# Patient Record
Sex: FEMALE | Race: WHITE | NOT HISPANIC OR LATINO | Employment: OTHER | URBAN - NONMETROPOLITAN AREA
[De-identification: names, ages, dates, MRNs, and addresses within clinical notes are randomized per-mention and may not be internally consistent; named-entity substitution may affect disease eponyms.]

---

## 2018-10-16 ENCOUNTER — APPOINTMENT (OUTPATIENT)
Dept: CT IMAGING | Facility: HOSPITAL | Age: 38
End: 2018-10-16

## 2018-10-16 ENCOUNTER — HOSPITAL ENCOUNTER (EMERGENCY)
Facility: HOSPITAL | Age: 38
Discharge: HOME | End: 2018-10-17
Attending: EMERGENCY MEDICINE | Admitting: NURSE PRACTITIONER

## 2018-10-16 DIAGNOSIS — L98.9 SKIN LESIONS: Primary | ICD-10-CM

## 2018-10-16 LAB
6-ACETYL MORPHINE: NEGATIVE
ALBUMIN SERPL-MCNC: 3.7 G/DL (ref 3.5–5)
ALBUMIN/GLOB SERPL: 0.7 G/DL (ref 1.5–2.5)
ALP SERPL-CCNC: 134 U/L (ref 35–104)
ALT SERPL W P-5'-P-CCNC: 21 U/L (ref 10–36)
AMPHET+METHAMPHET UR QL: NEGATIVE
AMYLASE SERPL-CCNC: 31 U/L (ref 28–100)
ANION GAP SERPL CALCULATED.3IONS-SCNC: 10.1 MMOL/L (ref 3.6–11.2)
AST SERPL-CCNC: 18 U/L (ref 10–30)
BACTERIA UR QL AUTO: ABNORMAL /HPF
BARBITURATES UR QL SCN: NEGATIVE
BASOPHILS # BLD AUTO: 0.06 10*3/MM3 (ref 0–0.3)
BASOPHILS NFR BLD AUTO: 0.6 % (ref 0–2)
BENZODIAZ UR QL SCN: NEGATIVE
BILIRUB SERPL-MCNC: 0.4 MG/DL (ref 0.2–1.8)
BILIRUB UR QL STRIP: NEGATIVE
BUN BLD-MCNC: 13 MG/DL (ref 7–21)
BUN/CREAT SERPL: 13.4 (ref 7–25)
BUPRENORPHINE SERPL-MCNC: NEGATIVE NG/ML
CALCIUM SPEC-SCNC: 9.5 MG/DL (ref 7.7–10)
CANNABINOIDS SERPL QL: NEGATIVE
CHLORIDE SERPL-SCNC: 88 MMOL/L (ref 99–112)
CLARITY UR: CLEAR
CO2 SERPL-SCNC: 26.9 MMOL/L (ref 24.3–31.9)
COCAINE UR QL: NEGATIVE
COLOR UR: YELLOW
CREAT BLD-MCNC: 0.97 MG/DL (ref 0.43–1.29)
CRP SERPL-MCNC: 3.65 MG/DL (ref 0–0.99)
D-LACTATE SERPL-SCNC: 1.8 MMOL/L (ref 0.5–2)
DEPRECATED RDW RBC AUTO: 36 FL (ref 37–54)
EOSINOPHIL # BLD AUTO: 0.23 10*3/MM3 (ref 0–0.7)
EOSINOPHIL NFR BLD AUTO: 2.1 % (ref 0–5)
ERYTHROCYTE [DISTWIDTH] IN BLOOD BY AUTOMATED COUNT: 12.8 % (ref 11.5–14.5)
ERYTHROCYTE [SEDIMENTATION RATE] IN BLOOD: >100 MM/HR (ref 0–20)
ETHANOL BLD-MCNC: <10 MG/DL
ETHANOL UR QL: <0.01 %
GFR SERPL CREATININE-BSD FRML MDRD: 64 ML/MIN/1.73
GLOBULIN UR ELPH-MCNC: 5 GM/DL
GLUCOSE BLD-MCNC: 540 MG/DL (ref 70–110)
GLUCOSE BLDC GLUCOMTR-MCNC: 375 MG/DL (ref 70–130)
GLUCOSE UR STRIP-MCNC: ABNORMAL MG/DL
HCG SERPL QL: NEGATIVE
HCT VFR BLD AUTO: 33.8 % (ref 37–47)
HGB BLD-MCNC: 11.8 G/DL (ref 12–16)
HGB UR QL STRIP.AUTO: ABNORMAL
HYALINE CASTS UR QL AUTO: ABNORMAL /LPF
IMM GRANULOCYTES # BLD: 0.04 10*3/MM3 (ref 0–0.03)
IMM GRANULOCYTES NFR BLD: 0.4 % (ref 0–0.5)
KETONES UR QL STRIP: NEGATIVE
LEUKOCYTE ESTERASE UR QL STRIP.AUTO: ABNORMAL
LIPASE SERPL-CCNC: 80 U/L (ref 13–60)
LYMPHOCYTES # BLD AUTO: 3.05 10*3/MM3 (ref 1–3)
LYMPHOCYTES NFR BLD AUTO: 28.1 % (ref 21–51)
MCH RBC QN AUTO: 27.6 PG (ref 27–33)
MCHC RBC AUTO-ENTMCNC: 34.9 G/DL (ref 33–37)
MCV RBC AUTO: 79 FL (ref 80–94)
METHADONE UR QL SCN: NEGATIVE
MONOCYTES # BLD AUTO: 0.78 10*3/MM3 (ref 0.1–0.9)
MONOCYTES NFR BLD AUTO: 7.2 % (ref 0–10)
NEUTROPHILS # BLD AUTO: 6.71 10*3/MM3 (ref 1.4–6.5)
NEUTROPHILS NFR BLD AUTO: 61.6 % (ref 30–70)
NITRITE UR QL STRIP: NEGATIVE
OPIATES UR QL: NEGATIVE
OSMOLALITY SERPL CALC.SUM OF ELEC: 276.1 MOSM/KG (ref 273–305)
OXYCODONE UR QL SCN: NEGATIVE
PCP UR QL SCN: NEGATIVE
PH UR STRIP.AUTO: 7 [PH] (ref 5–8)
PLATELET # BLD AUTO: 335 10*3/MM3 (ref 130–400)
PMV BLD AUTO: 9.8 FL (ref 6–10)
POTASSIUM BLD-SCNC: 3.6 MMOL/L (ref 3.5–5.3)
PROT SERPL-MCNC: 8.7 G/DL (ref 6–8)
PROT UR QL STRIP: ABNORMAL
RBC # BLD AUTO: 4.28 10*6/MM3 (ref 4.2–5.4)
RBC # UR: ABNORMAL /HPF
REF LAB TEST METHOD: ABNORMAL
SODIUM BLD-SCNC: 125 MMOL/L (ref 135–153)
SP GR UR STRIP: >=1.03 (ref 1–1.03)
SQUAMOUS #/AREA URNS HPF: ABNORMAL /HPF
UROBILINOGEN UR QL STRIP: ABNORMAL
WBC NRBC COR # BLD: 10.87 10*3/MM3 (ref 4.5–12.5)
WBC UR QL AUTO: ABNORMAL /HPF

## 2018-10-16 PROCEDURE — 63710000001 INSULIN REGULAR HUMAN PER 5 UNITS: Performed by: NURSE PRACTITIONER

## 2018-10-16 PROCEDURE — 80307 DRUG TEST PRSMV CHEM ANLYZR: CPT | Performed by: NURSE PRACTITIONER

## 2018-10-16 PROCEDURE — 85025 COMPLETE CBC W/AUTO DIFF WBC: CPT | Performed by: NURSE PRACTITIONER

## 2018-10-16 PROCEDURE — 74177 CT ABD & PELVIS W/CONTRAST: CPT

## 2018-10-16 PROCEDURE — 74177 CT ABD & PELVIS W/CONTRAST: CPT | Performed by: RADIOLOGY

## 2018-10-16 PROCEDURE — 81001 URINALYSIS AUTO W/SCOPE: CPT | Performed by: NURSE PRACTITIONER

## 2018-10-16 PROCEDURE — 86140 C-REACTIVE PROTEIN: CPT | Performed by: NURSE PRACTITIONER

## 2018-10-16 PROCEDURE — 25010000002 IOPAMIDOL 61 % SOLUTION: Performed by: EMERGENCY MEDICINE

## 2018-10-16 PROCEDURE — 85652 RBC SED RATE AUTOMATED: CPT | Performed by: NURSE PRACTITIONER

## 2018-10-16 PROCEDURE — 84703 CHORIONIC GONADOTROPIN ASSAY: CPT | Performed by: NURSE PRACTITIONER

## 2018-10-16 PROCEDURE — 83690 ASSAY OF LIPASE: CPT | Performed by: NURSE PRACTITIONER

## 2018-10-16 PROCEDURE — 96361 HYDRATE IV INFUSION ADD-ON: CPT

## 2018-10-16 PROCEDURE — 80053 COMPREHEN METABOLIC PANEL: CPT | Performed by: NURSE PRACTITIONER

## 2018-10-16 PROCEDURE — 82962 GLUCOSE BLOOD TEST: CPT

## 2018-10-16 PROCEDURE — 87040 BLOOD CULTURE FOR BACTERIA: CPT | Performed by: NURSE PRACTITIONER

## 2018-10-16 PROCEDURE — 82150 ASSAY OF AMYLASE: CPT | Performed by: NURSE PRACTITIONER

## 2018-10-16 PROCEDURE — 99284 EMERGENCY DEPT VISIT MOD MDM: CPT

## 2018-10-16 PROCEDURE — 83605 ASSAY OF LACTIC ACID: CPT | Performed by: NURSE PRACTITIONER

## 2018-10-16 PROCEDURE — 36415 COLL VENOUS BLD VENIPUNCTURE: CPT

## 2018-10-16 RX ORDER — TRAMADOL HYDROCHLORIDE 50 MG/1
50 TABLET ORAL ONCE
Status: COMPLETED | OUTPATIENT
Start: 2018-10-16 | End: 2018-10-16

## 2018-10-16 RX ORDER — SODIUM CHLORIDE 0.9 % (FLUSH) 0.9 %
10 SYRINGE (ML) INJECTION AS NEEDED
Status: DISCONTINUED | OUTPATIENT
Start: 2018-10-16 | End: 2018-10-17 | Stop reason: HOSPADM

## 2018-10-16 RX ADMIN — HUMAN INSULIN 10 UNITS: 100 INJECTION, SOLUTION SUBCUTANEOUS at 21:59

## 2018-10-16 RX ADMIN — IOPAMIDOL 100 ML: 612 INJECTION, SOLUTION INTRAVENOUS at 21:48

## 2018-10-16 RX ADMIN — SODIUM CHLORIDE 1000 ML: 9 INJECTION, SOLUTION INTRAVENOUS at 21:59

## 2018-10-16 RX ADMIN — TRAMADOL HYDROCHLORIDE 50 MG: 50 TABLET, COATED ORAL at 22:51

## 2018-10-17 VITALS
RESPIRATION RATE: 20 BRPM | DIASTOLIC BLOOD PRESSURE: 103 MMHG | SYSTOLIC BLOOD PRESSURE: 143 MMHG | HEIGHT: 67 IN | TEMPERATURE: 98.9 F | BODY MASS INDEX: 20.4 KG/M2 | HEART RATE: 103 BPM | WEIGHT: 130 LBS | OXYGEN SATURATION: 99 %

## 2018-10-17 PROCEDURE — 25010000002 ONDANSETRON PER 1 MG: Performed by: NURSE PRACTITIONER

## 2018-10-17 PROCEDURE — 96365 THER/PROPH/DIAG IV INF INIT: CPT

## 2018-10-17 PROCEDURE — 25010000002 MORPHINE PER 10 MG: Performed by: NURSE PRACTITIONER

## 2018-10-17 PROCEDURE — 96375 TX/PRO/DX INJ NEW DRUG ADDON: CPT

## 2018-10-17 PROCEDURE — 25010000002 CEFTRIAXONE: Performed by: NURSE PRACTITIONER

## 2018-10-17 RX ORDER — TRAMADOL HYDROCHLORIDE 50 MG/1
50 TABLET ORAL EVERY 4 HOURS PRN
Qty: 12 TABLET | Refills: 0 | Status: ON HOLD | OUTPATIENT
Start: 2018-10-17 | End: 2020-05-25

## 2018-10-17 RX ORDER — SULFAMETHOXAZOLE AND TRIMETHOPRIM 800; 160 MG/1; MG/1
2 TABLET ORAL 2 TIMES DAILY
Qty: 40 TABLET | Refills: 0 | Status: SHIPPED | OUTPATIENT
Start: 2018-10-17 | End: 2018-10-27

## 2018-10-17 RX ORDER — CEPHALEXIN 500 MG/1
500 CAPSULE ORAL 4 TIMES DAILY
Qty: 40 CAPSULE | Refills: 0 | Status: SHIPPED | OUTPATIENT
Start: 2018-10-17 | End: 2018-10-27

## 2018-10-17 RX ORDER — ONDANSETRON 2 MG/ML
4 INJECTION INTRAMUSCULAR; INTRAVENOUS ONCE
Status: COMPLETED | OUTPATIENT
Start: 2018-10-17 | End: 2018-10-17

## 2018-10-17 RX ORDER — MORPHINE SULFATE 2 MG/ML
2 INJECTION, SOLUTION INTRAMUSCULAR; INTRAVENOUS ONCE
Status: COMPLETED | OUTPATIENT
Start: 2018-10-17 | End: 2018-10-17

## 2018-10-17 RX ADMIN — CEFTRIAXONE 1 G: 1 INJECTION, POWDER, FOR SOLUTION INTRAMUSCULAR; INTRAVENOUS at 00:35

## 2018-10-17 RX ADMIN — MORPHINE SULFATE 2 MG: 2 INJECTION, SOLUTION INTRAMUSCULAR; INTRAVENOUS at 00:34

## 2018-10-17 RX ADMIN — ONDANSETRON 4 MG: 2 INJECTION, SOLUTION INTRAMUSCULAR; INTRAVENOUS at 00:32

## 2018-10-17 NOTE — ED NOTES
Consent for medical records sent to [facility]       Jossy Agosto, RN  10/16/18 4025

## 2018-10-17 NOTE — ED NOTES
Pt requesting food and pain medicine.  Provider aware.  Advised nothing to eat at this time r/t elevated glucose       Jossy Agosto, RN  10/16/18 9561

## 2018-10-18 NOTE — ED PROVIDER NOTES
Subjective     History provided by:  Patient  Abdominal Pain   Pain location:  Generalized  Pain quality: sharp and shooting    Pain radiates to:  Does not radiate  Pain severity:  Moderate  Onset quality:  Gradual  Timing:  Constant  Progression:  Worsening  Chronicity:  New  Relieved by:  None tried  Worsened by:  Nothing  Ineffective treatments:  None tried  Associated symptoms: no chest pain, no dysuria and no fever    Rash   Location:  Full body  Quality comment:  Multiple areas of open wounds.   Severity:  Moderate  Onset quality:  Gradual  Progression:  Worsening  Chronicity:  Recurrent  Relieved by:  None tried  Worsened by:  Nothing  Ineffective treatments:  None tried  Associated symptoms: abdominal pain    Associated symptoms: no fever        Review of Systems   Constitutional: Negative.  Negative for fever.   HENT: Negative.    Respiratory: Negative.    Cardiovascular: Negative.  Negative for chest pain.   Gastrointestinal: Positive for abdominal pain.   Endocrine: Negative.    Genitourinary: Negative.  Negative for dysuria.   Skin: Positive for rash.   Neurological: Negative.    Psychiatric/Behavioral: Negative.    All other systems reviewed and are negative.      Past Medical History:   Diagnosis Date   • Anxiety    • CAD (coronary artery disease)    • CHF (congestive heart failure) (CMS/HCC)    • Chronic pain    • Cirrhosis (CMS/HCC)    • Depression    • Diabetes mellitus (CMS/HCC)    • Hepatitis B    • Hepatitis C    • Hypertension        Allergies   Allergen Reactions   • Aspirin Hives   • Ibuprofen Hives       Past Surgical History:   Procedure Laterality Date   • ABOVE KNEE AMPUTATION     • CHOLECYSTECTOMY     • HERNIA REPAIR     • HYSTERECTOMY         History reviewed. No pertinent family history.    Social History     Social History   • Marital status:      Social History Main Topics   • Smoking status: Never Smoker   • Smokeless tobacco: Never Used   • Alcohol use No   • Drug use: Yes       Comment: former user < 1 yr   • Sexual activity: Defer     Other Topics Concern   • Not on file           Objective   Physical Exam   Constitutional: She is oriented to person, place, and time. She appears well-developed and well-nourished. No distress.   HENT:   Head: Normocephalic and atraumatic.   Right Ear: External ear normal.   Left Ear: External ear normal.   Nose: Nose normal.   Eyes: Pupils are equal, round, and reactive to light. Conjunctivae and EOM are normal.   Neck: Normal range of motion. Neck supple. No JVD present. No tracheal deviation present.   Cardiovascular: Normal rate, regular rhythm and normal heart sounds.    No murmur heard.  Pulmonary/Chest: Effort normal and breath sounds normal. No respiratory distress. She has no wheezes.   Abdominal: Soft. Bowel sounds are normal. There is tenderness.   Musculoskeletal: Normal range of motion. She exhibits no edema or deformity.   Neurological: She is alert and oriented to person, place, and time. No cranial nerve deficit.   Skin: Skin is warm and dry. No rash noted. She is not diaphoretic. No erythema. No pallor.   Multiple areas of open wounds scattered throughout her body.    Psychiatric: She has a normal mood and affect. Her behavior is normal. Thought content normal.   Nursing note and vitals reviewed.      Procedures           ED Course                  MDM  Number of Diagnoses or Management Options  Skin lesions: established and worsening     Amount and/or Complexity of Data Reviewed  Clinical lab tests: reviewed  Tests in the radiology section of CPT®: reviewed    Risk of Complications, Morbidity, and/or Mortality  Presenting problems: low  Diagnostic procedures: low  Management options: low    Patient Progress  Patient progress: stable        Final diagnoses:   Skin lesions            Gail Boone, APRN  10/18/18 0046

## 2018-10-21 LAB
BACTERIA SPEC AEROBE CULT: NORMAL
BACTERIA SPEC AEROBE CULT: NORMAL

## 2020-05-25 ENCOUNTER — HOSPITAL ENCOUNTER (INPATIENT)
Facility: HOSPITAL | Age: 40
LOS: 21 days | Discharge: HOME | End: 2020-06-15
Attending: FAMILY MEDICINE | Admitting: INTERNAL MEDICINE

## 2020-05-25 ENCOUNTER — APPOINTMENT (OUTPATIENT)
Dept: NUCLEAR MEDICINE | Facility: HOSPITAL | Age: 40
End: 2020-05-25

## 2020-05-25 ENCOUNTER — APPOINTMENT (OUTPATIENT)
Dept: CT IMAGING | Facility: HOSPITAL | Age: 40
End: 2020-05-25

## 2020-05-25 ENCOUNTER — APPOINTMENT (OUTPATIENT)
Dept: GENERAL RADIOLOGY | Facility: HOSPITAL | Age: 40
End: 2020-05-25

## 2020-05-25 DIAGNOSIS — Z79.4 TYPE 2 DIABETES MELLITUS WITH HYPERGLYCEMIA, WITH LONG-TERM CURRENT USE OF INSULIN (HCC): Primary | ICD-10-CM

## 2020-05-25 DIAGNOSIS — E11.65 TYPE 2 DIABETES MELLITUS WITH HYPERGLYCEMIA, WITH LONG-TERM CURRENT USE OF INSULIN (HCC): Primary | ICD-10-CM

## 2020-05-25 DIAGNOSIS — E87.5 HYPERKALEMIA: ICD-10-CM

## 2020-05-25 DIAGNOSIS — I10 HYPERTENSION, UNSPECIFIED TYPE: ICD-10-CM

## 2020-05-25 DIAGNOSIS — B18.2 CHRONIC HEPATITIS C WITHOUT HEPATIC COMA (HCC): ICD-10-CM

## 2020-05-25 DIAGNOSIS — E87.1 HYPONATREMIA: ICD-10-CM

## 2020-05-25 DIAGNOSIS — F15.10 METHAMPHETAMINE ABUSE (HCC): ICD-10-CM

## 2020-05-25 DIAGNOSIS — N17.9 ACUTE RENAL FAILURE, UNSPECIFIED ACUTE RENAL FAILURE TYPE (HCC): ICD-10-CM

## 2020-05-25 DIAGNOSIS — M71.022 OLECRANON BURSA ABSCESS, LEFT: ICD-10-CM

## 2020-05-25 DIAGNOSIS — R31.21 ASYMPTOMATIC MICROSCOPIC HEMATURIA: ICD-10-CM

## 2020-05-25 LAB
6-ACETYL MORPHINE: NEGATIVE
6-ACETYL MORPHINE: NEGATIVE
A-A DO2: 30.7 MMHG (ref 0–300)
ACETONE BLD QL: NEGATIVE
ALBUMIN SERPL-MCNC: 2.94 G/DL (ref 3.5–5.2)
ALBUMIN/GLOB SERPL: 0.5 G/DL
ALP SERPL-CCNC: 190 U/L (ref 39–117)
ALT SERPL W P-5'-P-CCNC: 23 U/L (ref 1–33)
AMPHET+METHAMPHET UR QL: POSITIVE
AMPHET+METHAMPHET UR QL: POSITIVE
AMYLASE SERPL-CCNC: 46 U/L (ref 28–100)
ANION GAP SERPL CALCULATED.3IONS-SCNC: 13.1 MMOL/L (ref 5–15)
ARTERIAL PATENCY WRIST A: ABNORMAL
AST SERPL-CCNC: 26 U/L (ref 1–32)
ATMOSPHERIC PRESS: 727 MMHG
BACTERIA UR QL AUTO: ABNORMAL /HPF
BARBITURATES UR QL SCN: NEGATIVE
BARBITURATES UR QL SCN: NEGATIVE
BASE EXCESS BLDA CALC-SCNC: -2.9 MMOL/L (ref 0–2)
BASOPHILS # BLD AUTO: 0.08 10*3/MM3 (ref 0–0.2)
BASOPHILS NFR BLD AUTO: 1.3 % (ref 0–1.5)
BDY SITE: ABNORMAL
BENZODIAZ UR QL SCN: NEGATIVE
BENZODIAZ UR QL SCN: NEGATIVE
BILIRUB SERPL-MCNC: 0.3 MG/DL (ref 0.2–1.2)
BILIRUB UR QL STRIP: NEGATIVE
BODY TEMPERATURE: 0 C
BUN BLD-MCNC: 25 MG/DL (ref 6–20)
BUN/CREAT SERPL: 18.1 (ref 7–25)
BUPRENORPHINE SERPL-MCNC: POSITIVE NG/ML
BUPRENORPHINE SERPL-MCNC: POSITIVE NG/ML
CALCIUM SPEC-SCNC: 8.7 MG/DL (ref 8.6–10.5)
CANNABINOIDS SERPL QL: NEGATIVE
CANNABINOIDS SERPL QL: NEGATIVE
CHLORIDE SERPL-SCNC: 91 MMOL/L (ref 98–107)
CLARITY UR: ABNORMAL
CO2 BLDA-SCNC: 24.4 MMOL/L (ref 22–33)
CO2 SERPL-SCNC: 22.9 MMOL/L (ref 22–29)
COCAINE UR QL: NEGATIVE
COCAINE UR QL: NEGATIVE
COHGB MFR BLD: 1.3 % (ref 0–5)
COLOR UR: YELLOW
CREAT BLD-MCNC: 1.38 MG/DL (ref 0.57–1)
CRP SERPL-MCNC: 0.28 MG/DL (ref 0–0.5)
D-LACTATE SERPL-SCNC: 1.8 MMOL/L (ref 0.5–2)
DEPRECATED RDW RBC AUTO: 52.2 FL (ref 37–54)
EOSINOPHIL # BLD AUTO: 0.26 10*3/MM3 (ref 0–0.4)
EOSINOPHIL NFR BLD AUTO: 4.1 % (ref 0.3–6.2)
ERYTHROCYTE [DISTWIDTH] IN BLOOD BY AUTOMATED COUNT: 18.2 % (ref 12.3–15.4)
GFR SERPL CREATININE-BSD FRML MDRD: 42 ML/MIN/1.73
GLOBULIN UR ELPH-MCNC: 6.4 GM/DL
GLUCOSE BLD-MCNC: 477 MG/DL (ref 65–99)
GLUCOSE BLDC GLUCOMTR-MCNC: 115 MG/DL (ref 70–130)
GLUCOSE BLDC GLUCOMTR-MCNC: 169 MG/DL (ref 70–130)
GLUCOSE BLDC GLUCOMTR-MCNC: 226 MG/DL (ref 70–130)
GLUCOSE BLDC GLUCOMTR-MCNC: 320 MG/DL (ref 70–130)
GLUCOSE BLDC GLUCOMTR-MCNC: 77 MG/DL (ref 70–130)
GLUCOSE UR STRIP-MCNC: ABNORMAL MG/DL
HAV IGM SERPL QL IA: ABNORMAL
HBV CORE IGM SERPL QL IA: ABNORMAL
HBV SURFACE AG SERPL QL IA: ABNORMAL
HCO3 BLDA-SCNC: 23.1 MMOL/L (ref 20–26)
HCT VFR BLD AUTO: 28.4 % (ref 34–46.6)
HCT VFR BLD CALC: 26 % (ref 38–51)
HCV AB SER DONR QL: REACTIVE
HGB BLD-MCNC: 8.5 G/DL (ref 12–15.9)
HGB BLDA-MCNC: 8.5 G/DL (ref 13.5–17.5)
HGB UR QL STRIP.AUTO: ABNORMAL
HOLD SPECIMEN: NORMAL
HOROWITZ INDEX BLD+IHG-RTO: 21 %
HYALINE CASTS UR QL AUTO: ABNORMAL /LPF
IMM GRANULOCYTES # BLD AUTO: 0.02 10*3/MM3 (ref 0–0.05)
IMM GRANULOCYTES NFR BLD AUTO: 0.3 % (ref 0–0.5)
KETONES UR QL STRIP: NEGATIVE
LEUKOCYTE ESTERASE UR QL STRIP.AUTO: ABNORMAL
LIPASE SERPL-CCNC: 65 U/L (ref 13–60)
LYMPHOCYTES # BLD AUTO: 2.28 10*3/MM3 (ref 0.7–3.1)
LYMPHOCYTES NFR BLD AUTO: 36 % (ref 19.6–45.3)
Lab: ABNORMAL
MCH RBC QN AUTO: 23.8 PG (ref 26.6–33)
MCHC RBC AUTO-ENTMCNC: 29.9 G/DL (ref 31.5–35.7)
MCV RBC AUTO: 79.6 FL (ref 79–97)
METHADONE UR QL SCN: NEGATIVE
METHADONE UR QL SCN: NEGATIVE
METHGB BLD QL: 0.6 % (ref 0–3)
MODALITY: ABNORMAL
MONOCYTES # BLD AUTO: 0.34 10*3/MM3 (ref 0.1–0.9)
MONOCYTES NFR BLD AUTO: 5.4 % (ref 5–12)
NEUTROPHILS # BLD AUTO: 3.35 10*3/MM3 (ref 1.7–7)
NEUTROPHILS NFR BLD AUTO: 52.9 % (ref 42.7–76)
NITRITE UR QL STRIP: NEGATIVE
NOTE: ABNORMAL
NOTIFIED BY: ABNORMAL
NRBC BLD AUTO-RTO: 0 /100 WBC (ref 0–0.2)
OPIATES UR QL: NEGATIVE
OPIATES UR QL: POSITIVE
OXYCODONE UR QL SCN: NEGATIVE
OXYCODONE UR QL SCN: NEGATIVE
OXYHGB MFR BLDV: 89.9 % (ref 94–99)
PCO2 BLDA: 44.8 MM HG (ref 35–45)
PCO2 TEMP ADJ BLD: ABNORMAL MM[HG]
PCP UR QL SCN: NEGATIVE
PCP UR QL SCN: NEGATIVE
PH BLDA: 7.32 PH UNITS (ref 7.35–7.45)
PH UR STRIP.AUTO: 7 [PH] (ref 5–8)
PH, TEMP CORRECTED: ABNORMAL
PLATELET # BLD AUTO: 338 10*3/MM3 (ref 140–450)
PMV BLD AUTO: 9.8 FL (ref 6–12)
PO2 BLDA: 61.5 MM HG (ref 83–108)
PO2 TEMP ADJ BLD: ABNORMAL MM[HG]
POTASSIUM BLD-SCNC: 4.3 MMOL/L (ref 3.5–5.2)
PROT SERPL-MCNC: 9.3 G/DL (ref 6–8.5)
PROT UR QL STRIP: ABNORMAL
RBC # BLD AUTO: 3.57 10*6/MM3 (ref 3.77–5.28)
RBC # UR: ABNORMAL /HPF
REF LAB TEST METHOD: ABNORMAL
SAO2 % BLDCOA: 91.6 % (ref 94–99)
SODIUM BLD-SCNC: 127 MMOL/L (ref 136–145)
SP GR UR STRIP: 1.02 (ref 1–1.03)
SQUAMOUS #/AREA URNS HPF: ABNORMAL /HPF
TROPONIN T SERPL-MCNC: 0.02 NG/ML (ref 0–0.03)
TROPONIN T SERPL-MCNC: 0.03 NG/ML (ref 0–0.03)
TROPONIN T SERPL-MCNC: 0.03 NG/ML (ref 0–0.03)
TROPONIN T SERPL-MCNC: 0.05 NG/ML (ref 0–0.03)
UROBILINOGEN UR QL STRIP: ABNORMAL
VENTILATOR MODE: ABNORMAL
WBC NRBC COR # BLD: 6.33 10*3/MM3 (ref 3.4–10.8)
WBC UR QL AUTO: ABNORMAL /HPF

## 2020-05-25 PROCEDURE — 84484 ASSAY OF TROPONIN QUANT: CPT | Performed by: INTERNAL MEDICINE

## 2020-05-25 PROCEDURE — 78264 GASTRIC EMPTYING IMG STUDY: CPT

## 2020-05-25 PROCEDURE — 83050 HGB METHEMOGLOBIN QUAN: CPT

## 2020-05-25 PROCEDURE — 25010000002 CEFTRIAXONE PER 250 MG: Performed by: PHYSICIAN ASSISTANT

## 2020-05-25 PROCEDURE — 93010 ELECTROCARDIOGRAM REPORT: CPT | Performed by: INTERNAL MEDICINE

## 2020-05-25 PROCEDURE — 82805 BLOOD GASES W/O2 SATURATION: CPT

## 2020-05-25 PROCEDURE — 80307 DRUG TEST PRSMV CHEM ANLYZR: CPT | Performed by: PHYSICIAN ASSISTANT

## 2020-05-25 PROCEDURE — 80074 ACUTE HEPATITIS PANEL: CPT | Performed by: PHYSICIAN ASSISTANT

## 2020-05-25 PROCEDURE — 83690 ASSAY OF LIPASE: CPT | Performed by: PHYSICIAN ASSISTANT

## 2020-05-25 PROCEDURE — 80307 DRUG TEST PRSMV CHEM ANLYZR: CPT | Performed by: INTERNAL MEDICINE

## 2020-05-25 PROCEDURE — 82150 ASSAY OF AMYLASE: CPT | Performed by: PHYSICIAN ASSISTANT

## 2020-05-25 PROCEDURE — 83605 ASSAY OF LACTIC ACID: CPT | Performed by: INTERNAL MEDICINE

## 2020-05-25 PROCEDURE — 63710000001 INSULIN ASPART PER 5 UNITS: Performed by: INTERNAL MEDICINE

## 2020-05-25 PROCEDURE — 93005 ELECTROCARDIOGRAM TRACING: CPT | Performed by: PHYSICIAN ASSISTANT

## 2020-05-25 PROCEDURE — 87040 BLOOD CULTURE FOR BACTERIA: CPT | Performed by: PHYSICIAN ASSISTANT

## 2020-05-25 PROCEDURE — 74176 CT ABD & PELVIS W/O CONTRAST: CPT

## 2020-05-25 PROCEDURE — 84484 ASSAY OF TROPONIN QUANT: CPT | Performed by: PHYSICIAN ASSISTANT

## 2020-05-25 PROCEDURE — 71045 X-RAY EXAM CHEST 1 VIEW: CPT

## 2020-05-25 PROCEDURE — 99223 1ST HOSP IP/OBS HIGH 75: CPT | Performed by: INTERNAL MEDICINE

## 2020-05-25 PROCEDURE — 99284 EMERGENCY DEPT VISIT MOD MDM: CPT

## 2020-05-25 PROCEDURE — 82375 ASSAY CARBOXYHB QUANT: CPT

## 2020-05-25 PROCEDURE — 0 TECHNETIUM SULFUR COLLOID: Performed by: INTERNAL MEDICINE

## 2020-05-25 PROCEDURE — 63710000001 INSULIN DETEMIR PER 5 UNITS: Performed by: INTERNAL MEDICINE

## 2020-05-25 PROCEDURE — 80053 COMPREHEN METABOLIC PANEL: CPT | Performed by: PHYSICIAN ASSISTANT

## 2020-05-25 PROCEDURE — A9541 TC99M SULFUR COLLOID: HCPCS | Performed by: INTERNAL MEDICINE

## 2020-05-25 PROCEDURE — 94770: CPT

## 2020-05-25 PROCEDURE — 81001 URINALYSIS AUTO W/SCOPE: CPT | Performed by: PHYSICIAN ASSISTANT

## 2020-05-25 PROCEDURE — 82962 GLUCOSE BLOOD TEST: CPT

## 2020-05-25 PROCEDURE — 25010000002 MORPHINE PER 10 MG: Performed by: FAMILY MEDICINE

## 2020-05-25 PROCEDURE — 36600 WITHDRAWAL OF ARTERIAL BLOOD: CPT

## 2020-05-25 PROCEDURE — 63710000001 INSULIN REGULAR HUMAN PER 5 UNITS: Performed by: PHYSICIAN ASSISTANT

## 2020-05-25 PROCEDURE — 86140 C-REACTIVE PROTEIN: CPT | Performed by: INTERNAL MEDICINE

## 2020-05-25 PROCEDURE — 82009 KETONE BODYS QUAL: CPT | Performed by: INTERNAL MEDICINE

## 2020-05-25 PROCEDURE — 85025 COMPLETE CBC W/AUTO DIFF WBC: CPT | Performed by: PHYSICIAN ASSISTANT

## 2020-05-25 PROCEDURE — 87086 URINE CULTURE/COLONY COUNT: CPT | Performed by: INTERNAL MEDICINE

## 2020-05-25 PROCEDURE — 25010000002 ENOXAPARIN PER 10 MG: Performed by: INTERNAL MEDICINE

## 2020-05-25 RX ORDER — SODIUM CHLORIDE 0.9 % (FLUSH) 0.9 %
10 SYRINGE (ML) INJECTION AS NEEDED
Status: DISCONTINUED | OUTPATIENT
Start: 2020-05-25 | End: 2020-06-15 | Stop reason: HOSPADM

## 2020-05-25 RX ORDER — BUPRENORPHINE HYDROCHLORIDE AND NALOXONE HYDROCHLORIDE DIHYDRATE 8; 2 MG/1; MG/1
1.25 TABLET SUBLINGUAL DAILY
COMMUNITY

## 2020-05-25 RX ORDER — DEXTROSE MONOHYDRATE 25 G/50ML
25 INJECTION, SOLUTION INTRAVENOUS
Status: DISCONTINUED | OUTPATIENT
Start: 2020-05-25 | End: 2020-06-05 | Stop reason: SDUPTHER

## 2020-05-25 RX ORDER — NICOTINE POLACRILEX 4 MG
15 LOZENGE BUCCAL
Status: DISCONTINUED | OUTPATIENT
Start: 2020-05-25 | End: 2020-06-05 | Stop reason: SDUPTHER

## 2020-05-25 RX ORDER — LISINOPRIL 20 MG/1
20 TABLET ORAL 2 TIMES DAILY
COMMUNITY
End: 2020-06-15 | Stop reason: HOSPADM

## 2020-05-25 RX ORDER — CARVEDILOL 6.25 MG/1
6.25 TABLET ORAL 2 TIMES DAILY WITH MEALS
Status: DISCONTINUED | OUTPATIENT
Start: 2020-05-25 | End: 2020-06-15 | Stop reason: HOSPADM

## 2020-05-25 RX ORDER — CLONIDINE HYDROCHLORIDE 0.2 MG/1
0.2 TABLET ORAL EVERY 12 HOURS SCHEDULED
Status: DISCONTINUED | OUTPATIENT
Start: 2020-05-25 | End: 2020-06-15 | Stop reason: HOSPADM

## 2020-05-25 RX ORDER — NITROGLYCERIN 0.4 MG/1
0.4 TABLET SUBLINGUAL
Status: DISCONTINUED | OUTPATIENT
Start: 2020-05-25 | End: 2020-06-15 | Stop reason: HOSPADM

## 2020-05-25 RX ORDER — CARVEDILOL 6.25 MG/1
6.25 TABLET ORAL 2 TIMES DAILY WITH MEALS
COMMUNITY

## 2020-05-25 RX ORDER — METOPROLOL TARTRATE 5 MG/5ML
2.5 INJECTION INTRAVENOUS ONCE
Status: COMPLETED | OUTPATIENT
Start: 2020-05-25 | End: 2020-05-25

## 2020-05-25 RX ORDER — LABETALOL HYDROCHLORIDE 5 MG/ML
10 INJECTION, SOLUTION INTRAVENOUS ONCE
Status: COMPLETED | OUTPATIENT
Start: 2020-05-25 | End: 2020-05-25

## 2020-05-25 RX ORDER — ROPINIROLE 0.25 MG/1
0.25 TABLET, FILM COATED ORAL NIGHTLY
Status: DISCONTINUED | OUTPATIENT
Start: 2020-05-25 | End: 2020-06-15 | Stop reason: HOSPADM

## 2020-05-25 RX ORDER — TIZANIDINE 4 MG/1
4 TABLET ORAL EVERY 8 HOURS PRN
COMMUNITY

## 2020-05-25 RX ORDER — SODIUM CHLORIDE 9 MG/ML
75 INJECTION, SOLUTION INTRAVENOUS CONTINUOUS
Status: DISCONTINUED | OUTPATIENT
Start: 2020-05-25 | End: 2020-05-29

## 2020-05-25 RX ORDER — HYDRALAZINE HYDROCHLORIDE 20 MG/ML
10 INJECTION INTRAMUSCULAR; INTRAVENOUS EVERY 6 HOURS PRN
Status: DISCONTINUED | OUTPATIENT
Start: 2020-05-25 | End: 2020-06-15 | Stop reason: HOSPADM

## 2020-05-25 RX ORDER — BUPRENORPHINE HYDROCHLORIDE AND NALOXONE HYDROCHLORIDE DIHYDRATE 8; 2 MG/1; MG/1
1.25 TABLET SUBLINGUAL DAILY
Status: DISCONTINUED | OUTPATIENT
Start: 2020-05-25 | End: 2020-06-15 | Stop reason: HOSPADM

## 2020-05-25 RX ORDER — SODIUM CHLORIDE 0.9 % (FLUSH) 0.9 %
10 SYRINGE (ML) INJECTION EVERY 12 HOURS SCHEDULED
Status: DISCONTINUED | OUTPATIENT
Start: 2020-05-25 | End: 2020-06-15 | Stop reason: HOSPADM

## 2020-05-25 RX ORDER — LISINOPRIL 10 MG/1
20 TABLET ORAL 2 TIMES DAILY
Status: CANCELLED | OUTPATIENT
Start: 2020-05-25

## 2020-05-25 RX ORDER — ROPINIROLE 0.25 MG/1
0.25 TABLET, FILM COATED ORAL NIGHTLY
COMMUNITY

## 2020-05-25 RX ORDER — TIZANIDINE 4 MG/1
4 TABLET ORAL EVERY 8 HOURS PRN
Status: DISCONTINUED | OUTPATIENT
Start: 2020-05-25 | End: 2020-06-15 | Stop reason: HOSPADM

## 2020-05-25 RX ORDER — CLONIDINE HYDROCHLORIDE 0.2 MG/1
0.2 TABLET ORAL 2 TIMES DAILY
COMMUNITY

## 2020-05-25 RX ADMIN — SODIUM CHLORIDE, PRESERVATIVE FREE 10 ML: 5 INJECTION INTRAVENOUS at 20:15

## 2020-05-25 RX ADMIN — SODIUM CHLORIDE 100 ML/HR: 9 INJECTION, SOLUTION INTRAVENOUS at 09:40

## 2020-05-25 RX ADMIN — SODIUM CHLORIDE 1000 ML: 9 INJECTION, SOLUTION INTRAVENOUS at 03:22

## 2020-05-25 RX ADMIN — TECHNETIUM TC 99M SULFUR COLLOID 1 DOSE: KIT at 11:47

## 2020-05-25 RX ADMIN — CARVEDILOL 6.25 MG: 6.25 TABLET, FILM COATED ORAL at 17:36

## 2020-05-25 RX ADMIN — INSULIN ASPART 4 UNITS: 100 INJECTION, SOLUTION INTRAVENOUS; SUBCUTANEOUS at 17:36

## 2020-05-25 RX ADMIN — SODIUM CHLORIDE 100 ML/HR: 9 INJECTION, SOLUTION INTRAVENOUS at 20:28

## 2020-05-25 RX ADMIN — TIZANIDINE 4 MG: 4 TABLET ORAL at 17:36

## 2020-05-25 RX ADMIN — ROPINIROLE HYDROCHLORIDE 0.25 MG: 0.25 TABLET, FILM COATED ORAL at 20:15

## 2020-05-25 RX ADMIN — HUMAN INSULIN 8 UNITS: 100 INJECTION, SOLUTION SUBCUTANEOUS at 04:46

## 2020-05-25 RX ADMIN — CARVEDILOL 6.25 MG: 6.25 TABLET, FILM COATED ORAL at 13:35

## 2020-05-25 RX ADMIN — CLONIDINE HYDROCHLORIDE 0.2 MG: 0.2 TABLET ORAL at 20:15

## 2020-05-25 RX ADMIN — ENOXAPARIN SODIUM 40 MG: 40 INJECTION SUBCUTANEOUS at 08:22

## 2020-05-25 RX ADMIN — INSULIN ASPART 2 UNITS: 100 INJECTION, SOLUTION INTRAVENOUS; SUBCUTANEOUS at 09:40

## 2020-05-25 RX ADMIN — SODIUM CHLORIDE 1000 ML: 9 INJECTION, SOLUTION INTRAVENOUS at 04:28

## 2020-05-25 RX ADMIN — MORPHINE SULFATE 4 MG: 4 INJECTION, SOLUTION INTRAMUSCULAR; INTRAVENOUS at 04:42

## 2020-05-25 RX ADMIN — BUPRENORPHINE HYDROCHLORIDE AND NALOXONE HYDROCHLORIDE 1.25 TABLET: 8; 2 TABLET SUBLINGUAL at 09:44

## 2020-05-25 RX ADMIN — SODIUM CHLORIDE, PRESERVATIVE FREE 10 ML: 5 INJECTION INTRAVENOUS at 08:22

## 2020-05-25 RX ADMIN — METOPROLOL TARTRATE 2.5 MG: 5 INJECTION, SOLUTION INTRAVENOUS at 05:01

## 2020-05-25 RX ADMIN — CLONIDINE HYDROCHLORIDE 0.2 MG: 0.2 TABLET ORAL at 13:34

## 2020-05-25 RX ADMIN — LABETALOL HYDROCHLORIDE 10 MG: 5 INJECTION INTRAVENOUS at 06:23

## 2020-05-25 RX ADMIN — SODIUM CHLORIDE 2 G: 900 INJECTION INTRAVENOUS at 07:09

## 2020-05-25 RX ADMIN — INSULIN DETEMIR 45 UNITS: 100 INJECTION, SOLUTION SUBCUTANEOUS at 20:15

## 2020-05-26 ENCOUNTER — APPOINTMENT (OUTPATIENT)
Dept: GENERAL RADIOLOGY | Facility: HOSPITAL | Age: 40
End: 2020-05-26

## 2020-05-26 ENCOUNTER — APPOINTMENT (OUTPATIENT)
Dept: CARDIOLOGY | Facility: HOSPITAL | Age: 40
End: 2020-05-26

## 2020-05-26 LAB
25(OH)D3 SERPL-MCNC: 7 NG/ML (ref 30–100)
ALBUMIN SERPL-MCNC: 2.52 G/DL (ref 3.5–5.2)
ALBUMIN/GLOB SERPL: 0.5 G/DL
ALP SERPL-CCNC: 162 U/L (ref 39–117)
ALT SERPL W P-5'-P-CCNC: 20 U/L (ref 1–33)
ANION GAP SERPL CALCULATED.3IONS-SCNC: 10.8 MMOL/L (ref 5–15)
ANISOCYTOSIS BLD QL: NORMAL
AST SERPL-CCNC: 28 U/L (ref 1–32)
BACTERIA SPEC AEROBE CULT: NORMAL
BASOPHILS # BLD AUTO: 0.05 10*3/MM3 (ref 0–0.2)
BASOPHILS NFR BLD AUTO: 0.9 % (ref 0–1.5)
BH CV ECHO MEAS - % IVS THICK: 0.18 %
BH CV ECHO MEAS - % LVPW THICK: 36.7 %
BH CV ECHO MEAS - ACS: 2.3 CM
BH CV ECHO MEAS - AO MAX PG: 11 MMHG
BH CV ECHO MEAS - AO MEAN PG: 5 MMHG
BH CV ECHO MEAS - AO ROOT AREA (BSA CORRECTED): 1.8
BH CV ECHO MEAS - AO ROOT AREA: 7.1 CM^2
BH CV ECHO MEAS - AO ROOT DIAM: 3 CM
BH CV ECHO MEAS - AO V2 MAX: 166 CM/SEC
BH CV ECHO MEAS - AO V2 MEAN: 108 CM/SEC
BH CV ECHO MEAS - AO V2 VTI: 34.5 CM
BH CV ECHO MEAS - BSA(HAYCOCK): 1.6 M^2
BH CV ECHO MEAS - BSA: 1.7 M^2
BH CV ECHO MEAS - BZI_BMI: 19.6 KILOGRAMS/M^2
BH CV ECHO MEAS - BZI_METRIC_HEIGHT: 170.2 CM
BH CV ECHO MEAS - BZI_METRIC_WEIGHT: 56.7 KG
BH CV ECHO MEAS - EDV(CUBED): 126.1 ML
BH CV ECHO MEAS - EDV(MOD-SP4): 63.1 ML
BH CV ECHO MEAS - EDV(TEICH): 119.1 ML
BH CV ECHO MEAS - EF(CUBED): 54.9 %
BH CV ECHO MEAS - EF(MOD-BP): 59 %
BH CV ECHO MEAS - EF(MOD-SP4): 58.5 %
BH CV ECHO MEAS - EF(TEICH): 46.5 %
BH CV ECHO MEAS - ESV(CUBED): 56.8 ML
BH CV ECHO MEAS - ESV(MOD-SP4): 26.2 ML
BH CV ECHO MEAS - ESV(TEICH): 63.7 ML
BH CV ECHO MEAS - FS: 23.3 %
BH CV ECHO MEAS - IVS/LVPW: 1.2
BH CV ECHO MEAS - IVSD: 1.1 CM
BH CV ECHO MEAS - IVSS: 1.1 CM
BH CV ECHO MEAS - LA DIMENSION: 2.9 CM
BH CV ECHO MEAS - LA/AO: 0.97
BH CV ECHO MEAS - LV DIASTOLIC VOL/BSA (35-75): 38.1 ML/M^2
BH CV ECHO MEAS - LV MASS(C)D: 197.6 GRAMS
BH CV ECHO MEAS - LV MASS(C)DI: 119.3 GRAMS/M^2
BH CV ECHO MEAS - LV MASS(C)S: 163.8 GRAMS
BH CV ECHO MEAS - LV MASS(C)SI: 98.9 GRAMS/M^2
BH CV ECHO MEAS - LV SYSTOLIC VOL/BSA (12-30): 15.8 ML/M^2
BH CV ECHO MEAS - LVIDD: 5 CM
BH CV ECHO MEAS - LVIDS: 3.8 CM
BH CV ECHO MEAS - LVLD AP4: 7.5 CM
BH CV ECHO MEAS - LVLS AP4: 6.1 CM
BH CV ECHO MEAS - LVOT AREA (M): 3.8 CM^2
BH CV ECHO MEAS - LVOT AREA: 3.8 CM^2
BH CV ECHO MEAS - LVOT DIAM: 2.2 CM
BH CV ECHO MEAS - LVPWD: 0.98 CM
BH CV ECHO MEAS - LVPWS: 1.3 CM
BH CV ECHO MEAS - MV A MAX VEL: 75.3 CM/SEC
BH CV ECHO MEAS - MV E MAX VEL: 89.4 CM/SEC
BH CV ECHO MEAS - MV E/A: 1.2
BH CV ECHO MEAS - PA ACC TIME: 0.09 SEC
BH CV ECHO MEAS - PA PR(ACCEL): 39.4 MMHG
BH CV ECHO MEAS - RAP SYSTOLE: 10 MMHG
BH CV ECHO MEAS - RVSP: 38 MMHG
BH CV ECHO MEAS - SI(AO): 147.3 ML/M^2
BH CV ECHO MEAS - SI(CUBED): 41.8 ML/M^2
BH CV ECHO MEAS - SI(MOD-SP4): 22.3 ML/M^2
BH CV ECHO MEAS - SI(TEICH): 33.4 ML/M^2
BH CV ECHO MEAS - SV(AO): 243.9 ML
BH CV ECHO MEAS - SV(CUBED): 69.3 ML
BH CV ECHO MEAS - SV(MOD-SP4): 36.9 ML
BH CV ECHO MEAS - SV(TEICH): 55.4 ML
BH CV ECHO MEAS - TR MAX VEL: 245.5 CM/SEC
BILIRUB SERPL-MCNC: 0.2 MG/DL (ref 0.2–1.2)
BUN BLD-MCNC: 23 MG/DL (ref 6–20)
BUN/CREAT SERPL: 20.2 (ref 7–25)
CALCIUM SPEC-SCNC: 8.4 MG/DL (ref 8.6–10.5)
CHLORIDE SERPL-SCNC: 98 MMOL/L (ref 98–107)
CO2 SERPL-SCNC: 22.2 MMOL/L (ref 22–29)
CREAT BLD-MCNC: 1.14 MG/DL (ref 0.57–1)
CRP SERPL-MCNC: 0.35 MG/DL (ref 0–0.5)
DEPRECATED RDW RBC AUTO: 56.8 FL (ref 37–54)
EOSINOPHIL # BLD AUTO: 0.36 10*3/MM3 (ref 0–0.4)
EOSINOPHIL NFR BLD AUTO: 6.3 % (ref 0.3–6.2)
ERYTHROCYTE [DISTWIDTH] IN BLOOD BY AUTOMATED COUNT: 19 % (ref 12.3–15.4)
FERRITIN SERPL-MCNC: 47.64 NG/ML (ref 13–150)
FOLATE SERPL-MCNC: 3.62 NG/ML (ref 4.78–24.2)
GFR SERPL CREATININE-BSD FRML MDRD: 53 ML/MIN/1.73
GLOBULIN UR ELPH-MCNC: 5.4 GM/DL
GLUCOSE BLD-MCNC: 292 MG/DL (ref 65–99)
GLUCOSE BLDC GLUCOMTR-MCNC: 241 MG/DL (ref 70–130)
GLUCOSE BLDC GLUCOMTR-MCNC: 81 MG/DL (ref 70–130)
GLUCOSE BLDC GLUCOMTR-MCNC: 98 MG/DL (ref 70–130)
HBA1C MFR BLD: 11.1 % (ref 4.8–5.6)
HCT VFR BLD AUTO: 26.6 % (ref 34–46.6)
HGB BLD-MCNC: 7.6 G/DL (ref 12–15.9)
HYPOCHROMIA BLD QL: NORMAL
IMM GRANULOCYTES # BLD AUTO: 0.01 10*3/MM3 (ref 0–0.05)
IMM GRANULOCYTES NFR BLD AUTO: 0.2 % (ref 0–0.5)
IRON 24H UR-MRATE: 38 MCG/DL (ref 37–145)
IRON SATN MFR SERPL: 12 % (ref 20–50)
LYMPHOCYTES # BLD AUTO: 2.17 10*3/MM3 (ref 0.7–3.1)
LYMPHOCYTES NFR BLD AUTO: 37.7 % (ref 19.6–45.3)
MAGNESIUM SERPL-MCNC: 1.6 MG/DL (ref 1.6–2.6)
MAXIMAL PREDICTED HEART RATE: 179 BPM
MCH RBC QN AUTO: 23.7 PG (ref 26.6–33)
MCHC RBC AUTO-ENTMCNC: 28.6 G/DL (ref 31.5–35.7)
MCV RBC AUTO: 82.9 FL (ref 79–97)
MONOCYTES # BLD AUTO: 0.39 10*3/MM3 (ref 0.1–0.9)
MONOCYTES NFR BLD AUTO: 6.8 % (ref 5–12)
NEUTROPHILS # BLD AUTO: 2.78 10*3/MM3 (ref 1.7–7)
NEUTROPHILS NFR BLD AUTO: 48.1 % (ref 42.7–76)
NRBC BLD AUTO-RTO: 0 /100 WBC (ref 0–0.2)
PHOSPHATE SERPL-MCNC: 3.9 MG/DL (ref 2.5–4.5)
PLAT MORPH BLD: NORMAL
PLATELET # BLD AUTO: 274 10*3/MM3 (ref 140–450)
PMV BLD AUTO: 9.6 FL (ref 6–12)
POTASSIUM BLD-SCNC: 4.8 MMOL/L (ref 3.5–5.2)
PROT SERPL-MCNC: 7.9 G/DL (ref 6–8.5)
RBC # BLD AUTO: 3.21 10*6/MM3 (ref 3.77–5.28)
SODIUM BLD-SCNC: 131 MMOL/L (ref 136–145)
STRESS TARGET HR: 152 BPM
TIBC SERPL-MCNC: 311 MCG/DL (ref 298–536)
TRANSFERRIN SERPL-MCNC: 209 MG/DL (ref 200–360)
TROPONIN T SERPL-MCNC: 0.03 NG/ML (ref 0–0.03)
VIT B12 BLD-MCNC: 667 PG/ML (ref 211–946)
WBC NRBC COR # BLD: 5.76 10*3/MM3 (ref 3.4–10.8)

## 2020-05-26 PROCEDURE — 86140 C-REACTIVE PROTEIN: CPT | Performed by: INTERNAL MEDICINE

## 2020-05-26 PROCEDURE — 82746 ASSAY OF FOLIC ACID SERUM: CPT | Performed by: INTERNAL MEDICINE

## 2020-05-26 PROCEDURE — 82570 ASSAY OF URINE CREATININE: CPT | Performed by: INTERNAL MEDICINE

## 2020-05-26 PROCEDURE — 25010000003 MAGNESIUM SULFATE 4 GM/100ML SOLUTION: Performed by: INTERNAL MEDICINE

## 2020-05-26 PROCEDURE — 83036 HEMOGLOBIN GLYCOSYLATED A1C: CPT | Performed by: INTERNAL MEDICINE

## 2020-05-26 PROCEDURE — 63710000001 INSULIN ASPART PER 5 UNITS: Performed by: INTERNAL MEDICINE

## 2020-05-26 PROCEDURE — 82728 ASSAY OF FERRITIN: CPT | Performed by: INTERNAL MEDICINE

## 2020-05-26 PROCEDURE — 85007 BL SMEAR W/DIFF WBC COUNT: CPT | Performed by: INTERNAL MEDICINE

## 2020-05-26 PROCEDURE — 99233 SBSQ HOSP IP/OBS HIGH 50: CPT | Performed by: INTERNAL MEDICINE

## 2020-05-26 PROCEDURE — C1751 CATH, INF, PER/CENT/MIDLINE: HCPCS

## 2020-05-26 PROCEDURE — 93306 TTE W/DOPPLER COMPLETE: CPT

## 2020-05-26 PROCEDURE — 25010000002 CEFTRIAXONE PER 250 MG: Performed by: INTERNAL MEDICINE

## 2020-05-26 PROCEDURE — 83735 ASSAY OF MAGNESIUM: CPT | Performed by: INTERNAL MEDICINE

## 2020-05-26 PROCEDURE — 82306 VITAMIN D 25 HYDROXY: CPT | Performed by: INTERNAL MEDICINE

## 2020-05-26 PROCEDURE — 63710000001 INSULIN DETEMIR PER 5 UNITS: Performed by: INTERNAL MEDICINE

## 2020-05-26 PROCEDURE — 84100 ASSAY OF PHOSPHORUS: CPT | Performed by: INTERNAL MEDICINE

## 2020-05-26 PROCEDURE — 82962 GLUCOSE BLOOD TEST: CPT

## 2020-05-26 PROCEDURE — 73080 X-RAY EXAM OF ELBOW: CPT

## 2020-05-26 PROCEDURE — 73080 X-RAY EXAM OF ELBOW: CPT | Performed by: RADIOLOGY

## 2020-05-26 PROCEDURE — 93306 TTE W/DOPPLER COMPLETE: CPT | Performed by: SPECIALIST

## 2020-05-26 PROCEDURE — 84484 ASSAY OF TROPONIN QUANT: CPT | Performed by: INTERNAL MEDICINE

## 2020-05-26 PROCEDURE — 85025 COMPLETE CBC W/AUTO DIFF WBC: CPT | Performed by: INTERNAL MEDICINE

## 2020-05-26 PROCEDURE — 84466 ASSAY OF TRANSFERRIN: CPT | Performed by: INTERNAL MEDICINE

## 2020-05-26 PROCEDURE — 82607 VITAMIN B-12: CPT | Performed by: INTERNAL MEDICINE

## 2020-05-26 PROCEDURE — 83540 ASSAY OF IRON: CPT | Performed by: INTERNAL MEDICINE

## 2020-05-26 PROCEDURE — 36410 VNPNXR 3YR/> PHY/QHP DX/THER: CPT

## 2020-05-26 PROCEDURE — 25010000002 ENOXAPARIN PER 10 MG: Performed by: INTERNAL MEDICINE

## 2020-05-26 PROCEDURE — 84156 ASSAY OF PROTEIN URINE: CPT | Performed by: INTERNAL MEDICINE

## 2020-05-26 PROCEDURE — 80053 COMPREHEN METABOLIC PANEL: CPT | Performed by: INTERNAL MEDICINE

## 2020-05-26 RX ORDER — DIAPER,BRIEF,INFANT-TODD,DISP
EACH MISCELLANEOUS EVERY 12 HOURS SCHEDULED
Status: DISCONTINUED | OUTPATIENT
Start: 2020-05-26 | End: 2020-06-15 | Stop reason: HOSPADM

## 2020-05-26 RX ORDER — FOLIC ACID 1 MG/1
1 TABLET ORAL DAILY
Status: DISCONTINUED | OUTPATIENT
Start: 2020-05-26 | End: 2020-06-15 | Stop reason: HOSPADM

## 2020-05-26 RX ORDER — MAGNESIUM SULFATE HEPTAHYDRATE 40 MG/ML
2 INJECTION, SOLUTION INTRAVENOUS AS NEEDED
Status: DISCONTINUED | OUTPATIENT
Start: 2020-05-26 | End: 2020-06-15 | Stop reason: HOSPADM

## 2020-05-26 RX ORDER — MAGNESIUM SULFATE HEPTAHYDRATE 40 MG/ML
4 INJECTION, SOLUTION INTRAVENOUS ONCE
Status: COMPLETED | OUTPATIENT
Start: 2020-05-26 | End: 2020-05-26

## 2020-05-26 RX ORDER — L.ACID,PARA/B.BIFIDUM/S.THERM 8B CELL
1 CAPSULE ORAL DAILY
Status: DISCONTINUED | OUTPATIENT
Start: 2020-05-26 | End: 2020-06-01

## 2020-05-26 RX ORDER — MAGNESIUM SULFATE HEPTAHYDRATE 40 MG/ML
4 INJECTION, SOLUTION INTRAVENOUS AS NEEDED
Status: DISCONTINUED | OUTPATIENT
Start: 2020-05-26 | End: 2020-06-15 | Stop reason: HOSPADM

## 2020-05-26 RX ADMIN — CEFTRIAXONE 2 G: 2 INJECTION, POWDER, FOR SOLUTION INTRAMUSCULAR; INTRAVENOUS at 06:11

## 2020-05-26 RX ADMIN — CLONIDINE HYDROCHLORIDE 0.2 MG: 0.2 TABLET ORAL at 08:02

## 2020-05-26 RX ADMIN — INSULIN DETEMIR 45 UNITS: 100 INJECTION, SOLUTION SUBCUTANEOUS at 20:22

## 2020-05-26 RX ADMIN — ROPINIROLE HYDROCHLORIDE 0.25 MG: 0.25 TABLET, FILM COATED ORAL at 20:14

## 2020-05-26 RX ADMIN — BACITRACIN: 500 OINTMENT TOPICAL at 21:22

## 2020-05-26 RX ADMIN — Medication 1 CAPSULE: at 12:04

## 2020-05-26 RX ADMIN — CLONIDINE HYDROCHLORIDE 0.2 MG: 0.2 TABLET ORAL at 20:14

## 2020-05-26 RX ADMIN — VANCOMYCIN HYDROCHLORIDE 1000 MG: 1 INJECTION, POWDER, LYOPHILIZED, FOR SOLUTION INTRAVENOUS at 11:46

## 2020-05-26 RX ADMIN — ENOXAPARIN SODIUM 40 MG: 40 INJECTION SUBCUTANEOUS at 08:03

## 2020-05-26 RX ADMIN — SODIUM CHLORIDE, PRESERVATIVE FREE 10 ML: 5 INJECTION INTRAVENOUS at 08:04

## 2020-05-26 RX ADMIN — INSULIN DETEMIR 45 UNITS: 100 INJECTION, SOLUTION SUBCUTANEOUS at 08:05

## 2020-05-26 RX ADMIN — SODIUM CHLORIDE, PRESERVATIVE FREE 10 ML: 5 INJECTION INTRAVENOUS at 20:15

## 2020-05-26 RX ADMIN — CARVEDILOL 6.25 MG: 6.25 TABLET, FILM COATED ORAL at 17:21

## 2020-05-26 RX ADMIN — TIZANIDINE 4 MG: 4 TABLET ORAL at 18:29

## 2020-05-26 RX ADMIN — INSULIN ASPART 4 UNITS: 100 INJECTION, SOLUTION INTRAVENOUS; SUBCUTANEOUS at 08:05

## 2020-05-26 RX ADMIN — MAGNESIUM SULFATE 4 G: 4 INJECTION INTRAVENOUS at 09:38

## 2020-05-26 RX ADMIN — BUPRENORPHINE HYDROCHLORIDE AND NALOXONE HYDROCHLORIDE 1.25 TABLET: 8; 2 TABLET SUBLINGUAL at 08:03

## 2020-05-26 RX ADMIN — SODIUM CHLORIDE 100 ML/HR: 9 INJECTION, SOLUTION INTRAVENOUS at 06:11

## 2020-05-26 RX ADMIN — SODIUM CHLORIDE 75 ML/HR: 9 INJECTION, SOLUTION INTRAVENOUS at 17:21

## 2020-05-26 RX ADMIN — CARVEDILOL 6.25 MG: 6.25 TABLET, FILM COATED ORAL at 08:02

## 2020-05-27 ENCOUNTER — TRANSCRIBE ORDERS (OUTPATIENT)
Dept: INFUSION THERAPY | Facility: HOSPITAL | Age: 40
End: 2020-05-27

## 2020-05-27 DIAGNOSIS — Z22.322 MRSA (METHICILLIN RESISTANT STAPH AUREUS) CULTURE POSITIVE: Primary | ICD-10-CM

## 2020-05-27 PROBLEM — M71.022: Status: ACTIVE | Noted: 2020-05-25

## 2020-05-27 LAB
ALBUMIN SERPL-MCNC: 2.47 G/DL (ref 3.5–5.2)
ALBUMIN/GLOB SERPL: 0.5 G/DL
ALP SERPL-CCNC: 158 U/L (ref 39–117)
ALT SERPL W P-5'-P-CCNC: 17 U/L (ref 1–33)
ANION GAP SERPL CALCULATED.3IONS-SCNC: 9.1 MMOL/L (ref 5–15)
AST SERPL-CCNC: 28 U/L (ref 1–32)
BASOPHILS # BLD AUTO: 0.05 10*3/MM3 (ref 0–0.2)
BASOPHILS NFR BLD AUTO: 1 % (ref 0–1.5)
BILIRUB SERPL-MCNC: <0.2 MG/DL (ref 0.2–1.2)
BUN BLD-MCNC: 23 MG/DL (ref 6–20)
BUN/CREAT SERPL: 17.6 (ref 7–25)
CALCIUM SPEC-SCNC: 8.3 MG/DL (ref 8.6–10.5)
CHLORIDE SERPL-SCNC: 102 MMOL/L (ref 98–107)
CO2 SERPL-SCNC: 20.9 MMOL/L (ref 22–29)
CREAT BLD-MCNC: 1.31 MG/DL (ref 0.57–1)
CREAT UR-MCNC: 39.3 MG/DL
DEPRECATED RDW RBC AUTO: 57.9 FL (ref 37–54)
EOSINOPHIL # BLD AUTO: 0.33 10*3/MM3 (ref 0–0.4)
EOSINOPHIL NFR BLD AUTO: 6.5 % (ref 0.3–6.2)
ERYTHROCYTE [DISTWIDTH] IN BLOOD BY AUTOMATED COUNT: 19.4 % (ref 12.3–15.4)
GFR SERPL CREATININE-BSD FRML MDRD: 45 ML/MIN/1.73
GLOBULIN UR ELPH-MCNC: 5.4 GM/DL
GLUCOSE BLD-MCNC: 141 MG/DL (ref 65–99)
GLUCOSE BLDC GLUCOMTR-MCNC: 132 MG/DL (ref 70–130)
GLUCOSE BLDC GLUCOMTR-MCNC: 178 MG/DL (ref 70–130)
GLUCOSE BLDC GLUCOMTR-MCNC: 78 MG/DL (ref 70–130)
GLUCOSE BLDC GLUCOMTR-MCNC: 98 MG/DL (ref 70–130)
HCT VFR BLD AUTO: 27.5 % (ref 34–46.6)
HGB BLD-MCNC: 8 G/DL (ref 12–15.9)
IMM GRANULOCYTES # BLD AUTO: 0.02 10*3/MM3 (ref 0–0.05)
IMM GRANULOCYTES NFR BLD AUTO: 0.4 % (ref 0–0.5)
LYMPHOCYTES # BLD AUTO: 2.08 10*3/MM3 (ref 0.7–3.1)
LYMPHOCYTES NFR BLD AUTO: 40.8 % (ref 19.6–45.3)
MAGNESIUM SERPL-MCNC: 2.3 MG/DL (ref 1.6–2.6)
MCH RBC QN AUTO: 24.4 PG (ref 26.6–33)
MCHC RBC AUTO-ENTMCNC: 29.1 G/DL (ref 31.5–35.7)
MCV RBC AUTO: 83.8 FL (ref 79–97)
MONOCYTES # BLD AUTO: 0.4 10*3/MM3 (ref 0.1–0.9)
MONOCYTES NFR BLD AUTO: 7.8 % (ref 5–12)
NEUTROPHILS # BLD AUTO: 2.22 10*3/MM3 (ref 1.7–7)
NEUTROPHILS NFR BLD AUTO: 43.5 % (ref 42.7–76)
NRBC BLD AUTO-RTO: 0 /100 WBC (ref 0–0.2)
PLATELET # BLD AUTO: 252 10*3/MM3 (ref 140–450)
PMV BLD AUTO: 9.8 FL (ref 6–12)
POTASSIUM BLD-SCNC: 5.2 MMOL/L (ref 3.5–5.2)
PROT SERPL-MCNC: 7.9 G/DL (ref 6–8.5)
PROT UR-MCNC: 156 MG/DL
PROT/CREAT UR: 3969.5 MG/G CREA (ref 0–200)
RBC # BLD AUTO: 3.28 10*6/MM3 (ref 3.77–5.28)
SARS-COV-2 RNA RESP QL NAA+PROBE: NOT DETECTED
SODIUM BLD-SCNC: 132 MMOL/L (ref 136–145)
WBC NRBC COR # BLD: 5.1 10*3/MM3 (ref 3.4–10.8)

## 2020-05-27 PROCEDURE — 25010000002 CEFTRIAXONE PER 250 MG: Performed by: INTERNAL MEDICINE

## 2020-05-27 PROCEDURE — 80053 COMPREHEN METABOLIC PANEL: CPT | Performed by: INTERNAL MEDICINE

## 2020-05-27 PROCEDURE — 25010000002 ENOXAPARIN PER 10 MG: Performed by: INTERNAL MEDICINE

## 2020-05-27 PROCEDURE — C1751 CATH, INF, PER/CENT/MIDLINE: HCPCS

## 2020-05-27 PROCEDURE — 87635 SARS-COV-2 COVID-19 AMP PRB: CPT | Performed by: ORTHOPAEDIC SURGERY

## 2020-05-27 PROCEDURE — 99232 SBSQ HOSP IP/OBS MODERATE 35: CPT | Performed by: INTERNAL MEDICINE

## 2020-05-27 PROCEDURE — 25010000002 VANCOMYCIN 1 G RECONSTITUTED SOLUTION: Performed by: INTERNAL MEDICINE

## 2020-05-27 PROCEDURE — 83735 ASSAY OF MAGNESIUM: CPT | Performed by: INTERNAL MEDICINE

## 2020-05-27 PROCEDURE — 85025 COMPLETE CBC W/AUTO DIFF WBC: CPT | Performed by: INTERNAL MEDICINE

## 2020-05-27 PROCEDURE — 82962 GLUCOSE BLOOD TEST: CPT

## 2020-05-27 PROCEDURE — 63710000001 INSULIN DETEMIR PER 5 UNITS: Performed by: INTERNAL MEDICINE

## 2020-05-27 PROCEDURE — 36410 VNPNXR 3YR/> PHY/QHP DX/THER: CPT

## 2020-05-27 RX ORDER — SODIUM CHLORIDE 0.9 % (FLUSH) 0.9 %
10 SYRINGE (ML) INJECTION EVERY 12 HOURS SCHEDULED
Status: DISCONTINUED | OUTPATIENT
Start: 2020-05-27 | End: 2020-06-15 | Stop reason: HOSPADM

## 2020-05-27 RX ORDER — SODIUM CHLORIDE 0.9 % (FLUSH) 0.9 %
10 SYRINGE (ML) INJECTION AS NEEDED
Status: DISCONTINUED | OUTPATIENT
Start: 2020-05-27 | End: 2020-06-15 | Stop reason: HOSPADM

## 2020-05-27 RX ADMIN — CARVEDILOL 6.25 MG: 6.25 TABLET, FILM COATED ORAL at 08:30

## 2020-05-27 RX ADMIN — CLONIDINE HYDROCHLORIDE 0.2 MG: 0.2 TABLET ORAL at 20:57

## 2020-05-27 RX ADMIN — TIZANIDINE 4 MG: 4 TABLET ORAL at 02:10

## 2020-05-27 RX ADMIN — SODIUM CHLORIDE, PRESERVATIVE FREE 10 ML: 5 INJECTION INTRAVENOUS at 08:32

## 2020-05-27 RX ADMIN — INSULIN DETEMIR 45 UNITS: 100 INJECTION, SOLUTION SUBCUTANEOUS at 20:57

## 2020-05-27 RX ADMIN — INSULIN DETEMIR 45 UNITS: 100 INJECTION, SOLUTION SUBCUTANEOUS at 08:44

## 2020-05-27 RX ADMIN — CEFTRIAXONE 2 G: 2 INJECTION, POWDER, FOR SOLUTION INTRAMUSCULAR; INTRAVENOUS at 06:07

## 2020-05-27 RX ADMIN — SODIUM CHLORIDE 75 ML/HR: 9 INJECTION, SOLUTION INTRAVENOUS at 09:06

## 2020-05-27 RX ADMIN — VANCOMYCIN HYDROCHLORIDE 1000 MG: 1 INJECTION, POWDER, LYOPHILIZED, FOR SOLUTION INTRAVENOUS at 04:55

## 2020-05-27 RX ADMIN — CLONIDINE HYDROCHLORIDE 0.2 MG: 0.2 TABLET ORAL at 08:29

## 2020-05-27 RX ADMIN — CARVEDILOL 6.25 MG: 6.25 TABLET, FILM COATED ORAL at 17:31

## 2020-05-27 RX ADMIN — ENOXAPARIN SODIUM 40 MG: 40 INJECTION SUBCUTANEOUS at 08:31

## 2020-05-27 RX ADMIN — TIZANIDINE 4 MG: 4 TABLET ORAL at 22:35

## 2020-05-27 RX ADMIN — VANCOMYCIN HYDROCHLORIDE 1000 MG: 1 INJECTION, POWDER, LYOPHILIZED, FOR SOLUTION INTRAVENOUS at 22:36

## 2020-05-27 RX ADMIN — TIZANIDINE 4 MG: 4 TABLET ORAL at 13:28

## 2020-05-27 RX ADMIN — BUPRENORPHINE HYDROCHLORIDE AND NALOXONE HYDROCHLORIDE 1.25 TABLET: 8; 2 TABLET SUBLINGUAL at 08:30

## 2020-05-27 RX ADMIN — BACITRACIN: 500 OINTMENT TOPICAL at 08:37

## 2020-05-27 RX ADMIN — BACITRACIN: 500 OINTMENT TOPICAL at 21:02

## 2020-05-27 RX ADMIN — SODIUM CHLORIDE 75 ML/HR: 9 INJECTION, SOLUTION INTRAVENOUS at 22:36

## 2020-05-27 RX ADMIN — ROPINIROLE HYDROCHLORIDE 0.25 MG: 0.25 TABLET, FILM COATED ORAL at 20:57

## 2020-05-27 RX ADMIN — SODIUM CHLORIDE, PRESERVATIVE FREE 10 ML: 5 INJECTION INTRAVENOUS at 21:03

## 2020-05-28 LAB
ALBUMIN SERPL-MCNC: 2.28 G/DL (ref 3.5–5.2)
ALBUMIN/GLOB SERPL: 0.5 G/DL
ALP SERPL-CCNC: 151 U/L (ref 39–117)
ALT SERPL W P-5'-P-CCNC: 19 U/L (ref 1–33)
ANION GAP SERPL CALCULATED.3IONS-SCNC: 5.8 MMOL/L (ref 5–15)
ANION GAP SERPL CALCULATED.3IONS-SCNC: 6.2 MMOL/L (ref 5–15)
ANION GAP SERPL CALCULATED.3IONS-SCNC: 6.2 MMOL/L (ref 5–15)
ANISOCYTOSIS BLD QL: NORMAL
AST SERPL-CCNC: 35 U/L (ref 1–32)
BASOPHILS # BLD AUTO: 0.06 10*3/MM3 (ref 0–0.2)
BASOPHILS NFR BLD AUTO: 1.1 % (ref 0–1.5)
BILIRUB SERPL-MCNC: <0.2 MG/DL (ref 0.2–1.2)
BUN BLD-MCNC: 25 MG/DL (ref 6–20)
BUN BLD-MCNC: 28 MG/DL (ref 6–20)
BUN BLD-MCNC: 29 MG/DL (ref 6–20)
BUN/CREAT SERPL: 16.9 (ref 7–25)
BUN/CREAT SERPL: 20.6 (ref 7–25)
BUN/CREAT SERPL: 22.8 (ref 7–25)
CALCIUM SPEC-SCNC: 7.7 MG/DL (ref 8.6–10.5)
CALCIUM SPEC-SCNC: 8 MG/DL (ref 8.6–10.5)
CALCIUM SPEC-SCNC: 8.1 MG/DL (ref 8.6–10.5)
CHLORIDE SERPL-SCNC: 105 MMOL/L (ref 98–107)
CHLORIDE SERPL-SCNC: 105 MMOL/L (ref 98–107)
CHLORIDE SERPL-SCNC: 107 MMOL/L (ref 98–107)
CO2 SERPL-SCNC: 19.8 MMOL/L (ref 22–29)
CO2 SERPL-SCNC: 20.8 MMOL/L (ref 22–29)
CO2 SERPL-SCNC: 22.2 MMOL/L (ref 22–29)
CREAT BLD-MCNC: 1.27 MG/DL (ref 0.57–1)
CREAT BLD-MCNC: 1.36 MG/DL (ref 0.57–1)
CREAT BLD-MCNC: 1.48 MG/DL (ref 0.57–1)
CRP SERPL-MCNC: 0.17 MG/DL (ref 0–0.5)
DEPRECATED RDW RBC AUTO: 60.3 FL (ref 37–54)
EOSINOPHIL # BLD AUTO: 0.37 10*3/MM3 (ref 0–0.4)
EOSINOPHIL NFR BLD AUTO: 6.7 % (ref 0.3–6.2)
ERYTHROCYTE [DISTWIDTH] IN BLOOD BY AUTOMATED COUNT: 19.8 % (ref 12.3–15.4)
GFR SERPL CREATININE-BSD FRML MDRD: 39 ML/MIN/1.73
GFR SERPL CREATININE-BSD FRML MDRD: 43 ML/MIN/1.73
GFR SERPL CREATININE-BSD FRML MDRD: 46 ML/MIN/1.73
GLOBULIN UR ELPH-MCNC: 4.7 GM/DL
GLUCOSE BLD-MCNC: 184 MG/DL (ref 65–99)
GLUCOSE BLD-MCNC: 257 MG/DL (ref 65–99)
GLUCOSE BLD-MCNC: 75 MG/DL (ref 65–99)
GLUCOSE BLDC GLUCOMTR-MCNC: 177 MG/DL (ref 70–130)
GLUCOSE BLDC GLUCOMTR-MCNC: 274 MG/DL (ref 70–130)
GLUCOSE BLDC GLUCOMTR-MCNC: 66 MG/DL (ref 70–130)
GLUCOSE BLDC GLUCOMTR-MCNC: 89 MG/DL (ref 70–130)
GLUCOSE BLDC GLUCOMTR-MCNC: 92 MG/DL (ref 70–130)
GLUCOSE BLDC GLUCOMTR-MCNC: 99 MG/DL (ref 70–130)
HCT VFR BLD AUTO: 25.4 % (ref 34–46.6)
HGB BLD-MCNC: 7.3 G/DL (ref 12–15.9)
HYPOCHROMIA BLD QL: NORMAL
IMM GRANULOCYTES # BLD AUTO: 0.02 10*3/MM3 (ref 0–0.05)
IMM GRANULOCYTES NFR BLD AUTO: 0.4 % (ref 0–0.5)
LYMPHOCYTES # BLD AUTO: 2.42 10*3/MM3 (ref 0.7–3.1)
LYMPHOCYTES NFR BLD AUTO: 43.8 % (ref 19.6–45.3)
MCH RBC QN AUTO: 24.6 PG (ref 26.6–33)
MCHC RBC AUTO-ENTMCNC: 28.7 G/DL (ref 31.5–35.7)
MCV RBC AUTO: 85.5 FL (ref 79–97)
MONOCYTES # BLD AUTO: 0.48 10*3/MM3 (ref 0.1–0.9)
MONOCYTES NFR BLD AUTO: 8.7 % (ref 5–12)
NEUTROPHILS # BLD AUTO: 2.18 10*3/MM3 (ref 1.7–7)
NEUTROPHILS NFR BLD AUTO: 39.3 % (ref 42.7–76)
NRBC BLD AUTO-RTO: 0 /100 WBC (ref 0–0.2)
PLATELET # BLD AUTO: 245 10*3/MM3 (ref 140–450)
PMV BLD AUTO: 9.7 FL (ref 6–12)
POTASSIUM BLD-SCNC: 5.3 MMOL/L (ref 3.5–5.2)
POTASSIUM BLD-SCNC: 5.6 MMOL/L (ref 3.5–5.2)
POTASSIUM BLD-SCNC: 5.7 MMOL/L (ref 3.5–5.2)
POTASSIUM BLD-SCNC: 6.2 MMOL/L (ref 3.5–5.2)
PROT SERPL-MCNC: 7 G/DL (ref 6–8.5)
RBC # BLD AUTO: 2.97 10*6/MM3 (ref 3.77–5.28)
SMALL PLATELETS BLD QL SMEAR: ADEQUATE
SODIUM BLD-SCNC: 132 MMOL/L (ref 136–145)
SODIUM BLD-SCNC: 133 MMOL/L (ref 136–145)
SODIUM BLD-SCNC: 133 MMOL/L (ref 136–145)
VANCOMYCIN TROUGH SERPL-MCNC: 27.1 MCG/ML (ref 5–20)
WBC NRBC COR # BLD: 5.53 10*3/MM3 (ref 3.4–10.8)

## 2020-05-28 PROCEDURE — 63710000001 INSULIN ASPART PER 5 UNITS: Performed by: INTERNAL MEDICINE

## 2020-05-28 PROCEDURE — 63710000001 INSULIN REGULAR HUMAN PER 5 UNITS: Performed by: HOSPITALIST

## 2020-05-28 PROCEDURE — 99232 SBSQ HOSP IP/OBS MODERATE 35: CPT | Performed by: INTERNAL MEDICINE

## 2020-05-28 PROCEDURE — 80053 COMPREHEN METABOLIC PANEL: CPT | Performed by: INTERNAL MEDICINE

## 2020-05-28 PROCEDURE — 93005 ELECTROCARDIOGRAM TRACING: CPT | Performed by: HOSPITALIST

## 2020-05-28 PROCEDURE — 84132 ASSAY OF SERUM POTASSIUM: CPT | Performed by: INTERNAL MEDICINE

## 2020-05-28 PROCEDURE — 86140 C-REACTIVE PROTEIN: CPT | Performed by: INTERNAL MEDICINE

## 2020-05-28 PROCEDURE — 94799 UNLISTED PULMONARY SVC/PX: CPT

## 2020-05-28 PROCEDURE — 80202 ASSAY OF VANCOMYCIN: CPT | Performed by: INTERNAL MEDICINE

## 2020-05-28 PROCEDURE — 25010000002 ENOXAPARIN PER 10 MG: Performed by: INTERNAL MEDICINE

## 2020-05-28 PROCEDURE — 25010000002 CEFTRIAXONE PER 250 MG: Performed by: INTERNAL MEDICINE

## 2020-05-28 PROCEDURE — 85025 COMPLETE CBC W/AUTO DIFF WBC: CPT | Performed by: INTERNAL MEDICINE

## 2020-05-28 PROCEDURE — 63710000001 INSULIN DETEMIR PER 5 UNITS: Performed by: INTERNAL MEDICINE

## 2020-05-28 PROCEDURE — 82962 GLUCOSE BLOOD TEST: CPT

## 2020-05-28 PROCEDURE — 85007 BL SMEAR W/DIFF WBC COUNT: CPT | Performed by: INTERNAL MEDICINE

## 2020-05-28 PROCEDURE — 93010 ELECTROCARDIOGRAM REPORT: CPT | Performed by: INTERNAL MEDICINE

## 2020-05-28 RX ORDER — SODIUM POLYSTYRENE SULFONATE 4.1 MEQ/G
15 POWDER, FOR SUSPENSION ORAL; RECTAL ONCE
Status: DISCONTINUED | OUTPATIENT
Start: 2020-05-28 | End: 2020-05-28

## 2020-05-28 RX ORDER — DEXTROSE MONOHYDRATE 25 G/50ML
50 INJECTION, SOLUTION INTRAVENOUS ONCE
Status: DISCONTINUED | OUTPATIENT
Start: 2020-05-28 | End: 2020-05-28

## 2020-05-28 RX ORDER — SODIUM POLYSTYRENE SULFONATE 4.1 MEQ/G
30 POWDER, FOR SUSPENSION ORAL; RECTAL ONCE
Status: DISCONTINUED | OUTPATIENT
Start: 2020-05-28 | End: 2020-05-31

## 2020-05-28 RX ORDER — LACTULOSE 10 G/15ML
30 SOLUTION ORAL ONCE
Status: DISCONTINUED | OUTPATIENT
Start: 2020-05-28 | End: 2020-05-28

## 2020-05-28 RX ORDER — LACTULOSE 10 G/15ML
30 SOLUTION ORAL ONCE
Status: COMPLETED | OUTPATIENT
Start: 2020-05-28 | End: 2020-05-28

## 2020-05-28 RX ORDER — SODIUM POLYSTYRENE SULFONATE 4.1 MEQ/G
15 POWDER, FOR SUSPENSION ORAL; RECTAL ONCE
Status: COMPLETED | OUTPATIENT
Start: 2020-05-28 | End: 2020-05-28

## 2020-05-28 RX ORDER — SODIUM POLYSTYRENE SULFONATE 4.1 MEQ/G
30 POWDER, FOR SUSPENSION ORAL; RECTAL ONCE
Status: COMPLETED | OUTPATIENT
Start: 2020-05-28 | End: 2020-05-28

## 2020-05-28 RX ADMIN — CARVEDILOL 6.25 MG: 6.25 TABLET, FILM COATED ORAL at 18:06

## 2020-05-28 RX ADMIN — LACTULOSE 30 G: 10 SOLUTION ORAL at 15:40

## 2020-05-28 RX ADMIN — SODIUM POLYSTYRENE SULFONATE 30 G: 1 POWDER ORAL; RECTAL at 02:23

## 2020-05-28 RX ADMIN — SODIUM CHLORIDE 75 ML/HR: 9 INJECTION, SOLUTION INTRAVENOUS at 17:52

## 2020-05-28 RX ADMIN — ENOXAPARIN SODIUM 40 MG: 40 INJECTION SUBCUTANEOUS at 11:55

## 2020-05-28 RX ADMIN — CEFTRIAXONE 2 G: 2 INJECTION, POWDER, FOR SOLUTION INTRAMUSCULAR; INTRAVENOUS at 06:37

## 2020-05-28 RX ADMIN — HUMAN INSULIN 5 UNITS: 100 INJECTION, SOLUTION SUBCUTANEOUS at 02:23

## 2020-05-28 RX ADMIN — INSULIN DETEMIR 45 UNITS: 100 INJECTION, SOLUTION SUBCUTANEOUS at 21:30

## 2020-05-28 RX ADMIN — BACITRACIN: 500 OINTMENT TOPICAL at 09:27

## 2020-05-28 RX ADMIN — SODIUM CHLORIDE, PRESERVATIVE FREE 10 ML: 5 INJECTION INTRAVENOUS at 21:27

## 2020-05-28 RX ADMIN — SODIUM CHLORIDE 500 ML: 9 INJECTION, SOLUTION INTRAVENOUS at 02:24

## 2020-05-28 RX ADMIN — CARVEDILOL 6.25 MG: 6.25 TABLET, FILM COATED ORAL at 11:56

## 2020-05-28 RX ADMIN — SODIUM CHLORIDE, PRESERVATIVE FREE 10 ML: 5 INJECTION INTRAVENOUS at 09:28

## 2020-05-28 RX ADMIN — ROPINIROLE HYDROCHLORIDE 0.25 MG: 0.25 TABLET, FILM COATED ORAL at 21:25

## 2020-05-28 RX ADMIN — INSULIN ASPART 6 UNITS: 100 INJECTION, SOLUTION INTRAVENOUS; SUBCUTANEOUS at 17:36

## 2020-05-28 RX ADMIN — BACITRACIN: 500 OINTMENT TOPICAL at 21:24

## 2020-05-28 RX ADMIN — FOLIC ACID 1 MG: 1 TABLET ORAL at 09:21

## 2020-05-28 RX ADMIN — BUPRENORPHINE HYDROCHLORIDE AND NALOXONE HYDROCHLORIDE 1.25 TABLET: 8; 2 TABLET SUBLINGUAL at 10:51

## 2020-05-28 RX ADMIN — DEXTROSE MONOHYDRATE 25 G: 500 INJECTION PARENTERAL at 07:13

## 2020-05-28 RX ADMIN — TIZANIDINE 4 MG: 4 TABLET ORAL at 21:28

## 2020-05-28 RX ADMIN — CLONIDINE HYDROCHLORIDE 0.2 MG: 0.2 TABLET ORAL at 11:57

## 2020-05-28 RX ADMIN — Medication 1 CAPSULE: at 09:21

## 2020-05-28 RX ADMIN — SODIUM POLYSTYRENE SULFONATE 15 G: 1 POWDER ORAL; RECTAL at 09:21

## 2020-05-29 ENCOUNTER — HOSPITAL ENCOUNTER (OUTPATIENT)
Dept: INFUSION THERAPY | Facility: HOSPITAL | Age: 40
End: 2020-05-29

## 2020-05-29 LAB
ABO GROUP BLD: NORMAL
ABO GROUP BLD: NORMAL
ALBUMIN SERPL-MCNC: 2.14 G/DL (ref 3.5–5.2)
ALBUMIN/GLOB SERPL: 0.5 G/DL
ALP SERPL-CCNC: 146 U/L (ref 39–117)
ALT SERPL W P-5'-P-CCNC: 19 U/L (ref 1–33)
ANION GAP SERPL CALCULATED.3IONS-SCNC: 7.7 MMOL/L (ref 5–15)
ANISOCYTOSIS BLD QL: ABNORMAL
AST SERPL-CCNC: 31 U/L (ref 1–32)
BASOPHILS # BLD MANUAL: 0.25 10*3/MM3 (ref 0–0.2)
BASOPHILS NFR BLD AUTO: 4 % (ref 0–1.5)
BILIRUB SERPL-MCNC: <0.2 MG/DL (ref 0.2–1.2)
BLD GP AB SCN SERPL QL: NEGATIVE
BUN BLD-MCNC: 28 MG/DL (ref 6–20)
BUN/CREAT SERPL: 22 (ref 7–25)
CALCIUM SPEC-SCNC: 7.7 MG/DL (ref 8.6–10.5)
CHLORIDE SERPL-SCNC: 105 MMOL/L (ref 98–107)
CO2 SERPL-SCNC: 19.3 MMOL/L (ref 22–29)
CREAT BLD-MCNC: 1.27 MG/DL (ref 0.57–1)
DEPRECATED RDW RBC AUTO: 62.9 FL (ref 37–54)
EOSINOPHIL # BLD MANUAL: 0.44 10*3/MM3 (ref 0–0.4)
EOSINOPHIL NFR BLD MANUAL: 7 % (ref 0.3–6.2)
ERYTHROCYTE [DISTWIDTH] IN BLOOD BY AUTOMATED COUNT: 20.1 % (ref 12.3–15.4)
GFR SERPL CREATININE-BSD FRML MDRD: 46 ML/MIN/1.73
GLOBULIN UR ELPH-MCNC: 4.7 GM/DL
GLUCOSE BLD-MCNC: 252 MG/DL (ref 65–99)
GLUCOSE BLDC GLUCOMTR-MCNC: 123 MG/DL (ref 70–130)
GLUCOSE BLDC GLUCOMTR-MCNC: 129 MG/DL (ref 70–130)
GLUCOSE BLDC GLUCOMTR-MCNC: 210 MG/DL (ref 70–130)
GLUCOSE BLDC GLUCOMTR-MCNC: 87 MG/DL (ref 70–130)
HCT VFR BLD AUTO: 27.3 % (ref 34–46.6)
HGB BLD-MCNC: 7.7 G/DL (ref 12–15.9)
HYPOCHROMIA BLD QL: ABNORMAL
LYMPHOCYTES # BLD MANUAL: 2.73 10*3/MM3 (ref 0.7–3.1)
LYMPHOCYTES NFR BLD MANUAL: 4 % (ref 5–12)
LYMPHOCYTES NFR BLD MANUAL: 43 % (ref 19.6–45.3)
MCH RBC QN AUTO: 24.6 PG (ref 26.6–33)
MCHC RBC AUTO-ENTMCNC: 28.2 G/DL (ref 31.5–35.7)
MCV RBC AUTO: 87.2 FL (ref 79–97)
MONOCYTES # BLD AUTO: 0.25 10*3/MM3 (ref 0.1–0.9)
NEUTROPHILS # BLD AUTO: 2.67 10*3/MM3 (ref 1.7–7)
NEUTROPHILS NFR BLD MANUAL: 42 % (ref 42.7–76)
PLAT MORPH BLD: NORMAL
PLATELET # BLD AUTO: 245 10*3/MM3 (ref 140–450)
PMV BLD AUTO: 9.9 FL (ref 6–12)
POTASSIUM BLD-SCNC: 5.5 MMOL/L (ref 3.5–5.2)
POTASSIUM BLD-SCNC: 5.5 MMOL/L (ref 3.5–5.2)
POTASSIUM BLD-SCNC: 5.6 MMOL/L (ref 3.5–5.2)
PROT SERPL-MCNC: 6.8 G/DL (ref 6–8.5)
RBC # BLD AUTO: 3.13 10*6/MM3 (ref 3.77–5.28)
RH BLD: POSITIVE
RH BLD: POSITIVE
SCAN SLIDE: NORMAL
SODIUM BLD-SCNC: 132 MMOL/L (ref 136–145)
T&S EXPIRATION DATE: NORMAL
VANCOMYCIN SERPL-MCNC: 22 MCG/ML (ref 5–40)
WBC NRBC COR # BLD: 6.35 10*3/MM3 (ref 3.4–10.8)

## 2020-05-29 PROCEDURE — 80053 COMPREHEN METABOLIC PANEL: CPT | Performed by: INTERNAL MEDICINE

## 2020-05-29 PROCEDURE — 86850 RBC ANTIBODY SCREEN: CPT | Performed by: HOSPITALIST

## 2020-05-29 PROCEDURE — 93010 ELECTROCARDIOGRAM REPORT: CPT | Performed by: INTERNAL MEDICINE

## 2020-05-29 PROCEDURE — 99232 SBSQ HOSP IP/OBS MODERATE 35: CPT | Performed by: INTERNAL MEDICINE

## 2020-05-29 PROCEDURE — 63710000001 INSULIN REGULAR HUMAN PER 5 UNITS: Performed by: INTERNAL MEDICINE

## 2020-05-29 PROCEDURE — 86901 BLOOD TYPING SEROLOGIC RH(D): CPT | Performed by: HOSPITALIST

## 2020-05-29 PROCEDURE — 25010000002 ENOXAPARIN PER 10 MG: Performed by: INTERNAL MEDICINE

## 2020-05-29 PROCEDURE — 86900 BLOOD TYPING SEROLOGIC ABO: CPT | Performed by: HOSPITALIST

## 2020-05-29 PROCEDURE — 80202 ASSAY OF VANCOMYCIN: CPT | Performed by: INTERNAL MEDICINE

## 2020-05-29 PROCEDURE — 25010000002 CALCIUM GLUCONATE-NACL 1-0.675 GM/50ML-% SOLUTION: Performed by: INTERNAL MEDICINE

## 2020-05-29 PROCEDURE — 84132 ASSAY OF SERUM POTASSIUM: CPT | Performed by: INTERNAL MEDICINE

## 2020-05-29 PROCEDURE — 85007 BL SMEAR W/DIFF WBC COUNT: CPT | Performed by: INTERNAL MEDICINE

## 2020-05-29 PROCEDURE — 63710000001 INSULIN DETEMIR PER 5 UNITS: Performed by: INTERNAL MEDICINE

## 2020-05-29 PROCEDURE — 93005 ELECTROCARDIOGRAM TRACING: CPT | Performed by: INTERNAL MEDICINE

## 2020-05-29 PROCEDURE — 63710000001 INSULIN ASPART PER 5 UNITS: Performed by: INTERNAL MEDICINE

## 2020-05-29 PROCEDURE — 85025 COMPLETE CBC W/AUTO DIFF WBC: CPT | Performed by: INTERNAL MEDICINE

## 2020-05-29 PROCEDURE — 86900 BLOOD TYPING SEROLOGIC ABO: CPT

## 2020-05-29 PROCEDURE — 86901 BLOOD TYPING SEROLOGIC RH(D): CPT

## 2020-05-29 PROCEDURE — 82962 GLUCOSE BLOOD TEST: CPT

## 2020-05-29 PROCEDURE — 25010000002 CEFTRIAXONE PER 250 MG: Performed by: INTERNAL MEDICINE

## 2020-05-29 PROCEDURE — 94799 UNLISTED PULMONARY SVC/PX: CPT

## 2020-05-29 PROCEDURE — 94640 AIRWAY INHALATION TREATMENT: CPT

## 2020-05-29 RX ORDER — CALCIUM GLUCONATE 20 MG/ML
1 INJECTION, SOLUTION INTRAVENOUS ONCE
Status: COMPLETED | OUTPATIENT
Start: 2020-05-29 | End: 2020-05-29

## 2020-05-29 RX ORDER — DEXTROSE MONOHYDRATE 25 G/50ML
50 INJECTION, SOLUTION INTRAVENOUS ONCE
Status: COMPLETED | OUTPATIENT
Start: 2020-05-29 | End: 2020-05-29

## 2020-05-29 RX ORDER — SODIUM POLYSTYRENE SULFONATE 4.1 MEQ/G
30 POWDER, FOR SUSPENSION ORAL; RECTAL ONCE
Status: COMPLETED | OUTPATIENT
Start: 2020-05-29 | End: 2020-05-29

## 2020-05-29 RX ORDER — LACTULOSE 10 G/15ML
30 SOLUTION ORAL ONCE
Status: COMPLETED | OUTPATIENT
Start: 2020-05-29 | End: 2020-05-29

## 2020-05-29 RX ADMIN — Medication 1 CAPSULE: at 08:22

## 2020-05-29 RX ADMIN — CLONIDINE HYDROCHLORIDE 0.2 MG: 0.2 TABLET ORAL at 08:21

## 2020-05-29 RX ADMIN — TIZANIDINE 4 MG: 4 TABLET ORAL at 09:16

## 2020-05-29 RX ADMIN — SODIUM CHLORIDE, PRESERVATIVE FREE 10 ML: 5 INJECTION INTRAVENOUS at 08:24

## 2020-05-29 RX ADMIN — SODIUM BICARBONATE 50 MEQ: 84 INJECTION, SOLUTION INTRAVENOUS at 22:39

## 2020-05-29 RX ADMIN — BACITRACIN: 500 OINTMENT TOPICAL at 22:34

## 2020-05-29 RX ADMIN — ENOXAPARIN SODIUM 40 MG: 40 INJECTION SUBCUTANEOUS at 08:22

## 2020-05-29 RX ADMIN — CALCIUM GLUCONATE 1 G: 20 INJECTION, SOLUTION INTRAVENOUS at 22:59

## 2020-05-29 RX ADMIN — CARVEDILOL 6.25 MG: 6.25 TABLET, FILM COATED ORAL at 08:22

## 2020-05-29 RX ADMIN — VANCOMYCIN HYDROCHLORIDE 1000 MG: 1 INJECTION, POWDER, LYOPHILIZED, FOR SOLUTION INTRAVENOUS at 09:10

## 2020-05-29 RX ADMIN — CEFTRIAXONE 2 G: 2 INJECTION, POWDER, FOR SOLUTION INTRAMUSCULAR; INTRAVENOUS at 08:20

## 2020-05-29 RX ADMIN — SODIUM CHLORIDE, PRESERVATIVE FREE 10 ML: 5 INJECTION INTRAVENOUS at 22:43

## 2020-05-29 RX ADMIN — FOLIC ACID 1 MG: 1 TABLET ORAL at 08:21

## 2020-05-29 RX ADMIN — LACTULOSE 30 G: 10 SOLUTION ORAL at 11:09

## 2020-05-29 RX ADMIN — INSULIN ASPART 4 UNITS: 100 INJECTION, SOLUTION INTRAVENOUS; SUBCUTANEOUS at 12:35

## 2020-05-29 RX ADMIN — HUMAN INSULIN 10 UNITS: 100 INJECTION, SOLUTION SUBCUTANEOUS at 22:28

## 2020-05-29 RX ADMIN — SODIUM CHLORIDE, PRESERVATIVE FREE 10 ML: 5 INJECTION INTRAVENOUS at 22:44

## 2020-05-29 RX ADMIN — INSULIN DETEMIR 45 UNITS: 100 INJECTION, SOLUTION SUBCUTANEOUS at 22:29

## 2020-05-29 RX ADMIN — ALBUTEROL SULFATE 10 MG: 2.5 SOLUTION RESPIRATORY (INHALATION) at 19:57

## 2020-05-29 RX ADMIN — CARVEDILOL 6.25 MG: 6.25 TABLET, FILM COATED ORAL at 17:38

## 2020-05-29 RX ADMIN — SODIUM POLYSTYRENE SULFONATE 30 G: 1 POWDER ORAL; RECTAL at 22:35

## 2020-05-29 RX ADMIN — TIZANIDINE 4 MG: 4 TABLET ORAL at 17:39

## 2020-05-29 RX ADMIN — BACITRACIN: 500 OINTMENT TOPICAL at 08:23

## 2020-05-29 RX ADMIN — INSULIN DETEMIR 45 UNITS: 100 INJECTION, SOLUTION SUBCUTANEOUS at 09:10

## 2020-05-29 RX ADMIN — SODIUM CHLORIDE 75 ML/HR: 9 INJECTION, SOLUTION INTRAVENOUS at 08:20

## 2020-05-29 RX ADMIN — ROPINIROLE HYDROCHLORIDE 0.25 MG: 0.25 TABLET, FILM COATED ORAL at 22:43

## 2020-05-29 RX ADMIN — DEXTROSE MONOHYDRATE 50 ML: 500 INJECTION PARENTERAL at 22:21

## 2020-05-29 RX ADMIN — BUPRENORPHINE HYDROCHLORIDE AND NALOXONE HYDROCHLORIDE 1.25 TABLET: 8; 2 TABLET SUBLINGUAL at 09:09

## 2020-05-30 ENCOUNTER — APPOINTMENT (OUTPATIENT)
Dept: GENERAL RADIOLOGY | Facility: HOSPITAL | Age: 40
End: 2020-05-30

## 2020-05-30 LAB
ANION GAP SERPL CALCULATED.3IONS-SCNC: 8.2 MMOL/L (ref 5–15)
BACTERIA SPEC AEROBE CULT: NORMAL
BACTERIA SPEC AEROBE CULT: NORMAL
BASOPHILS # BLD AUTO: 0.06 10*3/MM3 (ref 0–0.2)
BASOPHILS NFR BLD AUTO: 0.9 % (ref 0–1.5)
BUN BLD-MCNC: 29 MG/DL (ref 6–20)
BUN/CREAT SERPL: 26.6 (ref 7–25)
CALCIUM SPEC-SCNC: 8.4 MG/DL (ref 8.6–10.5)
CHLORIDE SERPL-SCNC: 104 MMOL/L (ref 98–107)
CO2 SERPL-SCNC: 20.8 MMOL/L (ref 22–29)
CREAT BLD-MCNC: 1.09 MG/DL (ref 0.57–1)
DEPRECATED RDW RBC AUTO: 60.7 FL (ref 37–54)
EOSINOPHIL # BLD AUTO: 0.42 10*3/MM3 (ref 0–0.4)
EOSINOPHIL NFR BLD AUTO: 6.3 % (ref 0.3–6.2)
ERYTHROCYTE [DISTWIDTH] IN BLOOD BY AUTOMATED COUNT: 20 % (ref 12.3–15.4)
GFR SERPL CREATININE-BSD FRML MDRD: 55 ML/MIN/1.73
GLUCOSE BLD-MCNC: 79 MG/DL (ref 65–99)
GLUCOSE BLDC GLUCOMTR-MCNC: 108 MG/DL (ref 70–130)
GLUCOSE BLDC GLUCOMTR-MCNC: 160 MG/DL (ref 70–130)
GLUCOSE BLDC GLUCOMTR-MCNC: 88 MG/DL (ref 70–130)
GLUCOSE BLDC GLUCOMTR-MCNC: 93 MG/DL (ref 70–130)
HCT VFR BLD AUTO: 25.5 % (ref 34–46.6)
HGB BLD-MCNC: 7.4 G/DL (ref 12–15.9)
IMM GRANULOCYTES # BLD AUTO: 0.03 10*3/MM3 (ref 0–0.05)
IMM GRANULOCYTES NFR BLD AUTO: 0.4 % (ref 0–0.5)
LYMPHOCYTES # BLD AUTO: 2.28 10*3/MM3 (ref 0.7–3.1)
LYMPHOCYTES NFR BLD AUTO: 34 % (ref 19.6–45.3)
MCH RBC QN AUTO: 24.7 PG (ref 26.6–33)
MCHC RBC AUTO-ENTMCNC: 29 G/DL (ref 31.5–35.7)
MCV RBC AUTO: 85.3 FL (ref 79–97)
MONOCYTES # BLD AUTO: 0.6 10*3/MM3 (ref 0.1–0.9)
MONOCYTES NFR BLD AUTO: 8.9 % (ref 5–12)
NEUTROPHILS # BLD AUTO: 3.32 10*3/MM3 (ref 1.7–7)
NEUTROPHILS NFR BLD AUTO: 49.5 % (ref 42.7–76)
NRBC BLD AUTO-RTO: 0 /100 WBC (ref 0–0.2)
PLATELET # BLD AUTO: 271 10*3/MM3 (ref 140–450)
PMV BLD AUTO: 10.4 FL (ref 6–12)
POTASSIUM BLD-SCNC: 5.7 MMOL/L (ref 3.5–5.2)
POTASSIUM BLD-SCNC: 5.8 MMOL/L (ref 3.5–5.2)
POTASSIUM BLD-SCNC: 5.8 MMOL/L (ref 3.5–5.2)
RBC # BLD AUTO: 2.99 10*6/MM3 (ref 3.77–5.28)
SODIUM BLD-SCNC: 133 MMOL/L (ref 136–145)
VANCOMYCIN SERPL-MCNC: 27 MCG/ML (ref 5–40)
WBC NRBC COR # BLD: 6.71 10*3/MM3 (ref 3.4–10.8)

## 2020-05-30 PROCEDURE — 80202 ASSAY OF VANCOMYCIN: CPT | Performed by: INTERNAL MEDICINE

## 2020-05-30 PROCEDURE — 94799 UNLISTED PULMONARY SVC/PX: CPT

## 2020-05-30 PROCEDURE — 25010000002 ENOXAPARIN PER 10 MG: Performed by: INTERNAL MEDICINE

## 2020-05-30 PROCEDURE — 63710000001 INSULIN REGULAR HUMAN PER 5 UNITS: Performed by: INTERNAL MEDICINE

## 2020-05-30 PROCEDURE — 93010 ELECTROCARDIOGRAM REPORT: CPT | Performed by: INTERNAL MEDICINE

## 2020-05-30 PROCEDURE — 82962 GLUCOSE BLOOD TEST: CPT

## 2020-05-30 PROCEDURE — 25010000002 CEFTRIAXONE PER 250 MG: Performed by: INTERNAL MEDICINE

## 2020-05-30 PROCEDURE — 93005 ELECTROCARDIOGRAM TRACING: CPT | Performed by: INTERNAL MEDICINE

## 2020-05-30 PROCEDURE — 25010000002 ONDANSETRON PER 1 MG: Performed by: INTERNAL MEDICINE

## 2020-05-30 PROCEDURE — 63710000001 INSULIN DETEMIR PER 5 UNITS: Performed by: INTERNAL MEDICINE

## 2020-05-30 PROCEDURE — 25010000002 CALCIUM GLUCONATE-NACL 1-0.675 GM/50ML-% SOLUTION: Performed by: INTERNAL MEDICINE

## 2020-05-30 PROCEDURE — 84132 ASSAY OF SERUM POTASSIUM: CPT | Performed by: INTERNAL MEDICINE

## 2020-05-30 PROCEDURE — 99232 SBSQ HOSP IP/OBS MODERATE 35: CPT | Performed by: INTERNAL MEDICINE

## 2020-05-30 PROCEDURE — 80048 BASIC METABOLIC PNL TOTAL CA: CPT | Performed by: INTERNAL MEDICINE

## 2020-05-30 PROCEDURE — 85025 COMPLETE CBC W/AUTO DIFF WBC: CPT | Performed by: INTERNAL MEDICINE

## 2020-05-30 PROCEDURE — 74018 RADEX ABDOMEN 1 VIEW: CPT

## 2020-05-30 RX ORDER — CALCIUM GLUCONATE 20 MG/ML
1 INJECTION, SOLUTION INTRAVENOUS ONCE
Status: COMPLETED | OUTPATIENT
Start: 2020-05-30 | End: 2020-05-30

## 2020-05-30 RX ORDER — LACTULOSE 10 G/15ML
30 SOLUTION ORAL 3 TIMES DAILY
Status: DISCONTINUED | OUTPATIENT
Start: 2020-05-30 | End: 2020-06-15 | Stop reason: HOSPADM

## 2020-05-30 RX ORDER — SODIUM POLYSTYRENE SULFONATE 4.1 MEQ/G
30 POWDER, FOR SUSPENSION ORAL; RECTAL ONCE
Status: COMPLETED | OUTPATIENT
Start: 2020-05-30 | End: 2020-05-30

## 2020-05-30 RX ORDER — LACTULOSE 10 G/15ML
30 SOLUTION ORAL 2 TIMES DAILY
Status: DISCONTINUED | OUTPATIENT
Start: 2020-05-30 | End: 2020-05-30

## 2020-05-30 RX ORDER — ONDANSETRON 2 MG/ML
4 INJECTION INTRAMUSCULAR; INTRAVENOUS EVERY 6 HOURS PRN
Status: DISCONTINUED | OUTPATIENT
Start: 2020-05-30 | End: 2020-06-15 | Stop reason: HOSPADM

## 2020-05-30 RX ORDER — DEXTROSE MONOHYDRATE 25 G/50ML
50 INJECTION, SOLUTION INTRAVENOUS ONCE
Status: COMPLETED | OUTPATIENT
Start: 2020-05-30 | End: 2020-05-30

## 2020-05-30 RX ADMIN — ENOXAPARIN SODIUM 40 MG: 40 INJECTION SUBCUTANEOUS at 08:52

## 2020-05-30 RX ADMIN — CLONIDINE HYDROCHLORIDE 0.2 MG: 0.2 TABLET ORAL at 08:51

## 2020-05-30 RX ADMIN — SODIUM CHLORIDE, PRESERVATIVE FREE 10 ML: 5 INJECTION INTRAVENOUS at 21:14

## 2020-05-30 RX ADMIN — SODIUM CHLORIDE, PRESERVATIVE FREE 10 ML: 5 INJECTION INTRAVENOUS at 08:54

## 2020-05-30 RX ADMIN — INSULIN DETEMIR 45 UNITS: 100 INJECTION, SOLUTION SUBCUTANEOUS at 08:47

## 2020-05-30 RX ADMIN — SODIUM BICARBONATE 50 MEQ: 84 INJECTION, SOLUTION INTRAVENOUS at 08:41

## 2020-05-30 RX ADMIN — LACTULOSE 30 G: 10 SOLUTION ORAL at 08:49

## 2020-05-30 RX ADMIN — CARVEDILOL 6.25 MG: 6.25 TABLET, FILM COATED ORAL at 08:51

## 2020-05-30 RX ADMIN — SODIUM CHLORIDE, PRESERVATIVE FREE 10 ML: 5 INJECTION INTRAVENOUS at 21:18

## 2020-05-30 RX ADMIN — SODIUM CHLORIDE, PRESERVATIVE FREE 10 ML: 5 INJECTION INTRAVENOUS at 08:51

## 2020-05-30 RX ADMIN — ONDANSETRON 4 MG: 2 INJECTION INTRAMUSCULAR; INTRAVENOUS at 18:17

## 2020-05-30 RX ADMIN — CALCIUM GLUCONATE 1 G: 20 INJECTION, SOLUTION INTRAVENOUS at 08:41

## 2020-05-30 RX ADMIN — ALBUTEROL SULFATE 10 MG: 2.5 SOLUTION RESPIRATORY (INHALATION) at 08:26

## 2020-05-30 RX ADMIN — INSULIN DETEMIR 45 UNITS: 100 INJECTION, SOLUTION SUBCUTANEOUS at 21:26

## 2020-05-30 RX ADMIN — HUMAN INSULIN 10 UNITS: 100 INJECTION, SOLUTION SUBCUTANEOUS at 08:47

## 2020-05-30 RX ADMIN — DEXTROSE MONOHYDRATE 50 ML: 25 INJECTION, SOLUTION INTRAVENOUS at 08:41

## 2020-05-30 RX ADMIN — TIZANIDINE 4 MG: 4 TABLET ORAL at 01:38

## 2020-05-30 RX ADMIN — BACITRACIN: 500 OINTMENT TOPICAL at 21:13

## 2020-05-30 RX ADMIN — BACITRACIN: 500 OINTMENT TOPICAL at 08:54

## 2020-05-30 RX ADMIN — LACTULOSE 30 G: 10 SOLUTION ORAL at 17:04

## 2020-05-30 RX ADMIN — SODIUM POLYSTYRENE SULFONATE 30 G: 1 POWDER ORAL; RECTAL at 08:49

## 2020-05-30 RX ADMIN — CLONIDINE HYDROCHLORIDE 0.2 MG: 0.2 TABLET ORAL at 21:13

## 2020-05-30 RX ADMIN — CARVEDILOL 6.25 MG: 6.25 TABLET, FILM COATED ORAL at 17:04

## 2020-05-30 RX ADMIN — BUPRENORPHINE HYDROCHLORIDE AND NALOXONE HYDROCHLORIDE 1.25 TABLET: 8; 2 TABLET SUBLINGUAL at 08:52

## 2020-05-30 RX ADMIN — FOLIC ACID 1 MG: 1 TABLET ORAL at 09:00

## 2020-05-30 RX ADMIN — CEFTRIAXONE 2 G: 2 INJECTION, POWDER, FOR SOLUTION INTRAMUSCULAR; INTRAVENOUS at 08:49

## 2020-05-30 RX ADMIN — TIZANIDINE 4 MG: 4 TABLET ORAL at 21:13

## 2020-05-30 RX ADMIN — Medication 1 CAPSULE: at 09:00

## 2020-05-30 RX ADMIN — TIZANIDINE 4 MG: 4 TABLET ORAL at 08:51

## 2020-05-30 RX ADMIN — ROPINIROLE HYDROCHLORIDE 0.25 MG: 0.25 TABLET, FILM COATED ORAL at 21:13

## 2020-05-31 LAB
ALBUMIN SERPL-MCNC: 2.2 G/DL (ref 3.5–5.2)
ALBUMIN/GLOB SERPL: 0.4 G/DL
ALP SERPL-CCNC: 147 U/L (ref 39–117)
ALT SERPL W P-5'-P-CCNC: 19 U/L (ref 1–33)
ANION GAP SERPL CALCULATED.3IONS-SCNC: 7.5 MMOL/L (ref 5–15)
ANISOCYTOSIS BLD QL: NORMAL
AST SERPL-CCNC: 30 U/L (ref 1–32)
BASOPHILS # BLD AUTO: 0.05 10*3/MM3 (ref 0–0.2)
BASOPHILS NFR BLD AUTO: 0.8 % (ref 0–1.5)
BILIRUB SERPL-MCNC: <0.2 MG/DL (ref 0.2–1.2)
BUN BLD-MCNC: 34 MG/DL (ref 6–20)
BUN/CREAT SERPL: 28.8 (ref 7–25)
CALCIUM SPEC-SCNC: 8.2 MG/DL (ref 8.6–10.5)
CHLORIDE SERPL-SCNC: 103 MMOL/L (ref 98–107)
CO2 SERPL-SCNC: 22.5 MMOL/L (ref 22–29)
CREAT BLD-MCNC: 1.18 MG/DL (ref 0.57–1)
DEPRECATED RDW RBC AUTO: 63.7 FL (ref 37–54)
EOSINOPHIL # BLD AUTO: 0.42 10*3/MM3 (ref 0–0.4)
EOSINOPHIL NFR BLD AUTO: 6.6 % (ref 0.3–6.2)
ERYTHROCYTE [DISTWIDTH] IN BLOOD BY AUTOMATED COUNT: 20.6 % (ref 12.3–15.4)
GFR SERPL CREATININE-BSD FRML MDRD: 50 ML/MIN/1.73
GLOBULIN UR ELPH-MCNC: 5 GM/DL
GLUCOSE BLD-MCNC: 206 MG/DL (ref 65–99)
GLUCOSE BLDC GLUCOMTR-MCNC: 123 MG/DL (ref 70–130)
GLUCOSE BLDC GLUCOMTR-MCNC: 125 MG/DL (ref 70–130)
GLUCOSE BLDC GLUCOMTR-MCNC: 156 MG/DL (ref 70–130)
GLUCOSE BLDC GLUCOMTR-MCNC: 222 MG/DL (ref 70–130)
HCT VFR BLD AUTO: 26.3 % (ref 34–46.6)
HGB BLD-MCNC: 7.4 G/DL (ref 12–15.9)
HYPOCHROMIA BLD QL: NORMAL
IMM GRANULOCYTES # BLD AUTO: 0.03 10*3/MM3 (ref 0–0.05)
IMM GRANULOCYTES NFR BLD AUTO: 0.5 % (ref 0–0.5)
LYMPHOCYTES # BLD AUTO: 1.99 10*3/MM3 (ref 0.7–3.1)
LYMPHOCYTES NFR BLD AUTO: 31.1 % (ref 19.6–45.3)
MCH RBC QN AUTO: 24.7 PG (ref 26.6–33)
MCHC RBC AUTO-ENTMCNC: 28.1 G/DL (ref 31.5–35.7)
MCV RBC AUTO: 87.7 FL (ref 79–97)
MONOCYTES # BLD AUTO: 0.54 10*3/MM3 (ref 0.1–0.9)
MONOCYTES NFR BLD AUTO: 8.5 % (ref 5–12)
NEUTROPHILS # BLD AUTO: 3.36 10*3/MM3 (ref 1.7–7)
NEUTROPHILS NFR BLD AUTO: 52.5 % (ref 42.7–76)
NRBC BLD AUTO-RTO: 0 /100 WBC (ref 0–0.2)
PLAT MORPH BLD: NORMAL
PLATELET # BLD AUTO: 270 10*3/MM3 (ref 140–450)
PMV BLD AUTO: 10.6 FL (ref 6–12)
POTASSIUM BLD-SCNC: 6 MMOL/L (ref 3.5–5.2)
PROT SERPL-MCNC: 7.2 G/DL (ref 6–8.5)
RBC # BLD AUTO: 3 10*6/MM3 (ref 3.77–5.28)
SODIUM BLD-SCNC: 133 MMOL/L (ref 136–145)
VANCOMYCIN SERPL-MCNC: 17.8 MCG/ML (ref 5–40)
WBC NRBC COR # BLD: 6.39 10*3/MM3 (ref 3.4–10.8)

## 2020-05-31 PROCEDURE — 63710000001 INSULIN ASPART PER 5 UNITS: Performed by: INTERNAL MEDICINE

## 2020-05-31 PROCEDURE — 82962 GLUCOSE BLOOD TEST: CPT

## 2020-05-31 PROCEDURE — 25010000002 METHYLNALTREXONE 12 MG/0.6ML SOLUTION: Performed by: INTERNAL MEDICINE

## 2020-05-31 PROCEDURE — 80053 COMPREHEN METABOLIC PANEL: CPT | Performed by: INTERNAL MEDICINE

## 2020-05-31 PROCEDURE — 25010000002 HYDRALAZINE PER 20 MG: Performed by: INTERNAL MEDICINE

## 2020-05-31 PROCEDURE — 80202 ASSAY OF VANCOMYCIN: CPT | Performed by: INTERNAL MEDICINE

## 2020-05-31 PROCEDURE — 63710000001 INSULIN DETEMIR PER 5 UNITS: Performed by: INTERNAL MEDICINE

## 2020-05-31 PROCEDURE — 25010000002 CEFTRIAXONE PER 250 MG: Performed by: INTERNAL MEDICINE

## 2020-05-31 PROCEDURE — 99232 SBSQ HOSP IP/OBS MODERATE 35: CPT | Performed by: INTERNAL MEDICINE

## 2020-05-31 PROCEDURE — 25010000002 VANCOMYCIN 1 G RECONSTITUTED SOLUTION: Performed by: INTERNAL MEDICINE

## 2020-05-31 PROCEDURE — 25010000002 ENOXAPARIN PER 10 MG: Performed by: INTERNAL MEDICINE

## 2020-05-31 PROCEDURE — 94799 UNLISTED PULMONARY SVC/PX: CPT

## 2020-05-31 PROCEDURE — 85007 BL SMEAR W/DIFF WBC COUNT: CPT | Performed by: INTERNAL MEDICINE

## 2020-05-31 PROCEDURE — 85025 COMPLETE CBC W/AUTO DIFF WBC: CPT | Performed by: INTERNAL MEDICINE

## 2020-05-31 RX ORDER — DOCUSATE SODIUM 100 MG/1
100 CAPSULE, LIQUID FILLED ORAL 2 TIMES DAILY
Status: DISCONTINUED | OUTPATIENT
Start: 2020-05-31 | End: 2020-06-15 | Stop reason: HOSPADM

## 2020-05-31 RX ORDER — AMOXICILLIN 250 MG
1 CAPSULE ORAL 2 TIMES DAILY
Status: DISCONTINUED | OUTPATIENT
Start: 2020-05-31 | End: 2020-06-15 | Stop reason: HOSPADM

## 2020-05-31 RX ORDER — BISACODYL 5 MG/1
10 TABLET, DELAYED RELEASE ORAL ONCE
Status: COMPLETED | OUTPATIENT
Start: 2020-05-31 | End: 2020-05-31

## 2020-05-31 RX ORDER — SODIUM POLYSTYRENE SULFONATE 4.1 MEQ/G
15 POWDER, FOR SUSPENSION ORAL; RECTAL EVERY 12 HOURS SCHEDULED
Status: DISCONTINUED | OUTPATIENT
Start: 2020-05-31 | End: 2020-06-02

## 2020-05-31 RX ADMIN — BUPRENORPHINE HYDROCHLORIDE AND NALOXONE HYDROCHLORIDE 1.25 TABLET: 8; 2 TABLET SUBLINGUAL at 08:15

## 2020-05-31 RX ADMIN — BISACODYL 10 MG: 5 TABLET, COATED ORAL at 12:35

## 2020-05-31 RX ADMIN — LACTULOSE 30 G: 10 SOLUTION ORAL at 15:22

## 2020-05-31 RX ADMIN — SODIUM CHLORIDE, PRESERVATIVE FREE 10 ML: 5 INJECTION INTRAVENOUS at 21:15

## 2020-05-31 RX ADMIN — SODIUM CHLORIDE, PRESERVATIVE FREE 10 ML: 5 INJECTION INTRAVENOUS at 08:13

## 2020-05-31 RX ADMIN — INSULIN DETEMIR 45 UNITS: 100 INJECTION, SOLUTION SUBCUTANEOUS at 22:38

## 2020-05-31 RX ADMIN — Medication 1 CAPSULE: at 08:10

## 2020-05-31 RX ADMIN — DOCUSATE SODIUM 100 MG: 100 CAPSULE, LIQUID FILLED ORAL at 21:12

## 2020-05-31 RX ADMIN — HYDRALAZINE HYDROCHLORIDE 10 MG: 20 INJECTION INTRAMUSCULAR; INTRAVENOUS at 02:48

## 2020-05-31 RX ADMIN — METHYLNALTREXONE BROMIDE 6 MG: 12 INJECTION, SOLUTION SUBCUTANEOUS at 15:23

## 2020-05-31 RX ADMIN — INSULIN DETEMIR 45 UNITS: 100 INJECTION, SOLUTION SUBCUTANEOUS at 08:52

## 2020-05-31 RX ADMIN — SODIUM POLYSTYRENE SULFONATE 15 G: 1 POWDER ORAL; RECTAL at 11:50

## 2020-05-31 RX ADMIN — VANCOMYCIN HYDROCHLORIDE 1000 MG: 1 INJECTION, POWDER, LYOPHILIZED, FOR SOLUTION INTRAVENOUS at 11:51

## 2020-05-31 RX ADMIN — TIZANIDINE 4 MG: 4 TABLET ORAL at 17:32

## 2020-05-31 RX ADMIN — FOLIC ACID 1 MG: 1 TABLET ORAL at 08:10

## 2020-05-31 RX ADMIN — POLYETHYLENE GLYCOL (3350) 17 G: 17 POWDER, FOR SOLUTION ORAL at 15:22

## 2020-05-31 RX ADMIN — CARVEDILOL 6.25 MG: 6.25 TABLET, FILM COATED ORAL at 08:10

## 2020-05-31 RX ADMIN — ROPINIROLE HYDROCHLORIDE 0.25 MG: 0.25 TABLET, FILM COATED ORAL at 21:12

## 2020-05-31 RX ADMIN — TIZANIDINE 4 MG: 4 TABLET ORAL at 05:38

## 2020-05-31 RX ADMIN — CLONIDINE HYDROCHLORIDE 0.2 MG: 0.2 TABLET ORAL at 08:10

## 2020-05-31 RX ADMIN — LACTULOSE 30 G: 10 SOLUTION ORAL at 08:11

## 2020-05-31 RX ADMIN — CEFTRIAXONE 2 G: 2 INJECTION, POWDER, FOR SOLUTION INTRAMUSCULAR; INTRAVENOUS at 06:24

## 2020-05-31 RX ADMIN — CARVEDILOL 6.25 MG: 6.25 TABLET, FILM COATED ORAL at 17:31

## 2020-05-31 RX ADMIN — BACITRACIN: 500 OINTMENT TOPICAL at 21:12

## 2020-05-31 RX ADMIN — ENOXAPARIN SODIUM 40 MG: 40 INJECTION SUBCUTANEOUS at 08:13

## 2020-05-31 RX ADMIN — SODIUM CHLORIDE, PRESERVATIVE FREE 10 ML: 5 INJECTION INTRAVENOUS at 08:12

## 2020-05-31 RX ADMIN — INSULIN ASPART 2 UNITS: 100 INJECTION, SOLUTION INTRAVENOUS; SUBCUTANEOUS at 06:30

## 2020-05-31 RX ADMIN — CLONIDINE HYDROCHLORIDE 0.2 MG: 0.2 TABLET ORAL at 21:12

## 2020-05-31 RX ADMIN — DOCUSATE SODIUM 50 MG AND SENNOSIDES 8.6 MG 1 TABLET: 8.6; 5 TABLET, FILM COATED ORAL at 12:35

## 2020-05-31 RX ADMIN — SODIUM CHLORIDE, PRESERVATIVE FREE 10 ML: 5 INJECTION INTRAVENOUS at 08:14

## 2020-05-31 RX ADMIN — BACITRACIN: 500 OINTMENT TOPICAL at 08:13

## 2020-05-31 RX ADMIN — LACTULOSE 30 G: 10 SOLUTION ORAL at 21:13

## 2020-06-01 ENCOUNTER — HOSPITAL ENCOUNTER (OUTPATIENT)
Dept: INFUSION THERAPY | Facility: HOSPITAL | Age: 40
End: 2020-06-01

## 2020-06-01 LAB
ALBUMIN SERPL-MCNC: 2.16 G/DL (ref 3.5–5.2)
ALBUMIN/GLOB SERPL: 0.4 G/DL
ALP SERPL-CCNC: 142 U/L (ref 39–117)
ALT SERPL W P-5'-P-CCNC: 19 U/L (ref 1–33)
ANION GAP SERPL CALCULATED.3IONS-SCNC: 7.2 MMOL/L (ref 5–15)
AST SERPL-CCNC: 28 U/L (ref 1–32)
BASOPHILS # BLD AUTO: 0.08 10*3/MM3 (ref 0–0.2)
BASOPHILS NFR BLD AUTO: 1 % (ref 0–1.5)
BILIRUB SERPL-MCNC: <0.2 MG/DL (ref 0.2–1.2)
BUN BLD-MCNC: 41 MG/DL (ref 6–20)
BUN/CREAT SERPL: 33.3 (ref 7–25)
CALCIUM SPEC-SCNC: 8.2 MG/DL (ref 8.6–10.5)
CHLORIDE SERPL-SCNC: 101 MMOL/L (ref 98–107)
CO2 SERPL-SCNC: 20.8 MMOL/L (ref 22–29)
CORTIS SERPL-MCNC: 8.01 MCG/DL
CREAT BLD-MCNC: 1.23 MG/DL (ref 0.57–1)
DEPRECATED RDW RBC AUTO: 62.7 FL (ref 37–54)
EOSINOPHIL # BLD AUTO: 0.6 10*3/MM3 (ref 0–0.4)
EOSINOPHIL NFR BLD AUTO: 7.6 % (ref 0.3–6.2)
ERYTHROCYTE [DISTWIDTH] IN BLOOD BY AUTOMATED COUNT: 20.4 % (ref 12.3–15.4)
GFR SERPL CREATININE-BSD FRML MDRD: 48 ML/MIN/1.73
GLOBULIN UR ELPH-MCNC: 4.8 GM/DL
GLUCOSE BLD-MCNC: 104 MG/DL (ref 65–99)
GLUCOSE BLDC GLUCOMTR-MCNC: 133 MG/DL (ref 70–130)
GLUCOSE BLDC GLUCOMTR-MCNC: 146 MG/DL (ref 70–130)
GLUCOSE BLDC GLUCOMTR-MCNC: 181 MG/DL (ref 70–130)
GLUCOSE BLDC GLUCOMTR-MCNC: 79 MG/DL (ref 70–130)
HCT VFR BLD AUTO: 24.5 % (ref 34–46.6)
HGB BLD-MCNC: 7.1 G/DL (ref 12–15.9)
IMM GRANULOCYTES # BLD AUTO: 0.03 10*3/MM3 (ref 0–0.05)
IMM GRANULOCYTES NFR BLD AUTO: 0.4 % (ref 0–0.5)
LYMPHOCYTES # BLD AUTO: 3.2 10*3/MM3 (ref 0.7–3.1)
LYMPHOCYTES NFR BLD AUTO: 40.4 % (ref 19.6–45.3)
MCH RBC QN AUTO: 25 PG (ref 26.6–33)
MCHC RBC AUTO-ENTMCNC: 29 G/DL (ref 31.5–35.7)
MCV RBC AUTO: 86.3 FL (ref 79–97)
MONOCYTES # BLD AUTO: 0.76 10*3/MM3 (ref 0.1–0.9)
MONOCYTES NFR BLD AUTO: 9.6 % (ref 5–12)
NEUTROPHILS # BLD AUTO: 3.26 10*3/MM3 (ref 1.7–7)
NEUTROPHILS NFR BLD AUTO: 41 % (ref 42.7–76)
NRBC BLD AUTO-RTO: 0 /100 WBC (ref 0–0.2)
PLATELET # BLD AUTO: 277 10*3/MM3 (ref 140–450)
PMV BLD AUTO: 10.4 FL (ref 6–12)
POTASSIUM BLD-SCNC: 6 MMOL/L (ref 3.5–5.2)
POTASSIUM UR-SCNC: 31.7 MMOL/L
PROT SERPL-MCNC: 7 G/DL (ref 6–8.5)
RBC # BLD AUTO: 2.84 10*6/MM3 (ref 3.77–5.28)
SODIUM BLD-SCNC: 129 MMOL/L (ref 136–145)
VANCOMYCIN SERPL-MCNC: 24.1 MCG/ML (ref 5–40)
WBC NRBC COR # BLD: 7.93 10*3/MM3 (ref 3.4–10.8)

## 2020-06-01 PROCEDURE — 84133 ASSAY OF URINE POTASSIUM: CPT | Performed by: INTERNAL MEDICINE

## 2020-06-01 PROCEDURE — 25010000002 ENOXAPARIN PER 10 MG: Performed by: INTERNAL MEDICINE

## 2020-06-01 PROCEDURE — 84244 ASSAY OF RENIN: CPT | Performed by: INTERNAL MEDICINE

## 2020-06-01 PROCEDURE — 82533 TOTAL CORTISOL: CPT | Performed by: INTERNAL MEDICINE

## 2020-06-01 PROCEDURE — 85025 COMPLETE CBC W/AUTO DIFF WBC: CPT | Performed by: INTERNAL MEDICINE

## 2020-06-01 PROCEDURE — 63710000001 INSULIN ASPART PER 5 UNITS: Performed by: INTERNAL MEDICINE

## 2020-06-01 PROCEDURE — 82962 GLUCOSE BLOOD TEST: CPT

## 2020-06-01 PROCEDURE — 80053 COMPREHEN METABOLIC PANEL: CPT | Performed by: INTERNAL MEDICINE

## 2020-06-01 PROCEDURE — 25010000002 ONDANSETRON PER 1 MG: Performed by: INTERNAL MEDICINE

## 2020-06-01 PROCEDURE — 94799 UNLISTED PULMONARY SVC/PX: CPT

## 2020-06-01 PROCEDURE — 99233 SBSQ HOSP IP/OBS HIGH 50: CPT | Performed by: INTERNAL MEDICINE

## 2020-06-01 PROCEDURE — 63710000001 DIPHENHYDRAMINE PER 50 MG: Performed by: INTERNAL MEDICINE

## 2020-06-01 PROCEDURE — 63710000001 INSULIN DETEMIR PER 5 UNITS: Performed by: INTERNAL MEDICINE

## 2020-06-01 PROCEDURE — 80202 ASSAY OF VANCOMYCIN: CPT | Performed by: INTERNAL MEDICINE

## 2020-06-01 PROCEDURE — 82088 ASSAY OF ALDOSTERONE: CPT | Performed by: INTERNAL MEDICINE

## 2020-06-01 PROCEDURE — 25010000002 CEFTRIAXONE PER 250 MG: Performed by: INTERNAL MEDICINE

## 2020-06-01 RX ORDER — CHOLECALCIFEROL (VITAMIN D3) 125 MCG
10 CAPSULE ORAL NIGHTLY
Status: DISCONTINUED | OUTPATIENT
Start: 2020-06-01 | End: 2020-06-15 | Stop reason: HOSPADM

## 2020-06-01 RX ORDER — DIPHENHYDRAMINE HCL 25 MG
25 CAPSULE ORAL EVERY 6 HOURS PRN
Status: DISCONTINUED | OUTPATIENT
Start: 2020-06-01 | End: 2020-06-15 | Stop reason: HOSPADM

## 2020-06-01 RX ADMIN — ONDANSETRON 4 MG: 2 INJECTION INTRAMUSCULAR; INTRAVENOUS at 11:26

## 2020-06-01 RX ADMIN — SODIUM CHLORIDE, PRESERVATIVE FREE 10 ML: 5 INJECTION INTRAVENOUS at 20:15

## 2020-06-01 RX ADMIN — CEFTRIAXONE 2 G: 2 INJECTION, POWDER, FOR SOLUTION INTRAMUSCULAR; INTRAVENOUS at 05:41

## 2020-06-01 RX ADMIN — Medication 10 MG: at 20:16

## 2020-06-01 RX ADMIN — LACTULOSE 30 G: 10 SOLUTION ORAL at 16:40

## 2020-06-01 RX ADMIN — ENOXAPARIN SODIUM 40 MG: 40 INJECTION SUBCUTANEOUS at 08:42

## 2020-06-01 RX ADMIN — BACITRACIN: 500 OINTMENT TOPICAL at 20:15

## 2020-06-01 RX ADMIN — ONDANSETRON 4 MG: 2 INJECTION INTRAMUSCULAR; INTRAVENOUS at 20:31

## 2020-06-01 RX ADMIN — LACTULOSE 30 G: 10 SOLUTION ORAL at 08:44

## 2020-06-01 RX ADMIN — LACTULOSE 30 G: 10 SOLUTION ORAL at 20:15

## 2020-06-01 RX ADMIN — INSULIN ASPART 2 UNITS: 100 INJECTION, SOLUTION INTRAVENOUS; SUBCUTANEOUS at 16:40

## 2020-06-01 RX ADMIN — SODIUM POLYSTYRENE SULFONATE 15 G: 1 POWDER ORAL; RECTAL at 08:47

## 2020-06-01 RX ADMIN — TIZANIDINE 4 MG: 4 TABLET ORAL at 02:50

## 2020-06-01 RX ADMIN — SODIUM CHLORIDE 1000 ML: 9 INJECTION, SOLUTION INTRAVENOUS at 20:12

## 2020-06-01 RX ADMIN — Medication 1 CAPSULE: at 08:42

## 2020-06-01 RX ADMIN — CLONIDINE HYDROCHLORIDE 0.2 MG: 0.2 TABLET ORAL at 08:46

## 2020-06-01 RX ADMIN — SODIUM CHLORIDE, PRESERVATIVE FREE 10 ML: 5 INJECTION INTRAVENOUS at 20:18

## 2020-06-01 RX ADMIN — CARVEDILOL 6.25 MG: 6.25 TABLET, FILM COATED ORAL at 08:42

## 2020-06-01 RX ADMIN — ROPINIROLE HYDROCHLORIDE 0.25 MG: 0.25 TABLET, FILM COATED ORAL at 20:16

## 2020-06-01 RX ADMIN — TIZANIDINE 4 MG: 4 TABLET ORAL at 16:44

## 2020-06-01 RX ADMIN — INSULIN DETEMIR 45 UNITS: 100 INJECTION, SOLUTION SUBCUTANEOUS at 09:07

## 2020-06-01 RX ADMIN — DIPHENHYDRAMINE HYDROCHLORIDE 25 MG: 25 CAPSULE ORAL at 16:44

## 2020-06-01 RX ADMIN — BUPRENORPHINE HYDROCHLORIDE AND NALOXONE HYDROCHLORIDE 1.25 TABLET: 8; 2 TABLET SUBLINGUAL at 08:43

## 2020-06-01 RX ADMIN — BACITRACIN: 500 OINTMENT TOPICAL at 08:43

## 2020-06-01 NOTE — PLAN OF CARE
Patient complaints of anxiety and itching. Refused several medication. Education provided on medication yet patient still refused.

## 2020-06-01 NOTE — PROGRESS NOTES
Nephrology Progress Note      Subjective     Patient feels fine, had small bowel movement yesterday.     Objective       Vital signs :     Temp:  [97.6 °F (36.4 °C)-98.1 °F (36.7 °C)] 98.1 °F (36.7 °C)  Heart Rate:  [74-79] 77  Resp:  [14-18] 18  BP: (114-125)/(71-79) 125/76      Intake/Output Summary (Last 24 hours) at 6/1/2020 0908  Last data filed at 6/1/2020 0729  Gross per 24 hour   Intake 1050.17 ml   Output 2025 ml   Net -974.83 ml       Physical Exam:    General Appearance : not in acute distress  Lungs : clear to auscultation, respirations regular  Heart :  regular rhythm & normal rate, normal S1, S2 and no murmur, no rub  Abdomen : normal bowel sounds, no masses, no hepatomegaly, no splenomegaly, soft non-tender and no guarding  Extremities : moves extremities well, no edema, no cyanosis and no redness  Skin :  no bleeding, bruising or rash  Neurologic :   orientated to person, place, time and situation, Grossly no focal deficits      Laboratory Data :     Albumin Albumin   Date Value Ref Range Status   06/01/2020 2.16 (L) 3.50 - 5.20 g/dL Final   05/31/2020 2.20 (L) 3.50 - 5.20 g/dL Final      Magnesium No results found for: MG       PTH               No results found for: PTH    CBC and coagulation:  Results from last 7 days   Lab Units 06/01/20  0715 05/31/20  0049 05/30/20  0037  05/28/20  0044  05/26/20  0451 05/25/20  1107   LACTATE mmol/L  --   --   --   --   --   --   --  1.8   CRP mg/dL  --   --   --   --  0.17  --  0.35 0.28   WBC 10*3/mm3 7.93 6.39 6.71   < > 5.53   < > 5.76  --    HEMOGLOBIN g/dL 7.1* 7.4* 7.4*   < > 7.3*   < > 7.6*  --    HEMATOCRIT % 24.5* 26.3* 25.5*   < > 25.4*   < > 26.6*  --    MCV fL 86.3 87.7 85.3   < > 85.5   < > 82.9  --    MCHC g/dL 29.0* 28.1* 29.0*   < > 28.7*   < > 28.6*  --    PLATELETS 10*3/mm3 277 270 271   < > 245   < > 274  --     < > = values in this interval not displayed.     Acid/base balance:      Renal and electrolytes:  Results from last 7 days   Lab  Units 06/01/20 0715 05/31/20  0049 05/30/20  1709  05/30/20  0037  05/29/20  0036  05/28/20  1154  05/27/20  0245 05/26/20  0451   SODIUM mmol/L 129* 133*  --   --  133*  --  132*  --  133*   < > 132* 131*   POTASSIUM mmol/L 6.0* 6.0* 5.8*   < > 5.8*   < > 5.6*   < > 5.7*   < > 5.2 4.8   MAGNESIUM mg/dL  --   --   --   --   --   --   --   --   --   --  2.3 1.6   CHLORIDE mmol/L 101 103  --   --  104  --  105  --  107   < > 102 98   CO2 mmol/L 20.8* 22.5  --   --  20.8*  --  19.3*  --  19.8*   < > 20.9* 22.2   BUN mg/dL 41* 34*  --   --  29*  --  28*  --  29*   < > 23* 23*   CREATININE mg/dL 1.23* 1.18*  --   --  1.09*  --  1.27*  --  1.27*   < > 1.31* 1.14*   EGFR IF NONAFRICN AM mL/min/1.73 48* 50*  --   --  55*  --  46*  --  46*   < > 45* 53*   CALCIUM mg/dL 8.2* 8.2*  --   --  8.4*  --  7.7*  --  8.0*   < > 8.3* 8.4*   PHOSPHORUS mg/dL  --   --   --   --   --   --   --   --   --   --   --  3.9    < > = values in this interval not displayed.     Estimated Creatinine Clearance: 68.9 mL/min (A) (by C-G formula based on SCr of 1.23 mg/dL (H)).    Liver and pancreatic function:  Results from last 7 days   Lab Units 06/01/20 0715 05/31/20  0049 05/29/20  0036   ALBUMIN g/dL 2.16* 2.20* 2.14*   BILIRUBIN mg/dL <0.2* <0.2* <0.2*   ALK PHOS U/L 142* 147* 146*   AST (SGOT) U/L 28 30 31   ALT (SGPT) U/L 19 19 19         Cardiac:      Liver and pancreatic function:  Results from last 7 days   Lab Units 06/01/20  0715 05/31/20  0049 05/29/20  0036   ALBUMIN g/dL 2.16* 2.20* 2.14*   BILIRUBIN mg/dL <0.2* <0.2* <0.2*   ALK PHOS U/L 142* 147* 146*   AST (SGOT) U/L 28 30 31   ALT (SGPT) U/L 19 19 19       Medications :       bacitracin  Topical Q12H   buprenorphine-naloxone 1.25 tablet Sublingual Daily   carvedilol 6.25 mg Oral BID With Meals   cholecalciferol 50,000 Units Oral Q7 Days   cloNIDine 0.2 mg Oral Q12H   docusate sodium 100 mg Oral BID   enoxaparin 40 mg Subcutaneous Q24H   folic acid 1 mg Oral Daily   insulin  aspart 0-9 Units Subcutaneous TID AC   insulin detemir 45 Units Subcutaneous BID   lactobacillus acidophilus 1 capsule Oral Daily   lactulose 30 g Oral TID   polyethylene glycol 17 g Oral Daily   rOPINIRole 0.25 mg Oral Nightly   senna-docusate sodium 1 tablet Oral BID   sodium chloride 10 mL Intravenous Q12H   sodium chloride 10 mL Intravenous Q12H   sodium chloride 10 mL Intravenous Q12H   sodium polystyrene 15 g Oral Q12H   Vancomycin Pharmacy Intermittent Dosing  Does not apply Daily            Assessment/Plan     1. Persistent Hyperkalemia  2. Sever constipation  3. SARINA  4. DM-II  5. Essential hypertension    Persistent hyperkalemia now more concerning etiology RTA, will get work up today. Will continue bowel regemine and kayexalate as well.     Hyperkalemia likely 2/2 sever constipation, less likely from RTA.      Baseline Cr <1, admitted with 1.3, improved to 1.1  -cotinue on docusate, senna  -kayexylate bid  -send cortisol, aldosterone,/renin  -U lytes.         Jonnathan Kinney MD  06/01/20  09:08

## 2020-06-01 NOTE — PROGRESS NOTES
Nutrition Services    Patient Name:  Angelique Mcdermott  YOB: 1980  MRN: [id number]  Admit Date:  5/25/2020    Pt reports not eating magic cup, will D/c    Pt reports being very hungry - discussed increasing non-starchy vegetables and protein at meals to increase satiety. Discussed w/ pt non-starchy vegetables vs starchy regarding blood glucose control. Pt had questions about potassium foods, d/w pt what foods are higher in potassium, provided HO for references.     RD to cont to follow.       Electronically signed by:  Bethany Javed RD  06/01/20 13:52

## 2020-06-01 NOTE — PROGRESS NOTES
Random vancomycin level obtained this AM was reported at 24.1 mg/L.  Supplemental dose not needed today. Today is last day of Vancomycin.  Pharmacy will continue to follow.  Thank you.

## 2020-06-01 NOTE — PLAN OF CARE
Pt requesting anxiety medication. Pt verbalizes being discouraged with diet. Refused kayexalate. Pt educated and still refused. In no acute distress. Will continue to monitor and follow plan of care.

## 2020-06-01 NOTE — PROGRESS NOTES
[facility] HOSPITALIST PROGRESS NOTE     Patient Identification:  Name:  Angelique Mcdermott  Age:  39 y.o.  Sex:  female  :  1980  MRN:  [id number]  Visit Number:  [id number]  ROOM: [address] [place] [address]     Primary Care Provider:  Provider, No Known    Length of stay in inpatient status:  7    Subjective     Chief Compliant:    Chief Complaint   Patient presents with   • Abdominal Pain       History of Presenting Illness:    Patient reported being bored while in the hospital. Reports feeling overall better but is anxious at times. She also reported generalized itching.     ROS:  Denies fevers/chills. Denies chest pain.   Otherwise 10 point ROS negative other than documented above in HPI.     Objective     Current Hospital Meds:  bacitracin  Topical Q12H   buprenorphine-naloxone 1.25 tablet Sublingual Daily   carvedilol 6.25 mg Oral BID With Meals   cholecalciferol 50,000 Units Oral Q7 Days   cloNIDine 0.2 mg Oral Q12H   docusate sodium 100 mg Oral BID   enoxaparin 40 mg Subcutaneous Q24H   folic acid 1 mg Oral Daily   insulin aspart 0-9 Units Subcutaneous TID AC   insulin detemir 45 Units Subcutaneous BID   lactulose 30 g Oral TID   melatonin 10 mg Oral Nightly   polyethylene glycol 17 g Oral Daily   rOPINIRole 0.25 mg Oral Nightly   senna-docusate sodium 1 tablet Oral BID   sodium chloride 1,000 mL Intravenous Once   sodium chloride 10 mL Intravenous Q12H   sodium chloride 10 mL Intravenous Q12H   sodium chloride 10 mL Intravenous Q12H   sodium polystyrene 15 g Oral Q12H   Vancomycin Pharmacy Intermittent Dosing  Does not apply Daily        Current Antimicrobial Therapy:  Anti-Infectives (From admission, onward)    Ordered     Dose/Rate Route Frequency Start Stop    200  Vancomycin Pharmacy Intermittent Dosing  Review   Ordering Provider:  Jeremiah Medina DO     Does not apply Daily 20 0900 20 0859    20 0925  vancomycin 1 g/250 mL 0.9% NS (vial-mate)     Ordering  Provider:  Jeremiah Medina DO    1,000 mg  over 60 Minutes Intravenous Once 05/31/20 1100 05/31/20 1251    05/29/20 0741  vancomycin 1 g/250 mL 0.9% NS (vial-mate)     Ordering Provider:  Jeremiah Medina DO    1,000 mg  over 60 Minutes Intravenous Once 05/29/20 0900 05/29/20 1010    05/25/20 0828  cefTRIAXone (ROCEPHIN) 2 g/100 mL 0.9% NS VTB (KARL)     Ordering Provider:  Jeremiah Medina DO    2 g  over 30 Minutes Intravenous Every 24 Hours 05/26/20 0700 06/01/20 0611    05/25/20 0603  cefTRIAXone (ROCEPHIN) 2 g/100 mL 0.9% NS VTB (KARL)     Ordering Provider:  Sharon Romero PA    2 g  over 30 Minutes Intravenous Once 05/25/20 0605 05/25/20 0739        Current Diuretic Therapy:  Diuretics (From admission, onward)    None        ----------------------------------------------------------------------------------------------------------------------  Vital Signs:  Temp:  [97.6 °F (36.4 °C)-98.1 °F (36.7 °C)] 98.1 °F (36.7 °C)  Heart Rate:  [75-79] 77  Resp:  [16-18] 18  BP: ()/(52-78) 90/53  SpO2:  [91 %-100 %] 91 %  on   ;   Device (Oxygen Therapy): room air  Body mass index is 25.03 kg/m².    Wt Readings from Last 3 Encounters:   06/01/20 72.5 kg (159 lb 12.8 oz)   10/16/18 59 kg (130 lb)     Intake & Output (last 3 days)       05/30 0701 - 05/31 0700 05/31 0701 - 06/01 0700 06/01 0701 - 06/02 0700    P.O. 4157 1660 960    I.V. (mL/kg) 250 (4.3) 100.2 (1.4)     IV Piggyback 100      Total Intake(mL/kg) 4507 (78.1) 1760.2 (24.3) 960 (13.2)    Urine (mL/kg/hr) 1050 (0.8) 2290 (1.3) 675 (0.8)    Stool 0      Total Output 1050 2290 675    Net +3457 -529.8 +285           Urine Unmeasured Occurrence 7 x 1 x     Stool Unmeasured Occurrence 7 x          Diet Soft Texture; Chopped; Consistent Carbohydrate, Low Potassium  ----------------------------------------------------------------------------------------------------------------------  Physical exam:  Constitutional:  Well-developed and  well-nourished.  No respiratory distress.      HENT:  Head:  Normocephalic and atraumatic.  Mouth:  Moist mucous membranes.    Eyes:  Conjunctivae and EOM are normal. No scleral icterus.    Neck:  Neck supple.  No JVD present.    Cardiovascular:  Normal rate, regular rhythm and normal heart sounds with no murmur.  Pulmonary/Chest:  No respiratory distress, no wheezes, no crackles, with normal breath sounds and good air movement.  Abdominal:  Soft.  Bowel sounds are normal.  No distension and no tenderness.   Musculoskeletal:  L AKA, L elbow swelling and warmth. Some Passive and active ROM able to be obtained but painful per patient.   Neurological:  Alert and oriented to person, place, and time.  No cranial nerve deficit.  No tongue deviation.  No facial droop.  No slurred speech.   Skin:  Skin is warm and dry. No rash noted. No pallor.   Peripheral vascular:  Pulses in all 4 extremities with no clubbing, no cyanosis, no edema.  ----------------------------------------------------------------------------------------------------------------------  Tele:    ----------------------------------------------------------------------------------------------------------------------  Results from last 7 days   Lab Units 06/01/20  0715 05/31/20  0049 05/30/20  0037  05/28/20  0044  05/26/20  0451   CRP mg/dL  --   --   --   --  0.17  --  0.35   WBC 10*3/mm3 7.93 6.39 6.71   < > 5.53   < > 5.76   HEMOGLOBIN g/dL 7.1* 7.4* 7.4*   < > 7.3*   < > 7.6*   HEMATOCRIT % 24.5* 26.3* 25.5*   < > 25.4*   < > 26.6*   MCV fL 86.3 87.7 85.3   < > 85.5   < > 82.9   MCHC g/dL 29.0* 28.1* 29.0*   < > 28.7*   < > 28.6*   PLATELETS 10*3/mm3 277 270 271   < > 245   < > 274    < > = values in this interval not displayed.         Results from last 7 days   Lab Units 06/01/20  0715 05/31/20  0049 05/30/20  1709  05/30/20  0037  05/29/20  0036  05/27/20  0245 05/26/20  0451   SODIUM mmol/L 129* 133*  --   --  133*  --  132*   < > 132* 131*   POTASSIUM  mmol/L 6.0* 6.0* 5.8*   < > 5.8*   < > 5.6*   < > 5.2 4.8   MAGNESIUM mg/dL  --   --   --   --   --   --   --   --  2.3 1.6   CHLORIDE mmol/L 101 103  --   --  104  --  105   < > 102 98   CO2 mmol/L 20.8* 22.5  --   --  20.8*  --  19.3*   < > 20.9* 22.2   BUN mg/dL 41* 34*  --   --  29*  --  28*   < > 23* 23*   CREATININE mg/dL 1.23* 1.18*  --   --  1.09*  --  1.27*   < > 1.31* 1.14*   EGFR IF NONAFRICN AM mL/min/1.73 48* 50*  --   --  55*  --  46*   < > 45* 53*   CALCIUM mg/dL 8.2* 8.2*  --   --  8.4*  --  7.7*   < > 8.3* 8.4*   PHOSPHORUS mg/dL  --   --   --   --   --   --   --   --   --  3.9   GLUCOSE mg/dL 104* 206*  --   --  79  --  252*   < > 141* 292*   ALBUMIN g/dL 2.16* 2.20*  --   --   --   --  2.14*   < > 2.47* 2.52*   BILIRUBIN mg/dL <0.2* <0.2*  --   --   --   --  <0.2*   < > <0.2* 0.2   ALK PHOS U/L 142* 147*  --   --   --   --  146*   < > 158* 162*   AST (SGOT) U/L 28 30  --   --   --   --  31   < > 28 28   ALT (SGPT) U/L 19 19  --   --   --   --  19   < > 17 20    < > = values in this interval not displayed.   Estimated Creatinine Clearance: 68.9 mL/min (A) (by C-G formula based on SCr of 1.23 mg/dL (H)).  No results found for: AMMONIA  Results from last 7 days   Lab Units 05/26/20  0451 05/25/20 2041   TROPONIN T ng/mL 0.028 0.022             Glucose   Date/Time Value Ref Range Status   06/01/2020 1615 181 (H) 70 - 130 mg/dL Final   06/01/2020 1046 146 (H) 70 - 130 mg/dL Final   06/01/2020 0643 133 (H) 70 - 130 mg/dL Final   05/31/2020 2126 222 (H) 70 - 130 mg/dL Final   05/31/2020 1637 125 70 - 130 mg/dL Final   05/31/2020 1053 123 70 - 130 mg/dL Final   05/31/2020 0538 156 (H) 70 - 130 mg/dL Final   05/30/2020 2125 160 (H) 70 - 130 mg/dL Final     No results found for: TSH, FREET4  No results found for: PREGTESTUR, PREGSERUM, HCG, HCGQUANT  Pain Management Panel     Pain Management Panel Latest Ref Rng & Units 5/26/2020 5/25/2020    CREATININE UR mg/dL 39.3 -    AMPHETAMINES SCREEN, URINE  Negative - Positive(A)    BARBITURATES SCREEN Negative - Negative    BENZODIAZEPINE SCREEN, URINE Negative - Negative    BUPRENORPHINEUR Negative - Positive(A)    COCAINE SCREEN, URINE Negative - Negative    METHADONE SCREEN, URINE Negative - Negative        Brief Urine Lab Results  (Last result in the past 365 days)      Color   Clarity   Blood   Leuk Est   Nitrite   Protein   CREAT   Urine HCG        05/26/20 2338             39.3           No results found for: BLOODCX  No results found for: URINECX  No results found for: WOUNDCX  No results found for: STOOLCX  No results found for: RESPCX  No results found for: AFBCX  Results from last 7 days   Lab Units 05/28/20  0044 05/26/20  0451   CRP mg/dL 0.17 0.35       I have personally looked at the labs and they are summarized above.  ----------------------------------------------------------------------------------------------------------------------  Detailed radiology reports for the last 24 hours:    Imaging Results (Last 24 Hours)     ** No results found for the last 24 hours. **        Assessment & Plan    #SARINA vs. CKD III  #Hyperkalemia   #HAGMA  - Unclear Cr baseline. 0.97 om 10/18 only prior value  - Cr 1.23 this AM. Stable.   - Hyperkalemia persistent even with aggressive bowel regimen.   - Etiology of hyperkalemia being evaluated by nephrology. Appreciate recs. Cortisol, aldosterone, renin, urine potassium levels ordered.     #UTI  - Urine culture revealed normal jefferson. On ceftriaxone as below.      #L elbow effusion w/ concern for bursitis   - Patient declined surgical evaluation. Septic bursitis on differential. Day 7/10 of abx. Continue vancomycin and rocephin. Can deescalate when medically ready for discharge.     #Hyponatremia   - Suspect hypovolemic. BUN also jumped up today. Will order 1L bolus and recheck in AM.     #DM II  - A1C 11.1%, Diabetes educator consulted   - SSI , titrate as needed     #Mild troponin leak  - No acute ischemic changes on  EKG. Normalized on repeat.     #Stable normocytic anemia     #Substance abuse   - UDS positive for amphetamines and buprenorphine. Pt is prescribed Suboxone and admits to recent methamphetamine abuse.  - Plans to continue cessation at discharge. Has psychiatry f/u scheduled for 6/11.     #Cirrhosis w/ hx of hepatitis C  - Appears compensated. Outpatient hepatology f/u     #Constipation   #N/V  - Continue to escalate bowel regimen as able. Patient declining enemas.     #Hypomagnesemia   - Replaced     F: PO  E: Replace as needed   N: CC    Code status: Full     Dispo: Pending clinical improvement. Will get PT/OT to evaluate.       VTE Prophylaxis:   Mechanical Order History:     None      Pharmalogical Order History:     Ordered     Dose Route Frequency Stop    05/25/20 0727  enoxaparin (LOVENOX) syringe 40 mg      40 mg SC Every 24 Hours --    05/25/20 0717  Pharmacy to Dose enoxaparin (LOVENOX)  Status:  Discontinued     Question:  Indication of use  Answer:  Prophylaxis    -- XX Continuous PRN 05/25/20 0738          Chris Pichardo MD  [facility]  06/01/20  19:02

## 2020-06-01 NOTE — PROGRESS NOTES
Discharge Planning Assessment  [facility]     Patient Name: Angelique Mcdermott  MRN: [id number]  Today's Date: 6/1/2020    Admit Date: 5/25/2020        Discharge Plan     Row Name 06/01/20 1623       Plan    Plan  Pt transferred to [address] [place] [address].  Pt will need appt with [facility] infusion clinic for orbactin and [facility] arranged for transport at [phone].  SS will follow.        NADINE Maldonado

## 2020-06-02 LAB
ANION GAP SERPL CALCULATED.3IONS-SCNC: 6 MMOL/L (ref 5–15)
ANISOCYTOSIS BLD QL: NORMAL
BASOPHILS # BLD AUTO: 0.08 10*3/MM3 (ref 0–0.2)
BASOPHILS NFR BLD AUTO: 1.1 % (ref 0–1.5)
BUN BLD-MCNC: 45 MG/DL (ref 6–20)
BUN/CREAT SERPL: 36 (ref 7–25)
CALCIUM SPEC-SCNC: 7.9 MG/DL (ref 8.6–10.5)
CHLORIDE SERPL-SCNC: 103 MMOL/L (ref 98–107)
CHLORIDE UR-SCNC: 61 MMOL/L
CO2 SERPL-SCNC: 20 MMOL/L (ref 22–29)
CREAT BLD-MCNC: 1.25 MG/DL (ref 0.57–1)
DEPRECATED RDW RBC AUTO: 62.7 FL (ref 37–54)
EOSINOPHIL # BLD AUTO: 0.44 10*3/MM3 (ref 0–0.4)
EOSINOPHIL NFR BLD AUTO: 6 % (ref 0.3–6.2)
ERYTHROCYTE [DISTWIDTH] IN BLOOD BY AUTOMATED COUNT: 20 % (ref 12.3–15.4)
GFR SERPL CREATININE-BSD FRML MDRD: 47 ML/MIN/1.73
GLUCOSE BLD-MCNC: 130 MG/DL (ref 65–99)
GLUCOSE BLDC GLUCOMTR-MCNC: 119 MG/DL (ref 70–130)
GLUCOSE BLDC GLUCOMTR-MCNC: 76 MG/DL (ref 70–130)
GLUCOSE BLDC GLUCOMTR-MCNC: 84 MG/DL (ref 70–130)
GLUCOSE BLDC GLUCOMTR-MCNC: 99 MG/DL (ref 70–130)
HCT VFR BLD AUTO: 26 % (ref 34–46.6)
HGB BLD-MCNC: 7.5 G/DL (ref 12–15.9)
HYPOCHROMIA BLD QL: NORMAL
IMM GRANULOCYTES # BLD AUTO: 0.04 10*3/MM3 (ref 0–0.05)
IMM GRANULOCYTES NFR BLD AUTO: 0.5 % (ref 0–0.5)
LYMPHOCYTES # BLD AUTO: 3.06 10*3/MM3 (ref 0.7–3.1)
LYMPHOCYTES NFR BLD AUTO: 41.7 % (ref 19.6–45.3)
MAGNESIUM SERPL-MCNC: 1.7 MG/DL (ref 1.6–2.6)
MCH RBC QN AUTO: 24.8 PG (ref 26.6–33)
MCHC RBC AUTO-ENTMCNC: 28.8 G/DL (ref 31.5–35.7)
MCV RBC AUTO: 86.1 FL (ref 79–97)
MONOCYTES # BLD AUTO: 0.71 10*3/MM3 (ref 0.1–0.9)
MONOCYTES NFR BLD AUTO: 9.7 % (ref 5–12)
NEUTROPHILS # BLD AUTO: 3 10*3/MM3 (ref 1.7–7)
NEUTROPHILS NFR BLD AUTO: 41 % (ref 42.7–76)
NRBC BLD AUTO-RTO: 0 /100 WBC (ref 0–0.2)
PLAT MORPH BLD: NORMAL
PLATELET # BLD AUTO: 239 10*3/MM3 (ref 140–450)
PMV BLD AUTO: 10.1 FL (ref 6–12)
POTASSIUM BLD-SCNC: 6 MMOL/L (ref 3.5–5.2)
POTASSIUM BLD-SCNC: 6.2 MMOL/L (ref 3.5–5.2)
POTASSIUM BLD-SCNC: 6.4 MMOL/L (ref 3.5–5.2)
POTASSIUM BLD-SCNC: 6.6 MMOL/L (ref 3.5–5.2)
POTASSIUM BLD-SCNC: 6.6 MMOL/L (ref 3.5–5.2)
POTASSIUM UR-SCNC: 19.3 MMOL/L
RBC # BLD AUTO: 3.02 10*6/MM3 (ref 3.77–5.28)
SODIUM BLD-SCNC: 129 MMOL/L (ref 136–145)
SODIUM UR-SCNC: 75 MMOL/L
WBC NRBC COR # BLD: 7.33 10*3/MM3 (ref 3.4–10.8)

## 2020-06-02 PROCEDURE — 94799 UNLISTED PULMONARY SVC/PX: CPT

## 2020-06-02 PROCEDURE — 83935 ASSAY OF URINE OSMOLALITY: CPT | Performed by: INTERNAL MEDICINE

## 2020-06-02 PROCEDURE — 63710000001 INSULIN REGULAR HUMAN PER 5 UNITS: Performed by: PHYSICIAN ASSISTANT

## 2020-06-02 PROCEDURE — 97166 OT EVAL MOD COMPLEX 45 MIN: CPT

## 2020-06-02 PROCEDURE — 97162 PT EVAL MOD COMPLEX 30 MIN: CPT

## 2020-06-02 PROCEDURE — 85007 BL SMEAR W/DIFF WBC COUNT: CPT | Performed by: INTERNAL MEDICINE

## 2020-06-02 PROCEDURE — 25010000002 PROMETHAZINE PER 50 MG: Performed by: INTERNAL MEDICINE

## 2020-06-02 PROCEDURE — 84300 ASSAY OF URINE SODIUM: CPT | Performed by: INTERNAL MEDICINE

## 2020-06-02 PROCEDURE — 93005 ELECTROCARDIOGRAM TRACING: CPT | Performed by: PHYSICIAN ASSISTANT

## 2020-06-02 PROCEDURE — 63710000001 DIPHENHYDRAMINE PER 50 MG: Performed by: INTERNAL MEDICINE

## 2020-06-02 PROCEDURE — 93010 ELECTROCARDIOGRAM REPORT: CPT | Performed by: SPECIALIST

## 2020-06-02 PROCEDURE — 84133 ASSAY OF URINE POTASSIUM: CPT | Performed by: INTERNAL MEDICINE

## 2020-06-02 PROCEDURE — 84132 ASSAY OF SERUM POTASSIUM: CPT | Performed by: INTERNAL MEDICINE

## 2020-06-02 PROCEDURE — 63710000001 INSULIN DETEMIR PER 5 UNITS: Performed by: INTERNAL MEDICINE

## 2020-06-02 PROCEDURE — 25010000002 CEFTRIAXONE PER 250 MG: Performed by: INTERNAL MEDICINE

## 2020-06-02 PROCEDURE — 85025 COMPLETE CBC W/AUTO DIFF WBC: CPT | Performed by: INTERNAL MEDICINE

## 2020-06-02 PROCEDURE — 25010000002 ONDANSETRON PER 1 MG: Performed by: INTERNAL MEDICINE

## 2020-06-02 PROCEDURE — 83735 ASSAY OF MAGNESIUM: CPT | Performed by: INTERNAL MEDICINE

## 2020-06-02 PROCEDURE — 63710000001 INSULIN REGULAR HUMAN PER 5 UNITS: Performed by: INTERNAL MEDICINE

## 2020-06-02 PROCEDURE — 93005 ELECTROCARDIOGRAM TRACING: CPT | Performed by: INTERNAL MEDICINE

## 2020-06-02 PROCEDURE — 84132 ASSAY OF SERUM POTASSIUM: CPT | Performed by: PHYSICIAN ASSISTANT

## 2020-06-02 PROCEDURE — 80048 BASIC METABOLIC PNL TOTAL CA: CPT | Performed by: INTERNAL MEDICINE

## 2020-06-02 PROCEDURE — 25010000003 MAGNESIUM SULFATE 4 GM/100ML SOLUTION: Performed by: INTERNAL MEDICINE

## 2020-06-02 PROCEDURE — 25010000002 ENOXAPARIN PER 10 MG: Performed by: INTERNAL MEDICINE

## 2020-06-02 PROCEDURE — 82962 GLUCOSE BLOOD TEST: CPT

## 2020-06-02 PROCEDURE — 94640 AIRWAY INHALATION TREATMENT: CPT

## 2020-06-02 PROCEDURE — 82436 ASSAY OF URINE CHLORIDE: CPT | Performed by: INTERNAL MEDICINE

## 2020-06-02 PROCEDURE — 99233 SBSQ HOSP IP/OBS HIGH 50: CPT | Performed by: INTERNAL MEDICINE

## 2020-06-02 RX ORDER — PROMETHAZINE HYDROCHLORIDE 12.5 MG/1
12.5 SUPPOSITORY RECTAL EVERY 6 HOURS PRN
Status: DISCONTINUED | OUTPATIENT
Start: 2020-06-02 | End: 2020-06-15 | Stop reason: HOSPADM

## 2020-06-02 RX ORDER — DEXTROSE MONOHYDRATE 25 G/50ML
50 INJECTION, SOLUTION INTRAVENOUS ONCE
Status: COMPLETED | OUTPATIENT
Start: 2020-06-02 | End: 2020-06-02

## 2020-06-02 RX ORDER — MAGNESIUM SULFATE HEPTAHYDRATE 40 MG/ML
4 INJECTION, SOLUTION INTRAVENOUS ONCE
Status: COMPLETED | OUTPATIENT
Start: 2020-06-02 | End: 2020-06-02

## 2020-06-02 RX ORDER — MAGNESIUM CARB/ALUMINUM HYDROX 105-160MG
150 TABLET,CHEWABLE ORAL ONCE
Status: COMPLETED | OUTPATIENT
Start: 2020-06-02 | End: 2020-06-02

## 2020-06-02 RX ORDER — SODIUM POLYSTYRENE SULFONATE 4.1 MEQ/G
30 POWDER, FOR SUSPENSION ORAL; RECTAL EVERY 12 HOURS SCHEDULED
Status: DISCONTINUED | OUTPATIENT
Start: 2020-06-02 | End: 2020-06-03

## 2020-06-02 RX ORDER — PROMETHAZINE HYDROCHLORIDE 12.5 MG/1
12.5 TABLET ORAL EVERY 6 HOURS PRN
Status: DISCONTINUED | OUTPATIENT
Start: 2020-06-02 | End: 2020-06-15 | Stop reason: HOSPADM

## 2020-06-02 RX ORDER — PROMETHAZINE HYDROCHLORIDE 6.25 MG/5ML
12.5 SYRUP ORAL EVERY 6 HOURS PRN
Status: DISCONTINUED | OUTPATIENT
Start: 2020-06-02 | End: 2020-06-15 | Stop reason: HOSPADM

## 2020-06-02 RX ORDER — QUETIAPINE FUMARATE 25 MG/1
25 TABLET, FILM COATED ORAL ONCE
Status: COMPLETED | OUTPATIENT
Start: 2020-06-02 | End: 2020-06-02

## 2020-06-02 RX ADMIN — BACITRACIN: 500 OINTMENT TOPICAL at 19:56

## 2020-06-02 RX ADMIN — BACITRACIN: 500 OINTMENT TOPICAL at 08:31

## 2020-06-02 RX ADMIN — INSULIN DETEMIR 45 UNITS: 100 INJECTION, SOLUTION SUBCUTANEOUS at 20:02

## 2020-06-02 RX ADMIN — ALBUTEROL SULFATE 10 MG: 2.5 SOLUTION RESPIRATORY (INHALATION) at 10:05

## 2020-06-02 RX ADMIN — LACTULOSE 30 G: 10 SOLUTION ORAL at 15:51

## 2020-06-02 RX ADMIN — PROMETHAZINE HYDROCHLORIDE 12.5 MG: 25 INJECTION INTRAMUSCULAR; INTRAVENOUS at 19:56

## 2020-06-02 RX ADMIN — HUMAN INSULIN 10 UNITS: 100 INJECTION, SOLUTION SUBCUTANEOUS at 19:58

## 2020-06-02 RX ADMIN — DOCUSATE SODIUM 100 MG: 100 CAPSULE, LIQUID FILLED ORAL at 08:30

## 2020-06-02 RX ADMIN — BUPRENORPHINE HYDROCHLORIDE AND NALOXONE HYDROCHLORIDE 1.25 TABLET: 8; 2 TABLET SUBLINGUAL at 08:30

## 2020-06-02 RX ADMIN — TIZANIDINE 4 MG: 4 TABLET ORAL at 20:20

## 2020-06-02 RX ADMIN — TIZANIDINE 4 MG: 4 TABLET ORAL at 03:14

## 2020-06-02 RX ADMIN — INSULIN DETEMIR 45 UNITS: 100 INJECTION, SOLUTION SUBCUTANEOUS at 08:41

## 2020-06-02 RX ADMIN — HUMAN INSULIN 10 UNITS: 100 INJECTION, SOLUTION SUBCUTANEOUS at 08:32

## 2020-06-02 RX ADMIN — DEXTROSE MONOHYDRATE 50 ML: 25 INJECTION, SOLUTION INTRAVENOUS at 03:14

## 2020-06-02 RX ADMIN — SODIUM POLYSTYRENE SULFONATE 30 G: 1 POWDER ORAL; RECTAL at 20:01

## 2020-06-02 RX ADMIN — VANCOMYCIN HYDROCHLORIDE 1000 MG: 1 INJECTION, POWDER, LYOPHILIZED, FOR SOLUTION INTRAVENOUS at 16:30

## 2020-06-02 RX ADMIN — Medication 10 MG: at 19:57

## 2020-06-02 RX ADMIN — DIPHENHYDRAMINE HYDROCHLORIDE 25 MG: 25 CAPSULE ORAL at 08:30

## 2020-06-02 RX ADMIN — ALBUTEROL SULFATE 2.5 MG: 2.5 SOLUTION RESPIRATORY (INHALATION) at 04:15

## 2020-06-02 RX ADMIN — HUMAN INSULIN 10 UNITS: 100 INJECTION, SOLUTION SUBCUTANEOUS at 03:14

## 2020-06-02 RX ADMIN — OFLOXACIN 50000 UNITS: 300 TABLET, COATED ORAL at 16:30

## 2020-06-02 RX ADMIN — CLONIDINE HYDROCHLORIDE 0.2 MG: 0.2 TABLET ORAL at 08:30

## 2020-06-02 RX ADMIN — ONDANSETRON 4 MG: 2 INJECTION INTRAMUSCULAR; INTRAVENOUS at 08:32

## 2020-06-02 RX ADMIN — CITROMA MAGNESIUM CITRATE 150 ML: 1.75 LIQUID ORAL at 08:31

## 2020-06-02 RX ADMIN — QUETIAPINE FUMARATE 25 MG: 25 TABLET, FILM COATED ORAL at 09:41

## 2020-06-02 RX ADMIN — POLYETHYLENE GLYCOL (3350) 17 G: 17 POWDER, FOR SOLUTION ORAL at 08:31

## 2020-06-02 RX ADMIN — SODIUM CHLORIDE, PRESERVATIVE FREE 10 ML: 5 INJECTION INTRAVENOUS at 19:56

## 2020-06-02 RX ADMIN — DOCUSATE SODIUM 50 MG AND SENNOSIDES 8.6 MG 1 TABLET: 8.6; 5 TABLET, FILM COATED ORAL at 08:30

## 2020-06-02 RX ADMIN — ENOXAPARIN SODIUM 40 MG: 40 INJECTION SUBCUTANEOUS at 08:30

## 2020-06-02 RX ADMIN — DOCUSATE SODIUM 50 MG AND SENNOSIDES 8.6 MG 1 TABLET: 8.6; 5 TABLET, FILM COATED ORAL at 19:56

## 2020-06-02 RX ADMIN — LACTULOSE 30 G: 10 SOLUTION ORAL at 08:31

## 2020-06-02 RX ADMIN — MAGNESIUM SULFATE IN WATER 4 G: 40 INJECTION, SOLUTION INTRAVENOUS at 09:42

## 2020-06-02 RX ADMIN — PROMETHAZINE HYDROCHLORIDE 12.5 MG: 25 INJECTION INTRAMUSCULAR; INTRAVENOUS at 09:41

## 2020-06-02 RX ADMIN — DOCUSATE SODIUM 100 MG: 100 CAPSULE, LIQUID FILLED ORAL at 19:57

## 2020-06-02 RX ADMIN — ROPINIROLE HYDROCHLORIDE 0.25 MG: 0.25 TABLET, FILM COATED ORAL at 19:57

## 2020-06-02 RX ADMIN — DEXTROSE MONOHYDRATE 50 ML: 25 INJECTION, SOLUTION INTRAVENOUS at 08:31

## 2020-06-02 RX ADMIN — SODIUM CHLORIDE, PRESERVATIVE FREE 10 ML: 5 INJECTION INTRAVENOUS at 20:02

## 2020-06-02 RX ADMIN — SODIUM POLYSTYRENE SULFONATE 15 G: 1 POWDER ORAL; RECTAL at 08:30

## 2020-06-02 RX ADMIN — ALBUTEROL SULFATE 10 MG: 2.5 SOLUTION RESPIRATORY (INHALATION) at 20:37

## 2020-06-02 RX ADMIN — DEXTROSE MONOHYDRATE 50 ML: 25 INJECTION, SOLUTION INTRAVENOUS at 20:14

## 2020-06-02 RX ADMIN — LACTULOSE 30 G: 10 SOLUTION ORAL at 20:01

## 2020-06-02 RX ADMIN — SODIUM CHLORIDE 2 G: 900 INJECTION INTRAVENOUS at 15:51

## 2020-06-02 RX ADMIN — SODIUM CHLORIDE 500 ML: 9 INJECTION, SOLUTION INTRAVENOUS at 19:35

## 2020-06-02 RX ADMIN — CARVEDILOL 6.25 MG: 6.25 TABLET, FILM COATED ORAL at 08:32

## 2020-06-02 NOTE — PLAN OF CARE
Pt educated and encouraged to follow modified diet. Vital signs stable currently. No s/s acute distress noted.

## 2020-06-02 NOTE — THERAPY EVALUATION
Acute Care - Physical Therapy Initial Evaluation   Lucien     Patient Name: Angelique Mcdermott  : 1980  MRN: [id number]  Today's Date: 2020   Onset of Illness/Injury or Date of Surgery: 20  Date of Referral to PT: 20  Referring Physician: decreased functional mobility      Admit Date: 2020    Visit Dx:     ICD-10-CM ICD-9-CM   1. Type 2 diabetes mellitus with hyperglycemia, with long-term current use of insulin (CMS/HCC) E11.65 250.00    Z79.4 790.29     V58.67   2. Hyponatremia E87.1 276.1   3. Chronic hepatitis C without hepatic coma (CMS/HCC) B18.2 070.54   4. Methamphetamine abuse (CMS/HCC) F15.10 305.70   5. Acute renal failure, unspecified acute renal failure type (CMS/HCC) N17.9 584.9   6. Asymptomatic microscopic hematuria R31.21 599.72   7. Hypertension, unspecified type I10 401.9   8. Olecranon bursa abscess, left M71.022 727.89     Patient Active Problem List   Diagnosis   • Type 2 diabetes mellitus with hyperglycemia, with long-term current use of insulin (CMS/HCC)   • Olecranon bursa abscess, left     Past Medical History:   Diagnosis Date   • Anxiety    • CAD (coronary artery disease)    • CHF (congestive heart failure) (CMS/HCC)    • Chronic pain    • Cirrhosis (CMS/HCC)    • Depression    • Diabetes mellitus (CMS/HCC)    • Hepatitis B    • Hepatitis C    • Hypertension      Past Surgical History:   Procedure Laterality Date   • ABOVE KNEE AMPUTATION     • CHOLECYSTECTOMY     • HERNIA REPAIR     • HYSTERECTOMY          PT ASSESSMENT (last 12 hours)      Physical Therapy Evaluation     Row Name 20 1404          PT Evaluation Time/Intention    Subjective Information  complains of;weakness;fatigue;pain  -CT     Document Type  evaluation  -CT     Mode of Treatment  physical therapy  -CT     Patient Effort  adequate  -CT     Symptoms Noted During/After Treatment  fatigue  -CT     Comment  Pt tolerated evaluation fair with rest breaks provided as needed. Pt has hx of L AKA  and uses a w/c for mobility.  -CT     Row Name 06/02/20 1404          General Information    Patient Profile Reviewed?  yes  -CT     Onset of Illness/Injury or Date of Surgery  05/25/20  -CT     Referring Physician  decreased functional mobility  -CT     Patient Observations  alert;cooperative;agree to therapy  -CT     Prior Level of Function  independent:;min assist:;transfer;w/c or scooter;bed mobility  -CT     Equipment Currently Used at Home  wheelchair  -CT     Existing Precautions/Restrictions  fall  -CT     Equipment Issued to Patient  gait belt  -CT     Risks Reviewed  patient:;LOB;nausea/vomiting;dizziness;increased discomfort;change in vital signs;increased drainage;lines disloged  -CT     Benefits Reviewed  patient:;improve function;increase independence;increase strength;increase balance;decrease pain;increase knowledge;decrease risk of DVT;improve skin integrity  -CT     Barriers to Rehab  medically complex;previous functional deficit  -CT     Row Name 06/02/20 1404          Relationship/Environment    Primary Source of Support/Comfort  friend  -CT     Lives With  friend(s)  -CT     Row Name 06/02/20 1404          Resource/Environmental Concerns    Current Living Arrangements  home/apartment/condo  -CT     Row Name 06/02/20 1404          Cognitive Assessment/Intervention- PT/OT    Orientation Status (Cognition)  oriented x 3  -CT     Follows Commands (Cognition)  WFL  -CT     Row Name 06/02/20 1404          Safety Issues, Functional Mobility    Impairments Affecting Function (Mobility)  endurance/activity tolerance;strength  -CT     Row Name 06/02/20 1404          Bed Mobility Assessment/Treatment    Bed Mobility Assessment/Treatment  bed mobility (all) activities  -CT     [place] Level (Bed Mobility)  moderate assist (50% patient effort)  -CT     Bed Mobility, Safety Issues  decreased use of arms for pushing/pulling;decreased use of legs for bridging/pushing  -CT     Assistive Device (Bed  Mobility)  bed rails  -CT     Row Name 06/02/20 1404          Transfer Assessment/Treatment    Transfer Assessment/Treatment  sit-stand transfer;stand-sit transfer  -CT     Comment (Transfers)  pt was not able to get to full standing position  -CT     Sit-Stand [place] (Transfers)  moderate assist (50% patient effort)  -CT     Stand-Sit [place] (Transfers)  moderate assist (50% patient effort)  -CT     Row Name 06/02/20 1404          Sit-Stand Transfer    Assistive Device (Sit-Stand Transfers)  walker, front-wheeled  -CT     Row Name 06/02/20 1404          Stand-Sit Transfer    Assistive Device (Stand-Sit Transfers)  walker, front-wheeled  -CT     Row Name 06/02/20 1404          Gait/Stairs Assessment/Training    Comment (Gait/Stairs)  pt is uanble to ambulate  -CT     Row Name 06/02/20 1404          General ROM    GENERAL ROM COMMENTS  RLE WFL, L hip WFL  -CT     Row Name 06/02/20 1404          MMT (Manual Muscle Testing)    General MMT Comments  RLE grossly 3/5; L hip 3/5  -CT     Row Name             Wound 05/25/20 0845 Right anterior toe Arterial Ulcer    Wound - Properties Group Date first assessed: 05/25/20  -TW Time first assessed: 0845  -TW Present on Hospital Admission: Y  -TW Side: Right  -TW Orientation: anterior  -TW Location: toe  -TW Primary Wound Type: Arterial ulc  -TW    Row Name             Wound 05/25/20 0845 Right anterior;distal;lower ankle Venous Ulcer    Wound - Properties Group Date first assessed: 05/25/20  -TW Time first assessed: 0845  -TW Present on Hospital Admission: Y  -TW Side: Right  -TW Orientation: anterior;distal;lower  -TW Location: ankle  -TW, multiple areas noted open and scabbed  Primary Wound Type: Venous ulcer  -TW    Row Name             Wound 05/25/20 0845 Right anterior;midline;lower leg Venous Ulcer    Wound - Properties Group Date first assessed: 05/25/20  -TW Time first assessed: 0845  -TW Present on Hospital Admission: Y  -TW Side: Right  -TW Orientation:  anterior;midline;lower  -TW Location: leg  -TW, multiple areas noted on shin that are open and/or dry  Primary Wound Type: Venous ulcer  -TW    Row Name             Wound 05/25/20 0845 Right lower abdomen Other (comment)    Wound - Properties Group Date first assessed: 05/25/20 -TW Time first assessed: 0845  -TW Present on Hospital Admission: Y  -TW Side: Right  -TW Orientation: lower  -TW Location: abdomen  -TW Primary Wound Type: Other  -TW, one open area, one dry area noted     Row Name             Wound 05/25/20 0845 Right mid axillary Other (comment)    Wound - Properties Group Date first assessed: 05/25/20 -TW Time first assessed: 0845  -TW Present on Hospital Admission: Y  -TW Side: Right  -TW Location: mid axillary  -TW Primary Wound Type: Other  -TW, open area noted     Row Name             Wound 05/25/20 0845 Right shoulder Other (comment)    Wound - Properties Group Date first assessed: 05/25/20 -TW Time first assessed: 0845  -TW Present on Hospital Admission: Y  -TW Side: Right  -TW Location: shoulder  -TW, outer shoulder with open area noted  Primary Wound Type: Other  -TW, wound, open     Row Name             Wound 05/25/20 0845 scalp Other (comment)    Wound - Properties Group Date first assessed: 05/25/20 -TW Time first assessed: 0845  -TW Present on Hospital Admission: Y  -TW Location: scalp  -TW Primary Wound Type: Other  -TW, wound x 2 top of head; one with partial yellow slough     Row Name 06/02/20 1404          Plan of Care Review    Plan of Care Reviewed With  patient  -CT     Row Name 06/02/20 1404          Physical Therapy Clinical Impression    Date of Referral to PT  06/01/20  -CT     PT Diagnosis (PT Clinical Impression)  decreased functional mobility  -CT     Prognosis (PT Clinical Impression)  good  -CT     Functional Level at Time of Evaluation (PT Clinical Impression)  Mod A   -CT     Patient/Family Goals Statement (PT Clinical Impression)  Pt goals are to get a prosthetic and  walk again  -CT     Criteria for Skilled Interventions Met (PT Clinical Impression)  yes;treatment indicated  -CT     Pathology/Pathophysiology Noted (Describe Specifically for Each System)  musculoskeletal;neuromuscular  -CT     Impairments Found (describe specific impairments)  aerobic capacity/endurance;gait, locomotion, and balance  -CT     Functional Limitations in Following Categories (Describe Specific Limitations)  self-care;home management  -CT     Disability: Inability to Perform Actions/Activities of Required Roles (describe specific disability)  community/leisure  -CT     Rehab Potential (PT Clinical Summary)  good, to achieve stated therapy goals  -CT     Predicted Duration of Therapy (PT)  length of stay  -CT     Care Plan Review (PT)  evaluation/treatment results reviewed;care plan/treatment goals reviewed;risks/benefits reviewed;current/potential barriers reviewed;patient/other agree to care plan  -CT     Row Name 06/02/20 4527          Physical Therapy Goals    Bed Mobility Goal Selection (PT)  bed mobility, PT goal 1  -CT     Transfer Goal Selection (PT)  transfer, PT goal 1  -CT     Row Name 06/02/20 0276          Bed Mobility Goal 1 (PT)    Activity/Assistive Device (Bed Mobility Goal 1, PT)  bed mobility activities, all  -CT     [place] Level/Cues Needed (Bed Mobility Goal 1, PT)  minimum assist (75% or more patient effort)  -CT     Time Frame (Bed Mobility Goal 1, PT)  by discharge  -CT     Row Name 06/02/20 4584          Transfer Goal 1 (PT)    Activity/Assistive Device (Transfer Goal 1, PT)  bed-to-chair/chair-to-bed  -CT     [place] Level/Cues Needed (Transfer Goal 1, PT)  minimum assist (75% or more patient effort)  -CT     Time Frame (Transfer Goal 1, PT)  by discharge  -CT     Row Name 06/02/20 0771          Positioning and Restraints    Pre-Treatment Position  in bed  -CT     Post Treatment Position  bed  -CT     In Bed  sitting EOB;call light within reach;encouraged to call  for assist;notified [facility]  -CT       User Key  (r) = Recorded By, (t) = Taken By, (c) = Cosigned By    Initials Name Provider Type    Stacy Wagner, RN Registered Nurse    CT Nury Ochoa PT Physical Therapist        Physical Therapy Education                 Title: PT OT SLP Therapies (Done)     Topic: Physical Therapy (Done)     Point: Mobility training (Done)     Description:   Instruct learner(s) on safety and technique for assisting patient out of bed, chair or wheelchair.  Instruct in the proper use of assistive devices, such as walker, crutches, cane or brace.              Patient Friendly Description:   It's important to get you on your feet again, but we need to do so in a way that is safe for you. Falling has serious consequences, and your personal safety is the most important thing of all.        When it's time to get out of bed, one of us or a family member will sit next to you on the bed to give you support.     If your doctor or nurse tells you to use a walker, crutches, a cane, or a brace, be sure you use it every time you get out of bed, even if you think you don't need it.    Learning Progress Summary           Patient Acceptance, E,TB, VU by CT at 6/2/2020 1413                   Point: Home exercise program (Done)     Description:   Instruct learner(s) on appropriate technique for monitoring, assisting and/or progressing patient with therapeutic exercises and activities.              Learning Progress Summary           Patient Acceptance, E,TB, VU by CT at 6/2/2020 1413                   Point: Body mechanics (Done)     Description:   Instruct learner(s) on proper positioning and spine alignment for patient and/or caregiver during mobility tasks and/or exercises.              Learning Progress Summary           Patient Acceptance, E,TB, VU by CT at 6/2/2020 1413                   Point: Precautions (Done)     Description:   Instruct learner(s) on prescribed precautions during mobility and  gait tasks              Learning Progress Summary           Patient Acceptance, E,TB, VU by CT at 6/2/2020 1413                               User Key     Initials Effective Dates Name Provider Type Discipline    CT 04/03/18 -  Nury Ochoa, ALBERTA Physical Therapist PT              PT Recommendation and Plan  Anticipated Discharge Disposition (PT): home with assist  Planned Therapy Interventions (PT Eval): balance training, bed mobility training, gait training, home exercise program, manual therapy techniques, motor coordination training, neuromuscular re-education, patient/family education, postural re-education, strengthening, transfer training  Therapy Frequency (PT Clinical Impression): 3 times/wk(3-5 times/wk )  Outcome Summary/Treatment Plan (PT)  Anticipated Equipment Needs at Discharge (PT): (tbd)  Anticipated Discharge Disposition (PT): home with assist  Plan of Care Reviewed With: patient     Time Calculation:   PT Charges     Row Name 06/02/20 1413             Time Calculation    PT Received On  06/02/20  -CT      PT Goal Re-Cert Due Date  06/16/20  -CT        User Key  (r) = Recorded By, (t) = Taken By, (c) = Cosigned By    Initials Name Provider Type    CT Nury Ochoa, ALBERTA Physical Therapist        Therapy Charges for Today     Code Description Service Date Service Provider Modifiers Qty    52065222473 HC PT EVAL MOD COMPLEXITY 4 6/2/2020 Nury Ochoa, PT GP 1                 Nury Ochoa PT  6/2/2020

## 2020-06-02 NOTE — PROGRESS NOTES
Nephrology Progress Note      Subjective     Patient did not had Kayexylate, K went up to 6.6, denied any chest pain, or palpitations.     Objective       Vital signs :     Temp:  [97.7 °F (36.5 °C)-98.2 °F (36.8 °C)] 97.9 °F (36.6 °C)  Heart Rate:  [77-99] 96  Resp:  [18-20] 18  BP: ()/(53-92) 117/63      Intake/Output Summary (Last 24 hours) at 6/2/2020 1316  Last data filed at 6/2/2020 0500  Gross per 24 hour   Intake 1960.56 ml   Output 875 ml   Net 1085.56 ml       Physical Exam:    General Appearance : not in acute distress  Lungs : clear to auscultation, respirations regular  Heart :  regular rhythm & normal rate, normal S1, S2 and no murmur, no rub  Abdomen : normal bowel sounds, no masses, no hepatomegaly, no splenomegaly, soft non-tender and no guarding  Extremities : moves extremities well, no edema, no cyanosis and no redness  Skin :  no bleeding, bruising or rash  Neurologic :   orientated to person, place, time and situation, Grossly no focal deficits  Acess :       Laboratory Data :     Albumin Albumin   Date Value Ref Range Status   06/01/2020 2.16 (L) 3.50 - 5.20 g/dL Final   05/31/2020 2.20 (L) 3.50 - 5.20 g/dL Final      Magnesium Magnesium   Date Value Ref Range Status   06/02/2020 1.7 1.6 - 2.6 mg/dL Final          PTH               No results found for: PTH    CBC and coagulation:  Results from last 7 days   Lab Units 06/02/20  0116 06/01/20  0715 05/31/20  0049  05/28/20  0044   CRP mg/dL  --   --   --   --  0.17   WBC 10*3/mm3 7.33 7.93 6.39   < > 5.53   HEMOGLOBIN g/dL 7.5* 7.1* 7.4*   < > 7.3*   HEMATOCRIT % 26.0* 24.5* 26.3*   < > 25.4*   MCV fL 86.1 86.3 87.7   < > 85.5   MCHC g/dL 28.8* 29.0* 28.1*   < > 28.7*   PLATELETS 10*3/mm3 239 277 270   < > 245    < > = values in this interval not displayed.     Acid/base balance:      Renal and electrolytes:  Results from last 7 days   Lab Units 06/02/20  0511 06/02/20  0444 06/02/20  0116 06/01/20  0715 05/31/20  0049  05/30/20  0037   05/29/20 0036 05/27/20  0245   SODIUM mmol/L  --   --  129* 129* 133*  --  133*  --  132*   < > 132*   POTASSIUM mmol/L  --  6.4* 6.6* 6.0* 6.0*   < > 5.8*   < > 5.6*   < > 5.2   MAGNESIUM mg/dL 1.7  --   --   --   --   --   --   --   --   --  2.3   CHLORIDE mmol/L  --   --  103 101 103  --  104  --  105   < > 102   CO2 mmol/L  --   --  20.0* 20.8* 22.5  --  20.8*  --  19.3*   < > 20.9*   BUN mg/dL  --   --  45* 41* 34*  --  29*  --  28*   < > 23*   CREATININE mg/dL  --   --  1.25* 1.23* 1.18*  --  1.09*  --  1.27*   < > 1.31*   EGFR IF NONAFRICN AM mL/min/1.73  --   --  47* 48* 50*  --  55*  --  46*   < > 45*   CALCIUM mg/dL  --   --  7.9* 8.2* 8.2*  --  8.4*  --  7.7*   < > 8.3*    < > = values in this interval not displayed.     Estimated Creatinine Clearance: 68.8 mL/min (A) (by C-G formula based on SCr of 1.25 mg/dL (H)).    Liver and pancreatic function:  Results from last 7 days   Lab Units 06/01/20 0715 05/31/20 0049 05/29/20 0036   ALBUMIN g/dL 2.16* 2.20* 2.14*   BILIRUBIN mg/dL <0.2* <0.2* <0.2*   ALK PHOS U/L 142* 147* 146*   AST (SGOT) U/L 28 30 31   ALT (SGPT) U/L 19 19 19         Cardiac:      Liver and pancreatic function:  Results from last 7 days   Lab Units 06/01/20 0715 05/31/20 0049 05/29/20 0036   ALBUMIN g/dL 2.16* 2.20* 2.14*   BILIRUBIN mg/dL <0.2* <0.2* <0.2*   ALK PHOS U/L 142* 147* 146*   AST (SGOT) U/L 28 30 31   ALT (SGPT) U/L 19 19 19       Medications :       bacitracin  Topical Q12H   buprenorphine-naloxone 1.25 tablet Sublingual Daily   carvedilol 6.25 mg Oral BID With Meals   cholecalciferol 50,000 Units Oral Q7 Days   cloNIDine 0.2 mg Oral Q12H   docusate sodium 100 mg Oral BID   enoxaparin 40 mg Subcutaneous Q24H   folic acid 1 mg Oral Daily   insulin aspart 0-9 Units Subcutaneous TID AC   insulin detemir 45 Units Subcutaneous BID   lactulose 30 g Oral TID   magnesium sulfate 4 g Intravenous Once   melatonin 10 mg Oral Nightly   polyethylene glycol 17 g Oral Daily    rOPINIRole 0.25 mg Oral Nightly   senna-docusate sodium 1 tablet Oral BID   sodium chloride 10 mL Intravenous Q12H   sodium chloride 10 mL Intravenous Q12H   sodium chloride 10 mL Intravenous Q12H   sodium polystyrene 15 g Oral Q12H            Assessment/Plan     1. Persistent Hyperkalemia  2. Sever constipation  3. SARINA  4. DM-II  5. Essential hypertension     Persistent hyperkalemia now more concerning etiology type IV RTA with hyporeninemic hypoaldosteronism, work up pending. Educated and counseled patient in detail about dangers of hyperkalemia and to take kayexalate. She appropriately verbalized. F/U K in evening urine studies and will start on fludrocortisone.      Hyperkalemia likely 2/2 sever constipation, less likely from RTA.   Baseline Cr <1, admitted with 1.3, improved to 1.1  -cotinue on docusate, senna  -kayexylate bid  -f/u cortisol, aldosterone,/renin      Jonnathan Kinney MD  06/02/20  13:16

## 2020-06-02 NOTE — PROGRESS NOTES
Received call regarding critical potassium of 6.6. She is refusing kayexelate and reported she had her lactulose at 9PM.  Albuterol x1 ordered with regular insulin & dextrose.  Will repeat potassium and obtain EKG.       Veronica Buitrago PA-C  [facility] Medicine Team  06/02/20  02:18      Attending Physician: Chris Pichardo MD

## 2020-06-02 NOTE — PROGRESS NOTES
[facility] HOSPITALIST PROGRESS NOTE     Patient Identification:  Name:  Angelique Mcdermott  Age:  39 y.o.  Sex:  female  :  1980  MRN:  [id number]  Visit Number:  [id number]  ROOM: [address] [place] [address]     Primary Care Provider:  Provider, No Known    Length of stay in inpatient status:  8    Subjective     Chief Compliant:    Chief Complaint   Patient presents with   • Abdominal Pain       History of Presenting Illness:    Patient had been noncompliant with kayexalate yesterday but after bieng stressed how important it was, started taking it this morning and has had BMs in the interim. Reports feeling ok today, still feels tired and stressed.     ROS:  Otherwise 10 point ROS negative other than documented above in HPI.     Objective     Current Hospital Meds:  albuterol 10 mg Nebulization Once   bacitracin  Topical Q12H   buprenorphine-naloxone 1.25 tablet Sublingual Daily   carvedilol 6.25 mg Oral BID With Meals   cefTRIAXone 2 g Intravenous Q24H   cholecalciferol 50,000 Units Oral Q7 Days   cloNIDine 0.2 mg Oral Q12H   dextrose 50 mL Intravenous Once   And      insulin regular 10 Units Intravenous Once   docusate sodium 100 mg Oral BID   enoxaparin 40 mg Subcutaneous Q24H   folic acid 1 mg Oral Daily   insulin aspart 0-9 Units Subcutaneous TID AC   insulin detemir 45 Units Subcutaneous BID   lactulose 30 g Oral TID   melatonin 10 mg Oral Nightly   polyethylene glycol 17 g Oral Daily   rOPINIRole 0.25 mg Oral Nightly   senna-docusate sodium 1 tablet Oral BID   sodium chloride 500 mL Intravenous Once   sodium chloride 10 mL Intravenous Q12H   sodium chloride 10 mL Intravenous Q12H   sodium chloride 10 mL Intravenous Q12H   sodium polystyrene 30 g Oral Q12H   Vancomycin Pharmacy Intermittent Dosing  Does not apply Daily        Current Antimicrobial Therapy:  Anti-Infectives (From admission, onward)    Ordered     Dose/Rate Route Frequency Start Stop    20 1411  vancomycin 1 g/250 mL 0.9% NS  (vial-mate)     Ordering Provider:  Chris Pichardo MD    1,000 mg  over 60 Minutes Intravenous Once 06/02/20 1700 06/02/20 1730    06/02/20 1411  cefTRIAXone (ROCEPHIN) 2 g/100 mL 0.9% NS VTB (KARL)  Review   Ordering Provider:  Chris Pichardo MD    2 g  over 30 Minutes Intravenous Every 24 Hours 06/02/20 1600 06/05/20 1559    06/02/20 1411  Vancomycin Pharmacy Intermittent Dosing  Review   Ordering Provider:  Chris Pichardo MD     Does not apply Daily 06/02/20 1600 06/05/20 0859    05/31/20 2130  Vancomycin Pharmacy Intermittent Dosing     Ordering Provider:  Jeremiah Medina DO     Does not apply Daily 06/01/20 0900 06/02/20 0859    05/31/20 0925  vancomycin 1 g/250 mL 0.9% NS (vial-mate)     Ordering Provider:  Jeremiah Medina DO    1,000 mg  over 60 Minutes Intravenous Once 05/31/20 1100 05/31/20 1251    05/29/20 0741  vancomycin 1 g/250 mL 0.9% NS (vial-mate)     Ordering Provider:  Jeremiah Medina DO    1,000 mg  over 60 Minutes Intravenous Once 05/29/20 0900 05/29/20 1010    05/25/20 0828  cefTRIAXone (ROCEPHIN) 2 g/100 mL 0.9% NS VTB (KARL)     Ordering Provider:  Jeremiah Medina DO    2 g  over 30 Minutes Intravenous Every 24 Hours 05/26/20 0700 06/01/20 0611    05/25/20 0603  cefTRIAXone (ROCEPHIN) 2 g/100 mL 0.9% NS VTB (KARL)     Ordering Provider:  Sharon Romero PA    2 g  over 30 Minutes Intravenous Once 05/25/20 0605 05/25/20 0739        Current Diuretic Therapy:  Diuretics (From admission, onward)    None        ----------------------------------------------------------------------------------------------------------------------  Vital Signs:  Temp:  [97.7 °F (36.5 °C)-97.9 °F (36.6 °C)] 97.9 °F (36.6 °C)  Heart Rate:  [77-99] 87  Resp:  [16-18] 16  BP: (100-140)/(62-92) 114/75  SpO2:  [95 %-98 %] 95 %  on   ;   Device (Oxygen Therapy): room air  Body mass index is 25.4 kg/m².    Wt Readings from Last 3 Encounters:   06/02/20 73.6 kg (162 lb 3 oz)   10/16/18 59 kg  (130 lb)     Intake & Output (last 3 days)       05/31 0701 - 06/01 0700 06/01 0701 - 06/02 0700 06/02 0701 - 06/03 0700    P.O. 1660 1920 1080    I.V. (mL/kg) 100.2 (1.4)  388.6 (5.3)    IV Piggyback  1000.6     Total Intake(mL/kg) 1760.2 (24.3) 2920.6 (39.7) 1468.6 (20)    Urine (mL/kg/hr) 2290 (1.3) 1275 (0.7)     Stool  0     Total Output 2290 1275     Net -529.8 +1645.6 +1468.6           Urine Unmeasured Occurrence 1 x 1 x 3 x    Stool Unmeasured Occurrence  1 x 3 x        Diet Soft Texture; Chopped; Consistent Carbohydrate, Low Potassium  ----------------------------------------------------------------------------------------------------------------------  Physical exam:  Constitutional:  Well-developed and well-nourished.  No respiratory distress.      HENT:  Head:  Normocephalic and atraumatic.  Mouth:  Moist mucous membranes.    Eyes:  Conjunctivae and EOM are normal. No scleral icterus.    Neck:  Neck supple.  No JVD present.    Cardiovascular:  Normal rate, regular rhythm and normal heart sounds with no murmur.  Pulmonary/Chest:  No respiratory distress, no wheezes, no crackles, with normal breath sounds and good air movement.  Abdominal:  Soft.  Bowel sounds are normal.  No distension and no tenderness.   Musculoskeletal:  L AKA, L elbow swelling and warmth. Some Passive and active ROM able to be obtained but painful per patient.   Neurological:  Alert and oriented to person, place, and time.  No cranial nerve deficit.  No tongue deviation.  No facial droop.  No slurred speech.   Skin:  Skin is warm and dry. No rash noted. No pallor.   Peripheral vascular:  Pulses in all 4 extremities with no clubbing, no cyanosis, no edema.  ----------------------------------------------------------------------------------------------------------------------  Tele:    ----------------------------------------------------------------------------------------------------------------------  Results from last 7 days   Lab  Units 06/02/20  0116 06/01/20  0715 05/31/20  0049  05/28/20  0044   CRP mg/dL  --   --   --   --  0.17   WBC 10*3/mm3 7.33 7.93 6.39   < > 5.53   HEMOGLOBIN g/dL 7.5* 7.1* 7.4*   < > 7.3*   HEMATOCRIT % 26.0* 24.5* 26.3*   < > 25.4*   MCV fL 86.1 86.3 87.7   < > 85.5   MCHC g/dL 28.8* 29.0* 28.1*   < > 28.7*   PLATELETS 10*3/mm3 239 277 270   < > 245    < > = values in this interval not displayed.         Results from last 7 days   Lab Units 06/02/20  1734 06/02/20  1256 06/02/20  0511 06/02/20  0444 06/02/20  0116 06/01/20 0715 05/31/20  0049  05/29/20  0036  05/27/20  0245   SODIUM mmol/L  --   --   --   --  129* 129* 133*   < > 132*   < > 132*   POTASSIUM mmol/L 6.6* 6.2*  --  6.4* 6.6* 6.0* 6.0*   < > 5.6*   < > 5.2   MAGNESIUM mg/dL  --   --  1.7  --   --   --   --   --   --   --  2.3   CHLORIDE mmol/L  --   --   --   --  103 101 103   < > 105   < > 102   CO2 mmol/L  --   --   --   --  20.0* 20.8* 22.5   < > 19.3*   < > 20.9*   BUN mg/dL  --   --   --   --  45* 41* 34*   < > 28*   < > 23*   CREATININE mg/dL  --   --   --   --  1.25* 1.23* 1.18*   < > 1.27*   < > 1.31*   EGFR IF NONAFRICN AM mL/min/1.73  --   --   --   --  47* 48* 50*   < > 46*   < > 45*   CALCIUM mg/dL  --   --   --   --  7.9* 8.2* 8.2*   < > 7.7*   < > 8.3*   GLUCOSE mg/dL  --   --   --   --  130* 104* 206*   < > 252*   < > 141*   ALBUMIN g/dL  --   --   --   --   --  2.16* 2.20*  --  2.14*   < > 2.47*   BILIRUBIN mg/dL  --   --   --   --   --  <0.2* <0.2*  --  <0.2*   < > <0.2*   ALK PHOS U/L  --   --   --   --   --  142* 147*  --  146*   < > 158*   AST (SGOT) U/L  --   --   --   --   --  28 30  --  31   < > 28   ALT (SGPT) U/L  --   --   --   --   --  19 19  --  19   < > 17    < > = values in this interval not displayed.   Estimated Creatinine Clearance: 68.8 mL/min (A) (by C-G formula based on SCr of 1.25 mg/dL (H)).  No results found for: AMMONIA              Glucose   Date/Time Value Ref Range Status   06/02/2020 1550 84 70 - 130 mg/dL  Final   06/02/2020 1058 76 70 - 130 mg/dL Final   06/02/2020 0627 99 70 - 130 mg/dL Final   06/01/2020 2013 79 70 - 130 mg/dL Final   06/01/2020 1615 181 (H) 70 - 130 mg/dL Final   06/01/2020 1046 146 (H) 70 - 130 mg/dL Final   06/01/2020 0643 133 (H) 70 - 130 mg/dL Final   05/31/2020 2126 222 (H) 70 - 130 mg/dL Final     No results found for: TSH, FREET4  No results found for: PREGTESTUR, PREGSERUM, HCG, HCGQUANT  Pain Management Panel     Pain Management Panel Latest Ref Rng & Units 5/26/2020 5/25/2020    CREATININE UR mg/dL 39.3 -    AMPHETAMINES SCREEN, URINE Negative - Positive(A)    BARBITURATES SCREEN Negative - Negative    BENZODIAZEPINE SCREEN, URINE Negative - Negative    BUPRENORPHINEUR Negative - Positive(A)    COCAINE SCREEN, URINE Negative - Negative    METHADONE SCREEN, URINE Negative - Negative        Brief Urine Lab Results  (Last result in the past 365 days)      Color   Clarity   Blood   Leuk Est   Nitrite   Protein   CREAT   Urine HCG        05/26/20 2338             39.3           No results found for: BLOODCX  No results found for: URINECX  No results found for: WOUNDCX  No results found for: STOOLCX  No results found for: RESPCX  No results found for: AFBCX  Results from last 7 days   Lab Units 05/28/20  0044   CRP mg/dL 0.17       I have personally looked at the labs and they are summarized above.  ----------------------------------------------------------------------------------------------------------------------  Detailed radiology reports for the last 24 hours:    Imaging Results (Last 24 Hours)     ** No results found for the last 24 hours. **        Assessment & Plan    #SARINA vs. 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 0.97 om 10/18 only prior value  - Cr 1.25 this AM. Stable.   - Hyperkalemia persistent even with aggressive bowel regimen and bowel movements.   - Expressed importance for patient to comply with medications for hyperkalemia as  condition is life threatening.   - Etiology of hyperkalemia being evaluated by nephrology. 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 Fludrocortisone added by nephrology. Aldosterone and renin levels pending.Appreciate recs. Cortisol, aldosterone, renin, urine potassium levels ordered.   - K trended up this PM from 6.2=>6.6. Will give albuterol, insulin, and get EKG. Will increase kayexalate to 30 mg BID. Will continue to closely monitor.     #UTI  - Urine culture revealed normal jefferson. On ceftriaxone as below.      #L elbow effusion w/ concern for bursitis   - Patient declined surgical evaluation. Septic bursitis on differential. Day 7/10 of abx. Continue vancomycin and rocephin. Can deescalate when medically ready for discharge. Patient continues to opt against surgery.     #Hyponatremia   - Suspect hypovolemic or related to RTA. Will give another 500 cc bolus.     #DM II  - A1C 11.1%, Diabetes educator consulted   - SSI , titrate as needed     #Mild troponin leak  - No acute ischemic changes on EKG. Normalized on repeat.     #Stable normocytic anemia     #Substance abuse   - UDS positive for amphetamines and buprenorphine. Pt is prescribed Suboxone and admits to recent methamphetamine abuse.  - Plans to continue cessation at discharge. Has psychiatry f/u scheduled for 6/11.     #Cirrhosis w/ hx of hepatitis C  - Appears compensated. Outpatient hepatology f/u     #Constipation   #N/V  - Continue to escalate bowel regimen as able. Patient declining enemas.     #Hypomagnesemia   - Replaced     F: PO  E: Replace as needed   N: CC    Code status: Full     Dispo: Pending clinical improvement      VTE Prophylaxis:   Mechanical Order History:     None      Pharmalogical Order History:     Ordered     Dose Route Frequency Stop    05/25/20 8827  enoxaparin (LOVENOX) syringe 40 mg      40 mg SC Every 24 Hours --    05/25/20 0717  Pharmacy to Dose enoxaparin (LOVENOX)  Status:   Discontinued     Question:  Indication of use  Answer:  Prophylaxis    -- XX Continuous PRN 05/25/20 0727          Chris Pichardo MD  [facility]  06/02/20  19:12

## 2020-06-02 NOTE — PLAN OF CARE
Patient educated on compliance with medications. Patient became tearful and anxious. Medications taken and witnessed by RN. Multiple bowel movements noted during shift.

## 2020-06-02 NOTE — THERAPY EVALUATION
Acute Care - Occupational Therapy Initial Evaluation  [facility]     Patient Name: Angelique Mcdermott  : 1980  MRN: [id number]  Today's Date: 2020             Admit Date: 2020       ICD-10-CM ICD-9-CM   1. Type 2 diabetes mellitus with hyperglycemia, with long-term current use of insulin (CMS/HCC) E11.65 250.00    Z79.4 790.29     V58.67   2. Hyponatremia E87.1 276.1   3. Chronic hepatitis C without hepatic coma (CMS/HCC) B18.2 070.54   4. Methamphetamine abuse (CMS/HCC) F15.10 305.70   5. Acute renal failure, unspecified acute renal failure type (CMS/HCC) N17.9 584.9   6. Asymptomatic microscopic hematuria R31.21 599.72   7. Hypertension, unspecified type I10 401.9   8. Olecranon bursa abscess, left M71.022 727.89     Patient Active Problem List   Diagnosis   • Type 2 diabetes mellitus with hyperglycemia, with long-term current use of insulin (CMS/HCC)   • Olecranon bursa abscess, left     Past Medical History:   Diagnosis Date   • Anxiety    • CAD (coronary artery disease)    • CHF (congestive heart failure) (CMS/HCC)    • Chronic pain    • Cirrhosis (CMS/HCC)    • Depression    • Diabetes mellitus (CMS/HCC)    • Hepatitis B    • Hepatitis C    • Hypertension      Past Surgical History:   Procedure Laterality Date   • ABOVE KNEE AMPUTATION     • CHOLECYSTECTOMY     • HERNIA REPAIR     • HYSTERECTOMY            OT ASSESSMENT FLOWSHEET (last 12 hours)      Occupational Therapy Evaluation     Row Name 20 1113                   OT Evaluation Time/Intention    Document Type  evaluation  -KR        Mode of Treatment  occupational therapy  -KR        Patient Effort  adequate  -KR           General Information    Patient Observations  alert;cooperative;agree to therapy  -KR        Prior Level of Function  min assist:  -KR        Equipment Currently Used at Home  wheelchair  -KR           Relationship/Environment    Lives With  friend(s)  -KR           Cognitive Assessment/Intervention- PT/OT     Orientation Status (Cognition)  oriented x 3  -KR        Follows Commands (Cognition)  WFL  -KR           Bed Mobility Assessment/Treatment    Bed Mobility Assessment/Treatment  bed mobility (all) activities  -KR        [place] Level (Bed Mobility)  moderate assist (50% patient effort)  -KR           ADL Assessment/Intervention    BADL Assessment/Intervention  bathing;upper body dressing;lower body dressing;grooming;feeding;toileting  -KR           Bathing Assessment/Intervention    Bathing [place] Level  bathing skills;moderate assist (50% patient effort)  -KR           Upper Body Dressing Assessment/Training    Upper Body Dressing [place] Level  upper body dressing skills;minimum assist (75% patient effort)  -KR           Lower Body Dressing Assessment/Training    Lower Body Dressing [place] Level  lower body dressing skills;moderate assist (50% patient effort)  -KR           Grooming Assessment/Training    [place] Level (Grooming)  grooming skills;minimum assist (75% patient effort)  -KR           Self-Feeding Assessment/Training    [place] Level (Feeding)  feeding skills;set up  -KR           Toileting Assessment/Training    [place] Level (Toileting)  toileting skills;maximum assist (25% patient effort)  -KR           General ROM    GENERAL ROM COMMENTS  BUE WFL  -KR           MMT (Manual Muscle Testing)    General MMT Comments  BUE 3-/5  -KR           Wound 05/25/20 0845 Right anterior toe Arterial Ulcer    Wound - Properties Group Date first assessed: 05/25/20 -TW Time first assessed: 0845 -TW Present on Hospital Admission: Y  -TW Side: Right  -TW Orientation: anterior  -TW Location: toe  -TW Primary Wound Type: Arterial ulc  -TW       Wound 05/25/20 0845 Right anterior;distal;lower ankle Venous Ulcer    Wound - Properties Group Date first assessed: 05/25/20 -TW Time first assessed: 0845 -TW Present on Hospital Admission: Y  -TW Side: Right  -TW Orientation:  anterior;distal;lower  -TW Location: ankle  -TW, multiple areas noted open and scabbed  Primary Wound Type: Venous ulcer  -TW       Wound 05/25/20 0845 Right anterior;midline;lower leg Venous Ulcer    Wound - Properties Group Date first assessed: 05/25/20 -TW Time first assessed: 0845  -TW Present on Hospital Admission: Y  -TW Side: Right  -TW Orientation: anterior;midline;lower  -TW Location: leg  -TW, multiple areas noted on shin that are open and/or dry  Primary Wound Type: Venous ulcer  -TW       Wound 05/25/20 0845 Right lower abdomen Other (comment)    Wound - Properties Group Date first assessed: 05/25/20 -TW Time first assessed: 0845  -TW Present on Hospital Admission: Y  -TW Side: Right  -TW Orientation: lower  -TW Location: abdomen  -TW Primary Wound Type: Other  -TW, one open area, one dry area noted        Wound 05/25/20 0845 Right mid axillary Other (comment)    Wound - Properties Group Date first assessed: 05/25/20 -TW Time first assessed: 0845  -TW Present on Hospital Admission: Y  -TW Side: Right  -TW Location: mid axillary  -TW Primary Wound Type: Other  -TW, open area noted        Wound 05/25/20 0845 Right shoulder Other (comment)    Wound - Properties Group Date first assessed: 05/25/20 -TW Time first assessed: 0845  -TW Present on Hospital Admission: Y  -TW Side: Right  -TW Location: shoulder  -TW, outer shoulder with open area noted  Primary Wound Type: Other  -TW, wound, open        Wound 05/25/20 0845 scalp Other (comment)    Wound - Properties Group Date first assessed: 05/25/20 -TW Time first assessed: 0845  -TW Present on Hospital Admission: Y  -TW Location: scalp  -TW Primary Wound Type: Other  -TW, wound x 2 top of head; one with partial yellow slough        Clinical Impression (OT)    Criteria for Skilled Therapeutic Interventions Met (OT Eval)  yes  -KR        Rehab Potential (OT Eval)  good, to achieve stated therapy goals  -KR        Anticipated Discharge Disposition (OT)   home;home with assist  -KR           Planned OT Interventions    Planned Therapy Interventions (OT Eval)  ROM/therapeutic exercise;strengthening exercise;adaptive equipment training  -KR           OT Goals    Strength Goal Selection (OT)  strength, OT goal 1  -KR        Additional Documentation  Strength Goal Selection (OT) (Row)  -KR           Strength Goal 1 (OT)    Strength Goal 1 (OT)  BUE increase x 1 to enhance mobility skills  -KR        Time Frame (Strength Goal 1, OT)  by discharge  -KR           Patient Education Goal (OT)    Activity (Patient Education Goal, OT)  Pt.ind. with all AE/DME as needed to enhance safety/independence in home environment  -KR        [place]/Cues/Accuracy (Memory Goal 2, OT)  verbalizes understanding;demonstrates adequately  -KR        Time Frame (Patient Education Goal, OT)  by discharge  -KR          User Key  (r) = Recorded By, (t) = Taken By, (c) = Cosigned By    Initials Name Effective Dates    Stacy Wagner RN 06/16/16 -     Mekhi Newman OT 04/03/18 -                OT Recommendation and Plan  Outcome Summary/Treatment Plan (OT)  Anticipated Discharge Disposition (OT): home, home with assist  Planned Therapy Interventions (OT Eval): ROM/therapeutic exercise, strengthening exercise, adaptive equipment training           Time Calculation:     Therapy Charges for Today     Code Description Service Date Service Provider Modifiers Qty    [id number] HC OT EVAL MOD COMPLEXITY 4 6/2/2020 Mekhi Rodríguez OT GO 1               Mekhi Rodríguez OT  6/2/2020

## 2020-06-03 LAB
ANION GAP SERPL CALCULATED.3IONS-SCNC: 6.2 MMOL/L (ref 5–15)
BUN BLD-MCNC: 42 MG/DL (ref 6–20)
BUN/CREAT SERPL: 30.2 (ref 7–25)
CALCIUM SPEC-SCNC: 8.2 MG/DL (ref 8.6–10.5)
CHLORIDE SERPL-SCNC: 105 MMOL/L (ref 98–107)
CO2 SERPL-SCNC: 21.8 MMOL/L (ref 22–29)
CREAT BLD-MCNC: 1.39 MG/DL (ref 0.57–1)
GFR SERPL CREATININE-BSD FRML MDRD: 42 ML/MIN/1.73
GLUCOSE BLD-MCNC: 221 MG/DL (ref 65–99)
GLUCOSE BLDC GLUCOMTR-MCNC: 128 MG/DL (ref 70–130)
GLUCOSE BLDC GLUCOMTR-MCNC: 211 MG/DL (ref 70–130)
GLUCOSE BLDC GLUCOMTR-MCNC: 55 MG/DL (ref 70–130)
GLUCOSE BLDC GLUCOMTR-MCNC: 75 MG/DL (ref 70–130)
GLUCOSE BLDC GLUCOMTR-MCNC: 87 MG/DL (ref 70–130)
MAGNESIUM SERPL-MCNC: 2.4 MG/DL (ref 1.6–2.6)
OSMOLALITY UR: 267 MOSM/KG
POTASSIUM BLD-SCNC: 6.1 MMOL/L (ref 3.5–5.2)
POTASSIUM BLD-SCNC: 6.4 MMOL/L (ref 3.5–5.2)
SODIUM BLD-SCNC: 133 MMOL/L (ref 136–145)
VANCOMYCIN SERPL-MCNC: 21.3 MCG/ML (ref 5–40)

## 2020-06-03 PROCEDURE — 83735 ASSAY OF MAGNESIUM: CPT | Performed by: INTERNAL MEDICINE

## 2020-06-03 PROCEDURE — 94799 UNLISTED PULMONARY SVC/PX: CPT

## 2020-06-03 PROCEDURE — 63710000001 INSULIN DETEMIR PER 5 UNITS: Performed by: INTERNAL MEDICINE

## 2020-06-03 PROCEDURE — 25010000002 CEFTRIAXONE PER 250 MG: Performed by: INTERNAL MEDICINE

## 2020-06-03 PROCEDURE — 84132 ASSAY OF SERUM POTASSIUM: CPT | Performed by: INTERNAL MEDICINE

## 2020-06-03 PROCEDURE — 63710000001 INSULIN REGULAR HUMAN PER 5 UNITS: Performed by: INTERNAL MEDICINE

## 2020-06-03 PROCEDURE — 82962 GLUCOSE BLOOD TEST: CPT

## 2020-06-03 PROCEDURE — 25010000002 PROMETHAZINE PER 50 MG: Performed by: INTERNAL MEDICINE

## 2020-06-03 PROCEDURE — 80202 ASSAY OF VANCOMYCIN: CPT

## 2020-06-03 PROCEDURE — 25010000002 ENOXAPARIN PER 10 MG: Performed by: INTERNAL MEDICINE

## 2020-06-03 PROCEDURE — 63710000001 INSULIN ASPART PER 5 UNITS: Performed by: INTERNAL MEDICINE

## 2020-06-03 PROCEDURE — G0108 DIAB MANAGE TRN  PER INDIV: HCPCS

## 2020-06-03 PROCEDURE — 63710000001 DIPHENHYDRAMINE PER 50 MG: Performed by: INTERNAL MEDICINE

## 2020-06-03 PROCEDURE — 80048 BASIC METABOLIC PNL TOTAL CA: CPT | Performed by: INTERNAL MEDICINE

## 2020-06-03 PROCEDURE — 25010000002 ONDANSETRON PER 1 MG: Performed by: INTERNAL MEDICINE

## 2020-06-03 PROCEDURE — 99233 SBSQ HOSP IP/OBS HIGH 50: CPT | Performed by: INTERNAL MEDICINE

## 2020-06-03 RX ORDER — DEXTROSE MONOHYDRATE 25 G/50ML
50 INJECTION, SOLUTION INTRAVENOUS ONCE
Status: COMPLETED | OUTPATIENT
Start: 2020-06-03 | End: 2020-06-03

## 2020-06-03 RX ORDER — FLUDROCORTISONE ACETATE 0.1 MG/1
50 TABLET ORAL DAILY
Status: DISCONTINUED | OUTPATIENT
Start: 2020-06-03 | End: 2020-06-10

## 2020-06-03 RX ORDER — L.ACID,PARA/B.BIFIDUM/S.THERM 8B CELL
1 CAPSULE ORAL DAILY
Status: DISCONTINUED | OUTPATIENT
Start: 2020-06-03 | End: 2020-06-15 | Stop reason: HOSPADM

## 2020-06-03 RX ORDER — SODIUM POLYSTYRENE SULFONATE 4.1 MEQ/G
20 POWDER, FOR SUSPENSION ORAL; RECTAL EVERY 12 HOURS SCHEDULED
Status: DISCONTINUED | OUTPATIENT
Start: 2020-06-03 | End: 2020-06-04

## 2020-06-03 RX ORDER — ZOLPIDEM TARTRATE 5 MG/1
5 TABLET ORAL ONCE
Status: COMPLETED | OUTPATIENT
Start: 2020-06-03 | End: 2020-06-03

## 2020-06-03 RX ADMIN — CLONIDINE HYDROCHLORIDE 0.2 MG: 0.2 TABLET ORAL at 07:59

## 2020-06-03 RX ADMIN — LACTULOSE 30 G: 10 SOLUTION ORAL at 08:00

## 2020-06-03 RX ADMIN — DOCUSATE SODIUM 50 MG AND SENNOSIDES 8.6 MG 1 TABLET: 8.6; 5 TABLET, FILM COATED ORAL at 20:11

## 2020-06-03 RX ADMIN — Medication 10 MG: at 20:11

## 2020-06-03 RX ADMIN — ZOLPIDEM TARTRATE 5 MG: 5 TABLET ORAL at 20:10

## 2020-06-03 RX ADMIN — PROMETHAZINE HYDROCHLORIDE 12.5 MG: 25 INJECTION INTRAMUSCULAR; INTRAVENOUS at 15:50

## 2020-06-03 RX ADMIN — Medication 1 CAPSULE: at 12:41

## 2020-06-03 RX ADMIN — SODIUM CHLORIDE, PRESERVATIVE FREE 10 ML: 5 INJECTION INTRAVENOUS at 20:09

## 2020-06-03 RX ADMIN — PROMETHAZINE HYDROCHLORIDE 12.5 MG: 25 INJECTION INTRAMUSCULAR; INTRAVENOUS at 02:35

## 2020-06-03 RX ADMIN — CARVEDILOL 6.25 MG: 6.25 TABLET, FILM COATED ORAL at 07:58

## 2020-06-03 RX ADMIN — ROPINIROLE HYDROCHLORIDE 0.25 MG: 0.25 TABLET, FILM COATED ORAL at 20:11

## 2020-06-03 RX ADMIN — ONDANSETRON 4 MG: 2 INJECTION INTRAMUSCULAR; INTRAVENOUS at 00:53

## 2020-06-03 RX ADMIN — POLYETHYLENE GLYCOL (3350) 17 G: 17 POWDER, FOR SOLUTION ORAL at 07:56

## 2020-06-03 RX ADMIN — PROMETHAZINE HYDROCHLORIDE 12.5 MG: 25 INJECTION INTRAMUSCULAR; INTRAVENOUS at 21:35

## 2020-06-03 RX ADMIN — FLUDROCORTISONE ACETATE 50 MCG: 0.1 TABLET ORAL at 12:41

## 2020-06-03 RX ADMIN — SODIUM POLYSTYRENE SULFONATE 20 G: 1 POWDER ORAL; RECTAL at 20:10

## 2020-06-03 RX ADMIN — DIPHENHYDRAMINE HYDROCHLORIDE 25 MG: 25 CAPSULE ORAL at 02:58

## 2020-06-03 RX ADMIN — SODIUM CHLORIDE 2 G: 900 INJECTION INTRAVENOUS at 15:02

## 2020-06-03 RX ADMIN — ONDANSETRON 4 MG: 2 INJECTION INTRAMUSCULAR; INTRAVENOUS at 12:49

## 2020-06-03 RX ADMIN — ENOXAPARIN SODIUM 40 MG: 40 INJECTION SUBCUTANEOUS at 07:56

## 2020-06-03 RX ADMIN — ALBUTEROL SULFATE 10 MG: 2.5 SOLUTION RESPIRATORY (INHALATION) at 07:06

## 2020-06-03 RX ADMIN — ONDANSETRON 4 MG: 2 INJECTION INTRAMUSCULAR; INTRAVENOUS at 20:11

## 2020-06-03 RX ADMIN — DEXTROSE MONOHYDRATE 50 ML: 25 INJECTION, SOLUTION INTRAVENOUS at 05:44

## 2020-06-03 RX ADMIN — LACTULOSE 30 G: 10 SOLUTION ORAL at 20:10

## 2020-06-03 RX ADMIN — VANCOMYCIN HYDROCHLORIDE 1000 MG: 1 INJECTION, POWDER, LYOPHILIZED, FOR SOLUTION INTRAVENOUS at 20:10

## 2020-06-03 RX ADMIN — INSULIN DETEMIR 45 UNITS: 100 INJECTION, SOLUTION SUBCUTANEOUS at 07:54

## 2020-06-03 RX ADMIN — BUPRENORPHINE HYDROCHLORIDE AND NALOXONE HYDROCHLORIDE 1.25 TABLET: 8; 2 TABLET SUBLINGUAL at 07:56

## 2020-06-03 RX ADMIN — SODIUM POLYSTYRENE SULFONATE 30 G: 1 POWDER ORAL; RECTAL at 08:01

## 2020-06-03 RX ADMIN — CARVEDILOL 6.25 MG: 6.25 TABLET, FILM COATED ORAL at 18:09

## 2020-06-03 RX ADMIN — INSULIN ASPART 3 UNITS: 100 INJECTION, SOLUTION INTRAVENOUS; SUBCUTANEOUS at 07:59

## 2020-06-03 RX ADMIN — TIZANIDINE 4 MG: 4 TABLET ORAL at 12:49

## 2020-06-03 RX ADMIN — LACTULOSE 30 G: 10 SOLUTION ORAL at 15:02

## 2020-06-03 RX ADMIN — PROMETHAZINE HYDROCHLORIDE 12.5 MG: 25 INJECTION INTRAMUSCULAR; INTRAVENOUS at 09:17

## 2020-06-03 RX ADMIN — HUMAN INSULIN 10 UNITS: 100 INJECTION, SOLUTION SUBCUTANEOUS at 05:44

## 2020-06-03 RX ADMIN — DIPHENHYDRAMINE HYDROCHLORIDE 25 MG: 25 CAPSULE ORAL at 20:11

## 2020-06-03 RX ADMIN — BACITRACIN: 500 OINTMENT TOPICAL at 07:56

## 2020-06-03 RX ADMIN — BACITRACIN: 500 OINTMENT TOPICAL at 20:09

## 2020-06-03 NOTE — PROGRESS NOTES
Nephrology Progress Note      Subjective   Pt is doing fine, no palpitations, shortness of breath or chest pain    Objective       Vital signs :     Temp:  [97.4 °F (36.3 °C)-98.6 °F (37 °C)] 98.6 °F (37 °C)  Heart Rate:  [87-99] 96  Resp:  [14-18] 14  BP: (106-140)/(63-89) 137/76      Intake/Output Summary (Last 24 hours) at 6/3/2020 0911  Last data filed at 6/3/2020 0906  Gross per 24 hour   Intake 3369.63 ml   Output 1000 ml   Net 2369.63 ml       Physical Exam:    General Appearance : not in acute distress  Lungs : clear to auscultation, respirations regular  Heart :  regular rhythm & normal rate, normal S1, S2 and no murmur, no rub  Abdomen : normal bowel sounds, no masses, no hepatomegaly, no splenomegaly, soft non-tender and no guarding  Extremities : moves extremities well, no edema, no cyanosis and no redness  Skin :  no bleeding, bruising or rash  Neurologic :   orientated to person, place, time and situation, Grossly no focal deficits  Acess :       Laboratory Data :     Albumin Albumin   Date Value Ref Range Status   06/01/2020 2.16 (L) 3.50 - 5.20 g/dL Final      Magnesium Magnesium   Date Value Ref Range Status   06/03/2020 2.4 1.6 - 2.6 mg/dL Final   06/02/2020 1.7 1.6 - 2.6 mg/dL Final          PTH               No results found for: PTH    CBC and coagulation:  Results from last 7 days   Lab Units 06/02/20  0116 06/01/20  0715 05/31/20  0049  05/28/20  0044   CRP mg/dL  --   --   --   --  0.17   WBC 10*3/mm3 7.33 7.93 6.39   < > 5.53   HEMOGLOBIN g/dL 7.5* 7.1* 7.4*   < > 7.3*   HEMATOCRIT % 26.0* 24.5* 26.3*   < > 25.4*   MCV fL 86.1 86.3 87.7   < > 85.5   MCHC g/dL 28.8* 29.0* 28.1*   < > 28.7*   PLATELETS 10*3/mm3 239 277 270   < > 245    < > = values in this interval not displayed.     Acid/base balance:      Renal and electrolytes:  Results from last 7 days   Lab Units 06/03/20  0406 06/02/20  2038 06/02/20  1734  06/02/20  0511  06/02/20  0116 06/01/20  0715 05/31/20  0049  05/30/20  0037    SODIUM mmol/L 133*  --   --   --   --   --  129* 129* 133*  --  133*   POTASSIUM mmol/L 6.4* 6.0* 6.6*   < >  --    < > 6.6* 6.0* 6.0*   < > 5.8*   MAGNESIUM mg/dL 2.4  --   --   --  1.7  --   --   --   --   --   --    CHLORIDE mmol/L 105  --   --   --   --   --  103 101 103  --  104   CO2 mmol/L 21.8*  --   --   --   --   --  20.0* 20.8* 22.5  --  20.8*   BUN mg/dL 42*  --   --   --   --   --  45* 41* 34*  --  29*   CREATININE mg/dL 1.39*  --   --   --   --   --  1.25* 1.23* 1.18*  --  1.09*   EGFR IF NONAFRICN AM mL/min/1.73 42*  --   --   --   --   --  47* 48* 50*  --  55*   CALCIUM mg/dL 8.2*  --   --   --   --   --  7.9* 8.2* 8.2*  --  8.4*    < > = values in this interval not displayed.     Estimated Creatinine Clearance: 56 mL/min (A) (by C-G formula based on SCr of 1.39 mg/dL (H)).    Liver and pancreatic function:  Results from last 7 days   Lab Units 06/01/20 0715 05/31/20 0049 05/29/20  0036   ALBUMIN g/dL 2.16* 2.20* 2.14*   BILIRUBIN mg/dL <0.2* <0.2* <0.2*   ALK PHOS U/L 142* 147* 146*   AST (SGOT) U/L 28 30 31   ALT (SGPT) U/L 19 19 19         Cardiac:      Liver and pancreatic function:  Results from last 7 days   Lab Units 06/01/20 0715 05/31/20 0049 05/29/20  0036   ALBUMIN g/dL 2.16* 2.20* 2.14*   BILIRUBIN mg/dL <0.2* <0.2* <0.2*   ALK PHOS U/L 142* 147* 146*   AST (SGOT) U/L 28 30 31   ALT (SGPT) U/L 19 19 19       Medications :       bacitracin  Topical Q12H   buprenorphine-naloxone 1.25 tablet Sublingual Daily   carvedilol 6.25 mg Oral BID With Meals   cefTRIAXone 2 g Intravenous Q24H   cholecalciferol 50,000 Units Oral Q7 Days   cloNIDine 0.2 mg Oral Q12H   docusate sodium 100 mg Oral BID   enoxaparin 40 mg Subcutaneous Q24H   folic acid 1 mg Oral Daily   insulin aspart 0-9 Units Subcutaneous TID AC   insulin detemir 45 Units Subcutaneous BID   lactulose 30 g Oral TID   melatonin 10 mg Oral Nightly   polyethylene glycol 17 g Oral Daily   rOPINIRole 0.25 mg Oral Nightly   senna-docusate  sodium 1 tablet Oral BID   sodium chloride 10 mL Intravenous Q12H   sodium chloride 10 mL Intravenous Q12H   sodium chloride 10 mL Intravenous Q12H   sodium polystyrene 30 g Oral Q12H   Vancomycin Pharmacy Intermittent Dosing  Does not apply Daily            Assessment/Plan     1. Persistent Hyperkalemia  2. Sever constipation  3. SARINA  4. DM-II  5. 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 She appropriately verbalized. F/U K in evening urine studies and will start on fludrocortisone.      Hyperkalemia likely 2/2 sever constipation, less likely from RTA.   Baseline Cr <1, admitted with 1.3, improved to 1.1  -cotinue on docusate, senna  -kayexylate bid  -fludrocortisone 50mcg daily  -f/u, aldosterone,/renin      Jonnathan Kinney MD  06/03/20  09:11

## 2020-06-03 NOTE — PLAN OF CARE
Pt educated and encouraged to take scheduled medications as well as to follow modified low potassium diet. Pt reluctant. Notified pt that urine specimen needed as well. Potassium currently 6.0 which has decreased from 6.6. VSS. Will continue to closely monitor.

## 2020-06-03 NOTE — PROGRESS NOTES
Discharge Planning Assessment   [place]     Patient Name: Angelique Mcdermott  MRN: [id number]  Today's Date: 6/3/2020    Admit Date: 5/25/2020        Discharge Plan     Row Name 06/03/20 1658       Plan    Plan  Pt admitted on 5/25/20.  Pt transferred to [address] [place] [address].  SVETA Bree received previous consult to arrange orbactiv infusion at discharge and arranged with infusion clinic and [facility].  SS will need notification if pt will require this at discharge.  SS will follow                 NADINE Maldonado

## 2020-06-03 NOTE — PLAN OF CARE
Problem: Patient Care Overview  Goal: Plan of Care Review  Outcome: Ongoing (interventions implemented as appropriate)  Flowsheets (Taken 6/3/2020 1701)  Progress: no change  Plan of Care Reviewed With: patient  Outcome Summary: Patient's potassium still elevated at this time, pt has had a BM today, and has been compliant with kayexulate and lactulose medications. Pt's glucose was elevated this morning but has since normalized. Pt continues to request snacks frequently. Wound care performed on wounds. Pt denies any pain at this time, only complaint has been intermittent nausea.

## 2020-06-03 NOTE — PROGRESS NOTES
Random vancomycin level obtained today was reported at 21.3 mg/L.  Will give 1g x 1 dose today to maintain therapeutic levels.  Pharmacy will continue to follow and determine further dosing with random levels until renal function improves. Thank you.

## 2020-06-04 LAB
ALBUMIN SERPL-MCNC: 2.45 G/DL (ref 3.5–5.2)
ALBUMIN UR-MCNC: 32.6 MG/DL
ALBUMIN/GLOB SERPL: 0.5 G/DL
ALP SERPL-CCNC: 157 U/L (ref 39–117)
ALT SERPL W P-5'-P-CCNC: 24 U/L (ref 1–33)
ANION GAP SERPL CALCULATED.3IONS-SCNC: 5.9 MMOL/L (ref 5–15)
ANISOCYTOSIS BLD QL: NORMAL
AST SERPL-CCNC: 35 U/L (ref 1–32)
BASOPHILS # BLD AUTO: 0.09 10*3/MM3 (ref 0–0.2)
BASOPHILS NFR BLD AUTO: 1.2 % (ref 0–1.5)
BILIRUB SERPL-MCNC: <0.2 MG/DL (ref 0.2–1.2)
BUN BLD-MCNC: 36 MG/DL (ref 6–20)
BUN/CREAT SERPL: 26.5 (ref 7–25)
CALCIUM SPEC-SCNC: 8.4 MG/DL (ref 8.6–10.5)
CHLORIDE SERPL-SCNC: 104 MMOL/L (ref 98–107)
CO2 SERPL-SCNC: 23.1 MMOL/L (ref 22–29)
CREAT BLD-MCNC: 1.36 MG/DL (ref 0.57–1)
CREAT UR-MCNC: 13.4 MG/DL
DEPRECATED RDW RBC AUTO: 63.6 FL (ref 37–54)
EOSINOPHIL # BLD AUTO: 0.5 10*3/MM3 (ref 0–0.4)
EOSINOPHIL NFR BLD AUTO: 6.8 % (ref 0.3–6.2)
ERYTHROCYTE [DISTWIDTH] IN BLOOD BY AUTOMATED COUNT: 19.7 % (ref 12.3–15.4)
GFR SERPL CREATININE-BSD FRML MDRD: 43 ML/MIN/1.73
GLOBULIN UR ELPH-MCNC: 5.4 GM/DL
GLUCOSE BLD-MCNC: 47 MG/DL (ref 65–99)
GLUCOSE BLDC GLUCOMTR-MCNC: 102 MG/DL (ref 70–130)
GLUCOSE BLDC GLUCOMTR-MCNC: 121 MG/DL (ref 70–130)
GLUCOSE BLDC GLUCOMTR-MCNC: 169 MG/DL (ref 70–130)
GLUCOSE BLDC GLUCOMTR-MCNC: 196 MG/DL (ref 70–130)
GLUCOSE BLDC GLUCOMTR-MCNC: 217 MG/DL (ref 70–130)
GLUCOSE BLDC GLUCOMTR-MCNC: 292 MG/DL (ref 70–130)
GLUCOSE BLDC GLUCOMTR-MCNC: 44 MG/DL (ref 70–130)
GLUCOSE BLDC GLUCOMTR-MCNC: 68 MG/DL (ref 70–130)
HCT VFR BLD AUTO: 24.9 % (ref 34–46.6)
HGB BLD-MCNC: 6.9 G/DL (ref 12–15.9)
HYPOCHROMIA BLD QL: NORMAL
IMM GRANULOCYTES # BLD AUTO: 0.04 10*3/MM3 (ref 0–0.05)
IMM GRANULOCYTES NFR BLD AUTO: 0.5 % (ref 0–0.5)
LYMPHOCYTES # BLD AUTO: 2.73 10*3/MM3 (ref 0.7–3.1)
LYMPHOCYTES NFR BLD AUTO: 37.3 % (ref 19.6–45.3)
MCH RBC QN AUTO: 24.6 PG (ref 26.6–33)
MCHC RBC AUTO-ENTMCNC: 27.7 G/DL (ref 31.5–35.7)
MCV RBC AUTO: 88.9 FL (ref 79–97)
MICROALBUMIN/CREAT UR: 2432.8 MG/G
MONOCYTES # BLD AUTO: 0.72 10*3/MM3 (ref 0.1–0.9)
MONOCYTES NFR BLD AUTO: 9.8 % (ref 5–12)
NEUTROPHILS # BLD AUTO: 3.24 10*3/MM3 (ref 1.7–7)
NEUTROPHILS NFR BLD AUTO: 44.4 % (ref 42.7–76)
NRBC BLD AUTO-RTO: 0 /100 WBC (ref 0–0.2)
PLAT MORPH BLD: NORMAL
PLATELET # BLD AUTO: 305 10*3/MM3 (ref 140–450)
PMV BLD AUTO: 9.7 FL (ref 6–12)
POTASSIUM BLD-SCNC: 5.3 MMOL/L (ref 3.5–5.2)
PROT SERPL-MCNC: 7.8 G/DL (ref 6–8.5)
RBC # BLD AUTO: 2.8 10*6/MM3 (ref 3.77–5.28)
SODIUM BLD-SCNC: 133 MMOL/L (ref 136–145)
SODIUM UR-SCNC: 102 MMOL/L
WBC NRBC COR # BLD: 7.32 10*3/MM3 (ref 3.4–10.8)

## 2020-06-04 PROCEDURE — 84300 ASSAY OF URINE SODIUM: CPT | Performed by: INTERNAL MEDICINE

## 2020-06-04 PROCEDURE — 80053 COMPREHEN METABOLIC PANEL: CPT | Performed by: INTERNAL MEDICINE

## 2020-06-04 PROCEDURE — 25010000002 PROMETHAZINE PER 50 MG: Performed by: INTERNAL MEDICINE

## 2020-06-04 PROCEDURE — 63710000001 INSULIN ASPART PER 5 UNITS: Performed by: INTERNAL MEDICINE

## 2020-06-04 PROCEDURE — 82570 ASSAY OF URINE CREATININE: CPT | Performed by: INTERNAL MEDICINE

## 2020-06-04 PROCEDURE — 97530 THERAPEUTIC ACTIVITIES: CPT

## 2020-06-04 PROCEDURE — 85025 COMPLETE CBC W/AUTO DIFF WBC: CPT | Performed by: INTERNAL MEDICINE

## 2020-06-04 PROCEDURE — 25010000002 ONDANSETRON PER 1 MG: Performed by: INTERNAL MEDICINE

## 2020-06-04 PROCEDURE — 82043 UR ALBUMIN QUANTITATIVE: CPT | Performed by: INTERNAL MEDICINE

## 2020-06-04 PROCEDURE — 82962 GLUCOSE BLOOD TEST: CPT

## 2020-06-04 PROCEDURE — 99233 SBSQ HOSP IP/OBS HIGH 50: CPT | Performed by: INTERNAL MEDICINE

## 2020-06-04 PROCEDURE — 85007 BL SMEAR W/DIFF WBC COUNT: CPT | Performed by: INTERNAL MEDICINE

## 2020-06-04 PROCEDURE — 25010000002 CEFTRIAXONE PER 250 MG: Performed by: INTERNAL MEDICINE

## 2020-06-04 PROCEDURE — 97110 THERAPEUTIC EXERCISES: CPT

## 2020-06-04 PROCEDURE — 25010000002 ENOXAPARIN PER 10 MG: Performed by: INTERNAL MEDICINE

## 2020-06-04 RX ORDER — NICOTINE POLACRILEX 4 MG
15 LOZENGE BUCCAL
Status: DISCONTINUED | OUTPATIENT
Start: 2020-06-04 | End: 2020-06-15 | Stop reason: HOSPADM

## 2020-06-04 RX ORDER — DEXTROSE MONOHYDRATE 25 G/50ML
25 INJECTION, SOLUTION INTRAVENOUS
Status: DISCONTINUED | OUTPATIENT
Start: 2020-06-04 | End: 2020-06-15 | Stop reason: HOSPADM

## 2020-06-04 RX ADMIN — CLONIDINE HYDROCHLORIDE 0.2 MG: 0.2 TABLET ORAL at 21:55

## 2020-06-04 RX ADMIN — CLONIDINE HYDROCHLORIDE 0.2 MG: 0.2 TABLET ORAL at 09:17

## 2020-06-04 RX ADMIN — FLUDROCORTISONE ACETATE 50 MCG: 0.1 TABLET ORAL at 09:17

## 2020-06-04 RX ADMIN — DEXTROSE MONOHYDRATE 25 G: 500 INJECTION PARENTERAL at 06:33

## 2020-06-04 RX ADMIN — CARVEDILOL 6.25 MG: 6.25 TABLET, FILM COATED ORAL at 18:15

## 2020-06-04 RX ADMIN — ONDANSETRON 4 MG: 2 INJECTION INTRAMUSCULAR; INTRAVENOUS at 18:21

## 2020-06-04 RX ADMIN — ROPINIROLE HYDROCHLORIDE 0.25 MG: 0.25 TABLET, FILM COATED ORAL at 21:55

## 2020-06-04 RX ADMIN — LACTULOSE 30 G: 10 SOLUTION ORAL at 18:15

## 2020-06-04 RX ADMIN — LACTULOSE 30 G: 10 SOLUTION ORAL at 21:55

## 2020-06-04 RX ADMIN — BACITRACIN: 500 OINTMENT TOPICAL at 21:55

## 2020-06-04 RX ADMIN — FOLIC ACID 1 MG: 1 TABLET ORAL at 09:17

## 2020-06-04 RX ADMIN — ONDANSETRON 4 MG: 2 INJECTION INTRAMUSCULAR; INTRAVENOUS at 02:30

## 2020-06-04 RX ADMIN — DOCUSATE SODIUM 50 MG AND SENNOSIDES 8.6 MG 1 TABLET: 8.6; 5 TABLET, FILM COATED ORAL at 09:17

## 2020-06-04 RX ADMIN — TIZANIDINE 4 MG: 4 TABLET ORAL at 02:30

## 2020-06-04 RX ADMIN — ENOXAPARIN SODIUM 40 MG: 40 INJECTION SUBCUTANEOUS at 09:21

## 2020-06-04 RX ADMIN — LACTULOSE 30 G: 10 SOLUTION ORAL at 09:18

## 2020-06-04 RX ADMIN — SODIUM CHLORIDE, PRESERVATIVE FREE 10 ML: 5 INJECTION INTRAVENOUS at 21:57

## 2020-06-04 RX ADMIN — PROMETHAZINE HYDROCHLORIDE 12.5 MG: 25 INJECTION INTRAMUSCULAR; INTRAVENOUS at 21:55

## 2020-06-04 RX ADMIN — PROMETHAZINE HYDROCHLORIDE 12.5 MG: 25 INJECTION INTRAMUSCULAR; INTRAVENOUS at 05:29

## 2020-06-04 RX ADMIN — SODIUM CHLORIDE, PRESERVATIVE FREE 10 ML: 5 INJECTION INTRAVENOUS at 09:20

## 2020-06-04 RX ADMIN — BUPRENORPHINE HYDROCHLORIDE AND NALOXONE HYDROCHLORIDE 1.25 TABLET: 8; 2 TABLET SUBLINGUAL at 09:16

## 2020-06-04 RX ADMIN — Medication 1 CAPSULE: at 09:17

## 2020-06-04 RX ADMIN — INSULIN ASPART 4 UNITS: 100 INJECTION, SOLUTION INTRAVENOUS; SUBCUTANEOUS at 12:16

## 2020-06-04 RX ADMIN — SODIUM CHLORIDE 2 G: 900 INJECTION INTRAVENOUS at 18:15

## 2020-06-04 RX ADMIN — DEXTROSE MONOHYDRATE 25 G: 500 INJECTION PARENTERAL at 05:28

## 2020-06-04 RX ADMIN — PROMETHAZINE HYDROCHLORIDE 12.5 MG: 25 INJECTION INTRAMUSCULAR; INTRAVENOUS at 14:11

## 2020-06-04 RX ADMIN — INSULIN ASPART 3 UNITS: 100 INJECTION, SOLUTION INTRAVENOUS; SUBCUTANEOUS at 18:15

## 2020-06-04 RX ADMIN — CARVEDILOL 6.25 MG: 6.25 TABLET, FILM COATED ORAL at 09:17

## 2020-06-04 RX ADMIN — Medication 10 MG: at 21:55

## 2020-06-04 RX ADMIN — TIZANIDINE 4 MG: 4 TABLET ORAL at 21:55

## 2020-06-04 RX ADMIN — SODIUM CHLORIDE 1000 ML: 9 INJECTION, SOLUTION INTRAVENOUS at 12:17

## 2020-06-04 RX ADMIN — DOCUSATE SODIUM 100 MG: 100 CAPSULE, LIQUID FILLED ORAL at 09:17

## 2020-06-04 RX ADMIN — BACITRACIN: 500 OINTMENT TOPICAL at 09:18

## 2020-06-04 NOTE — PROGRESS NOTES
[facility] Medicine Services  CROSS COVER NOTE    Contacted per RN, pt BG 55. Orders placed to hold PM dose of Levemir. Likely secondary to treatment for hyperkalemia earlier in the day. Monitor BG closely, hypoglycemia protocol in place if necessary. Defer to AM attending for insulin adjustments, cont sliding scale insulin coverage as necessary. HgbA1C 11, labile.       Angeline Pastrana PA-C  Hospitalist Service -- [facility]   Pager: [phone]    06/03/20  21:04

## 2020-06-04 NOTE — PROGRESS NOTES
Inpatient Progress Note  Angelique Mcdermott  Date: 06/04/20  MRN: [id number]      Subjective:   Had discussion with Dr. stewart he requested Ortho reevaluate the patient and discuss potential surgical intervention again.  The patient was previously evaluated by our service approximately 1 week ago at which point it was noted that she had left elbow subacute olecranon bursitis with MRSA growth.  Patient records were obtained from [facility] which did show positive MRSA wound cultures.  The patient was scheduled for irrigation debridement of the left elbow by Dr. Gu, however she refused surgical intervention at that time.  Evaluated patient this morning, she notes she is able to perform full range of motion left elbow, she still notes some pain to the left elbow bursa region to touch.  There has been some improvement to the left elbow with IV antibiotics.  The patient notes that this all began approximately 3 to 4 weeks ago.  She has uncontrolled diabetes, A1c 11% as well as history of IV drug use, she currently is using IV meth, as well as Suboxone which is prescribed to her.  Patient notes some mild pain to the left elbow.         Objective:    Vitals:    06/03/20 1700 06/04/20 0300 06/04/20 0436 06/04/20 0625   BP: 128/74 140/89  143/94   BP Location: Left arm Left arm  Left arm   Patient Position: Lying Lying  Lying   Pulse: 90 94  97   Resp: 16 18  18   Temp: 98.1 °F (36.7 °C) 96.4 °F (35.8 °C)  97.9 °F (36.6 °C)   TempSrc: Oral Oral  Oral   SpO2: 97% 93%  97%   Weight:   75.6 kg (166 lb 11.2 oz)    Height:              Physical Exam:  Constitutional: chronically ill female.  No respiratory distress.        Musculoskeletal: On examination left elbow there is erythema present with evidence of olecranon bursitis noted.  Patient can obtain full flexion extension left elbow, neurovascular intact left upper extremity with flexion extension intact to the distal digits of the left hand.  There is no diffuse  cellulitis to the left forearm.  Erythema centrally located to the olecranon bursa.  Tenderness to palpation of the olecranon bursa noted.      Labs:    Results from last 7 days   Lab Units 06/04/20 0423 06/02/20  0116 06/01/20  0715   WBC 10*3/mm3 7.32 7.33 7.93   HEMOGLOBIN g/dL 6.9* 7.5* 7.1*   HEMATOCRIT % 24.9* 26.0* 24.5*   MCV fL 88.9 86.1 86.3   MCHC g/dL 27.7* 28.8* 29.0*   PLATELETS 10*3/mm3 305 239 277         Results from last 7 days   Lab Units 06/04/20  0423 06/03/20  0956 06/03/20  0406  06/02/20  0511  06/02/20  0116 06/01/20  0715 05/31/20  0049   SODIUM mmol/L 133*  --  133*  --   --   --  129* 129* 133*   POTASSIUM mmol/L 5.3* 6.1* 6.4*   < >  --    < > 6.6* 6.0* 6.0*   MAGNESIUM mg/dL  --   --  2.4  --  1.7  --   --   --   --    CHLORIDE mmol/L 104  --  105  --   --   --  103 101 103   CO2 mmol/L 23.1  --  21.8*  --   --   --  20.0* 20.8* 22.5   BUN mg/dL 36*  --  42*  --   --   --  45* 41* 34*   CREATININE mg/dL 1.36*  --  1.39*  --   --   --  1.25* 1.23* 1.18*   EGFR IF NONAFRICN AM mL/min/1.73 43*  --  42*  --   --   --  47* 48* 50*   CALCIUM mg/dL 8.4*  --  8.2*  --   --   --  7.9* 8.2* 8.2*   GLUCOSE mg/dL 47*  --  221*  --   --   --  130* 104* 206*   ALBUMIN g/dL 2.45*  --   --   --   --   --   --  2.16* 2.20*   BILIRUBIN mg/dL <0.2*  --   --   --   --   --   --  <0.2* <0.2*   ALK PHOS U/L 157*  --   --   --   --   --   --  142* 147*   AST (SGOT) U/L 35*  --   --   --   --   --   --  28 30   ALT (SGPT) U/L 24  --   --   --   --   --   --  19 19    < > = values in this interval not displayed.   Estimated Creatinine Clearance: 57.8 mL/min (A) (by C-G formula based on SCr of 1.36 mg/dL (H)).  No results found for: AMMONIA          No results found for: HGBA1C  No results found for: TSH, FREET4      Pain Management Panel     Pain Management Panel Latest Ref Rng & Units 5/26/2020 5/25/2020    CREATININE UR mg/dL 39.3 -    AMPHETAMINES SCREEN, URINE Negative - Positive(A)    BARBITURATES SCREEN  Negative - Negative    BENZODIAZEPINE SCREEN, URINE Negative - Negative    BUPRENORPHINEUR Negative - Positive(A)    COCAINE SCREEN, URINE Negative - Negative    METHADONE SCREEN, URINE Negative - Negative          No results found for: BLOODCX  No results found for: URINECX  No results found for: WOUNDCX  No results found for: STOOLCX              Radiology:  Imaging Results (Last 72 Hours)     ** No results found for the last 72 hours. **            Assessment:   1.Left elbow olecranon bursitis  2.  History MRSA        Plan:   Dr. Rhoades will see and evaluate patient, patient's bursitis has improved some with IV antibiotics.  Patient may still require irrigation debridement of left elbow.  Dr. Rhoades will dictate further plan of care after evaluation.  Patient is agreeable if surgical intervention is required at this time.  Orthopedic service will follow the patient.        Janusz Dolan, APRN June 4, 2020 10:23

## 2020-06-04 NOTE — PLAN OF CARE
Continuing to encourage pt to follow low potassium diet. Pt did agree to take scheduled lactulose and kayexalate this shift. Educated pt on importance of following plan of care. Labs to be rechecked in AM per physician orders. Will continue to monitor.

## 2020-06-04 NOTE — PROGRESS NOTES
Nephrology Progress Note      Subjective   Pt is doing fine, no palpitations, shortness of breath or chest pain    Objective       Vital signs :     Temp:  [96.4 °F (35.8 °C)-98.6 °F (37 °C)] 97.9 °F (36.6 °C)  Heart Rate:  [82-97] 97  Resp:  [14-18] 18  BP: (103-143)/(66-94) 143/94      Intake/Output Summary (Last 24 hours) at 6/4/2020 1009  Last data filed at 6/4/2020 0926  Gross per 24 hour   Intake 1660 ml   Output 900 ml   Net 760 ml       Physical Exam:    General Appearance : not in acute distress  Lungs : clear to auscultation, respirations regular  Heart :  regular rhythm & normal rate, normal S1, S2 and no murmur, no rub  Abdomen : normal bowel sounds, no masses, no hepatomegaly, no splenomegaly, soft non-tender and no guarding  Extremities : moves extremities well, no edema, no cyanosis and no redness  Skin :  no bleeding, bruising or rash  Neurologic :   orientated to person, place, time and situation, Grossly no focal deficits  Acess :       Laboratory Data :     Albumin Albumin   Date Value Ref Range Status   06/04/2020 2.45 (L) 3.50 - 5.20 g/dL Final      Magnesium Magnesium   Date Value Ref Range Status   06/03/2020 2.4 1.6 - 2.6 mg/dL Final   06/02/2020 1.7 1.6 - 2.6 mg/dL Final          PTH               No results found for: PTH    CBC and coagulation:  Results from last 7 days   Lab Units 06/04/20  0423 06/02/20  0116 06/01/20  0715   WBC 10*3/mm3 7.32 7.33 7.93   HEMOGLOBIN g/dL 6.9* 7.5* 7.1*   HEMATOCRIT % 24.9* 26.0* 24.5*   MCV fL 88.9 86.1 86.3   MCHC g/dL 27.7* 28.8* 29.0*   PLATELETS 10*3/mm3 305 239 277     Acid/base balance:      Renal and electrolytes:  Results from last 7 days   Lab Units 06/04/20  0423 06/03/20  0956 06/03/20  0406  06/02/20  0511  06/02/20  0116 06/01/20  0715 05/31/20  0049   SODIUM mmol/L 133*  --  133*  --   --   --  129* 129* 133*   POTASSIUM mmol/L 5.3* 6.1* 6.4*   < >  --    < > 6.6* 6.0* 6.0*   MAGNESIUM mg/dL  --   --  2.4  --  1.7  --   --   --   --     CHLORIDE mmol/L 104  --  105  --   --   --  103 101 103   CO2 mmol/L 23.1  --  21.8*  --   --   --  20.0* 20.8* 22.5   BUN mg/dL 36*  --  42*  --   --   --  45* 41* 34*   CREATININE mg/dL 1.36*  --  1.39*  --   --   --  1.25* 1.23* 1.18*   EGFR IF NONAFRICN AM mL/min/1.73 43*  --  42*  --   --   --  47* 48* 50*   CALCIUM mg/dL 8.4*  --  8.2*  --   --   --  7.9* 8.2* 8.2*    < > = values in this interval not displayed.     Estimated Creatinine Clearance: 57.8 mL/min (A) (by C-G formula based on SCr of 1.36 mg/dL (H)).    Liver and pancreatic function:  Results from last 7 days   Lab Units 06/04/20 0423 06/01/20  0715 05/31/20  0049   ALBUMIN g/dL 2.45* 2.16* 2.20*   BILIRUBIN mg/dL <0.2* <0.2* <0.2*   ALK PHOS U/L 157* 142* 147*   AST (SGOT) U/L 35* 28 30   ALT (SGPT) U/L 24 19 19         Cardiac:      Liver and pancreatic function:  Results from last 7 days   Lab Units 06/04/20  0423 06/01/20  0715 05/31/20  0049   ALBUMIN g/dL 2.45* 2.16* 2.20*   BILIRUBIN mg/dL <0.2* <0.2* <0.2*   ALK PHOS U/L 157* 142* 147*   AST (SGOT) U/L 35* 28 30   ALT (SGPT) U/L 24 19 19       Medications :       bacitracin  Topical Q12H   buprenorphine-naloxone 1.25 tablet Sublingual Daily   carvedilol 6.25 mg Oral BID With Meals   cefTRIAXone 2 g Intravenous Q24H   cholecalciferol 50,000 Units Oral Q7 Days   cloNIDine 0.2 mg Oral Q12H   docusate sodium 100 mg Oral BID   enoxaparin 40 mg Subcutaneous Q24H   fludrocortisone 50 mcg Oral Daily   folic acid 1 mg Oral Daily   lactobacillus acidophilus 1 capsule Oral Daily   lactulose 30 g Oral TID   melatonin 10 mg Oral Nightly   polyethylene glycol 17 g Oral Daily   rOPINIRole 0.25 mg Oral Nightly   senna-docusate sodium 1 tablet Oral BID   sodium chloride 10 mL Intravenous Q12H   sodium chloride 10 mL Intravenous Q12H   sodium chloride 10 mL Intravenous Q12H   Vancomycin Pharmacy Intermittent Dosing  Does not apply Daily            Assessment/Plan     1. Persistent Hyperkalemia  2. Sever  constipation  3. SARINA on CKD  4. DM-II  5. Essential hypertension    Hyperkalemia much improved after starting on fludrocortisone, Cr did not improve much. Had about 2.9 G proteinuria most likely due to DKD, will follow up.      Hyperkalemia likely 2/2 sever constipation, less likely from RTA.   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

## 2020-06-04 NOTE — PROGRESS NOTES
[facility] HOSPITALIST PROGRESS NOTE     Patient Identification:  Name:  Angelique Mcdermott  Age:  39 y.o.  Sex:  female  :  1980  MRN:  [id number]  Visit Number:  [id number]  ROOM: [address] [place] [address]     Primary Care Provider:  Provider, No Known    Length of stay in inpatient status:  9    Subjective     Chief Compliant:    Chief Complaint   Patient presents with   • Abdominal Pain       History of Presenting Illness:    Patient complaining of insomnia. She voices desire for temporary nerve medications but then notes dangers with her past addictions. She denies any other new complaints. No family bedside. Continues to report large amount of stress at home.     ROS:  Otherwise 10 point ROS negative other than documented above in HPI.     Objective     Current Hospital Meds:  bacitracin  Topical Q12H   buprenorphine-naloxone 1.25 tablet Sublingual Daily   carvedilol 6.25 mg Oral BID With Meals   cefTRIAXone 2 g Intravenous Q24H   cholecalciferol 50,000 Units Oral Q7 Days   cloNIDine 0.2 mg Oral Q12H   docusate sodium 100 mg Oral BID   enoxaparin 40 mg Subcutaneous Q24H   fludrocortisone 50 mcg Oral Daily   folic acid 1 mg Oral Daily   insulin aspart 0-9 Units Subcutaneous TID AC   insulin detemir 45 Units Subcutaneous BID   lactobacillus acidophilus 1 capsule Oral Daily   lactulose 30 g Oral TID   melatonin 10 mg Oral Nightly   polyethylene glycol 17 g Oral Daily   rOPINIRole 0.25 mg Oral Nightly   senna-docusate sodium 1 tablet Oral BID   sodium chloride 10 mL Intravenous Q12H   sodium chloride 10 mL Intravenous Q12H   sodium chloride 10 mL Intravenous Q12H   sodium polystyrene 20 g Oral Q12H   vancomycin 1,000 mg Intravenous Once   Vancomycin Pharmacy Intermittent Dosing  Does not apply Daily   zolpidem 5 mg Oral Once        Current Antimicrobial Therapy:  Anti-Infectives (From admission, onward)    Ordered     Dose/Rate Route Frequency Start Stop    20 1616  vancomycin 1 g/250 mL 0.9% NS  Patient returned call to [facility].  Patient states she does not want to schedule a colonoscopy at this time.  Patient states she would like to have somebody call her back in a few months.    Bibiana Washburn/   (vial-mate)     Ordering Provider:  Chris Pichardo MD    1,000 mg  over 60 Minutes Intravenous Once 06/03/20 2200      06/02/20 1411  vancomycin 1 g/250 mL 0.9% NS (vial-mate)     Ordering Provider:  Chris Pichardo MD    1,000 mg  over 60 Minutes Intravenous Once 06/02/20 1700 06/02/20 1730    06/02/20 1411  cefTRIAXone (ROCEPHIN) 2 g/100 mL 0.9% NS VTB (KARL)     Ema Michaud, [facility] reviewed the order on 06/03/20 1041.   Ordering Provider:  Chris Pichardo MD    2 g  over 30 Minutes Intravenous Every 24 Hours 06/02/20 1600 06/05/20 1559    06/02/20 1411  Vancomycin Pharmacy Intermittent Dosing  Review   Ordering Provider:  Chris Pichardo MD     Does not apply Daily 06/02/20 1600 06/05/20 0859    05/31/20 2130  Vancomycin Pharmacy Intermittent Dosing     Ordering Provider:  Jeremiah Medina DO     Does not apply Daily 06/01/20 0900 06/02/20 0859    05/31/20 0925  vancomycin 1 g/250 mL 0.9% NS (vial-mate)     Ordering Provider:  Jeremiah Medina DO    1,000 mg  over 60 Minutes Intravenous Once 05/31/20 1100 05/31/20 1251    05/29/20 0741  vancomycin 1 g/250 mL 0.9% NS (vial-mate)     Ordering Provider:  Jeremiah Medina DO    1,000 mg  over 60 Minutes Intravenous Once 05/29/20 0900 05/29/20 1010    05/25/20 0828  cefTRIAXone (ROCEPHIN) 2 g/100 mL 0.9% NS VTB (KARL)     Ordering Provider:  Jeremiah Medina DO    2 g  over 30 Minutes Intravenous Every 24 Hours 05/26/20 0700 06/01/20 0611    05/25/20 0603  cefTRIAXone (ROCEPHIN) 2 g/100 mL 0.9% NS VTB (KARL)     Ordering Provider:  Sharon Romero PA    2 g  over 30 Minutes Intravenous Once 05/25/20 0605 05/25/20 0739        Current Diuretic Therapy:  Diuretics (From admission, onward)    None        ----------------------------------------------------------------------------------------------------------------------  Vital Signs:  Temp:  [97.4 °F (36.3 °C)-98.6 °F (37 °C)] 98.1 °F (36.7 °C)  Heart Rate:  [82-98] 90  Resp:  [14-18]  16  BP: (103-137)/(66-78) 128/74  SpO2:  [97 %-99 %] 97 %  on   ;   Device (Oxygen Therapy): room air  Body mass index is 25.59 kg/m².    Wt Readings from Last 3 Encounters:   06/03/20 74.1 kg (163 lb 6.4 oz)   10/16/18 59 kg (130 lb)     Intake & Output (last 3 days)       06/01 0701 - 06/02 0700 06/02 0701 - 06/03 0700 06/03 0701 - 06/04 0700    P.O. 1920 1660 960    I.V. (mL/kg)  889.6 (12)     IV Piggyback 1000.6 100     Total Intake(mL/kg) 2920.6 (39.7) 2649.6 (35.8) 960 (13)    Urine (mL/kg/hr) 1275 (0.7) 600 (0.3) 700 (0.7)    Stool 0      Total Output 1275 600 700    Net +1645.6 +2049.6 +260           Urine Unmeasured Occurrence 1 x 6 x     Stool Unmeasured Occurrence 1 x 3 x         Diet Soft Texture; Chopped; Consistent Carbohydrate, Low Potassium  ----------------------------------------------------------------------------------------------------------------------  Physical exam:  Constitutional:  Well-developed and well-nourished.  No respiratory distress.      HENT:  Head:  Normocephalic and atraumatic.  Mouth:  Moist mucous membranes.    Eyes:  Conjunctivae and EOM are normal. No scleral icterus.    Neck:  Neck supple.  No JVD present.    Cardiovascular:  Normal rate, regular rhythm and normal heart sounds with no murmur.  Pulmonary/Chest:  No respiratory distress, no wheezes, no crackles, with normal breath sounds and good air movement.  Abdominal:  Soft.  Bowel sounds are normal.  No distension and no tenderness.   Musculoskeletal:  L AKA, L elbow swelling and warmth. Some Passive and active ROM able to be obtained but painful per patient.   Neurological:  Alert and oriented to person, place, and time.  No cranial nerve deficit.  No tongue deviation.  No facial droop.  No slurred speech.   Skin:  Skin is warm and dry. No rash noted. No pallor.   Peripheral vascular:  Pulses in all 4 extremities with no clubbing, no cyanosis, no  edema.  ----------------------------------------------------------------------------------------------------------------------  Tele:    ----------------------------------------------------------------------------------------------------------------------  Results from last 7 days   Lab Units 06/02/20  0116 06/01/20 0715 05/31/20  0049  05/28/20  0044   CRP mg/dL  --   --   --   --  0.17   WBC 10*3/mm3 7.33 7.93 6.39   < > 5.53   HEMOGLOBIN g/dL 7.5* 7.1* 7.4*   < > 7.3*   HEMATOCRIT % 26.0* 24.5* 26.3*   < > 25.4*   MCV fL 86.1 86.3 87.7   < > 85.5   MCHC g/dL 28.8* 29.0* 28.1*   < > 28.7*   PLATELETS 10*3/mm3 239 277 270   < > 245    < > = values in this interval not displayed.         Results from last 7 days   Lab Units 06/03/20  0956 06/03/20  0406 06/02/20 2038 06/02/20  0511  06/02/20  0116 06/01/20  0715 05/31/20  0049  05/29/20  0036   SODIUM mmol/L  --  133*  --   --   --   --  129* 129* 133*   < > 132*   POTASSIUM mmol/L 6.1* 6.4* 6.0*   < >  --    < > 6.6* 6.0* 6.0*   < > 5.6*   MAGNESIUM mg/dL  --  2.4  --   --  1.7  --   --   --   --   --   --    CHLORIDE mmol/L  --  105  --   --   --   --  103 101 103   < > 105   CO2 mmol/L  --  21.8*  --   --   --   --  20.0* 20.8* 22.5   < > 19.3*   BUN mg/dL  --  42*  --   --   --   --  45* 41* 34*   < > 28*   CREATININE mg/dL  --  1.39*  --   --   --   --  1.25* 1.23* 1.18*   < > 1.27*   EGFR IF NONAFRICN AM mL/min/1.73  --  42*  --   --   --   --  47* 48* 50*   < > 46*   CALCIUM mg/dL  --  8.2*  --   --   --   --  7.9* 8.2* 8.2*   < > 7.7*   GLUCOSE mg/dL  --  221*  --   --   --   --  130* 104* 206*   < > 252*   ALBUMIN g/dL  --   --   --   --   --   --   --  2.16* 2.20*  --  2.14*   BILIRUBIN mg/dL  --   --   --   --   --   --   --  <0.2* <0.2*  --  <0.2*   ALK PHOS U/L  --   --   --   --   --   --   --  142* 147*  --  146*   AST (SGOT) U/L  --   --   --   --   --   --   --  28 30  --  31   ALT (SGPT) U/L  --   --   --   --   --   --   --  19 19  --  19    <  > = values in this interval not displayed.   Estimated Creatinine Clearance: 56 mL/min (A) (by C-G formula based on SCr of 1.39 mg/dL (H)).  No results found for: AMMONIA              Glucose   Date/Time Value Ref Range Status   06/03/2020 1638 87 70 - 130 mg/dL Final   06/03/2020 1018 128 70 - 130 mg/dL Final   06/03/2020 0604 211 (H) 70 - 130 mg/dL Final   06/02/2020 1931 119 70 - 130 mg/dL Final   06/02/2020 1550 84 70 - 130 mg/dL Final   06/02/2020 1058 76 70 - 130 mg/dL Final   06/02/2020 0627 99 70 - 130 mg/dL Final   06/01/2020 2013 79 70 - 130 mg/dL Final     No results found for: TSH, FREET4  No results found for: PREGTESTUR, PREGSERUM, HCG, HCGQUANT  Pain Management Panel     Pain Management Panel Latest Ref Rng & Units 5/26/2020 5/25/2020    CREATININE UR mg/dL 39.3 -    AMPHETAMINES SCREEN, URINE Negative - Positive(A)    BARBITURATES SCREEN Negative - Negative    BENZODIAZEPINE SCREEN, URINE Negative - Negative    BUPRENORPHINEUR Negative - Positive(A)    COCAINE SCREEN, URINE Negative - Negative    METHADONE SCREEN, URINE Negative - Negative        Brief Urine Lab Results  (Last result in the past 365 days)      Color   Clarity   Blood   Leuk Est   Nitrite   Protein   CREAT   Urine HCG        05/26/20 2338             39.3           No results found for: BLOODCX  No results found for: URINECX  No results found for: WOUNDCX  No results found for: STOOLCX  No results found for: RESPCX  No results found for: AFBCX  Results from last 7 days   Lab Units 05/28/20  0044   CRP mg/dL 0.17       I have personally looked at the labs and they are summarized above.  ----------------------------------------------------------------------------------------------------------------------  Detailed radiology reports for the last 24 hours:    Imaging Results (Last 24 Hours)     ** No results found for the last 24 hours. **        Assessment & Plan    #SARINA vs. CKD III  #Hyperkalemia 2/2 suspected type IV RTA  #HAGMA  -  Unclear Cr baseline. 0.97 om 10/18 only prior value  - Cr 1.23 this AM. Stable.   - Hyperkalemia persistent even with aggressive bowel regimen and bowel movements.   - Expressed importance for patient to comply with medications for hyperkalemia as condition is life threatening.   - Etiology of hyperkalemia being evaluated by nephrology. Given hyponatremia, hyperkalemia, low normal random cortisol, urine anion gap 33. 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 Fludrocortisone added by nephrology. Aldosterone and renin levels pending. Appreciate recs.   - K trended down today 6.6=6.1 Continue kayexalate to 30 mg BID. Will continue to closely monitor.     #UTI  - Urine culture revealed normal jefferson. On ceftriaxone as below.      #L elbow effusion w/ concern for bursitis   - Patient declined surgical evaluation. Septic bursitis on differential. Day 8/10 of abx. Continue vancomycin and rocephin. Can deescalate when medically ready for discharge. Patient continues to opt against surgery.     #Hyponatremia   - Suspect hypovolemic or related to RTA. Will give another 500 cc bolus.     #DM II  - A1C 11.1%, Diabetes educator consulted   - SSI , titrate as needed     #Mild troponin leak  - No acute ischemic changes on EKG. Normalized on repeat.     #Stable normocytic anemia     #Substance abuse   - UDS positive for amphetamines and buprenorphine. Pt is prescribed Suboxone and admits to recent methamphetamine abuse.  - Plans to continue cessation at discharge. Has psychiatry f/u scheduled for 6/11.     #Cirrhosis w/ hx of hepatitis C  - Appears compensated. Outpatient hepatology f/u     #Constipation   #N/V  - Continue to escalate bowel regimen as able. Patient declining enemas.     #Hypomagnesemia   - Replaced     F: PO  E: Replace as needed   N: CC    Code status: Full     Dispo: Pending clinical improvement      VTE Prophylaxis:   Mechanical Order History:     None      Pharmalogical Order History:     Ordered     Dose Route Frequency Stop     05/25/20 0727  enoxaparin (LOVENOX) syringe 40 mg      40 mg SC Every 24 Hours --    05/25/20 0717  Pharmacy to Dose enoxaparin (LOVENOX)  Status:  Discontinued     Question:  Indication of use  Answer:  Prophylaxis    -- XX Continuous PRN 05/25/20 0727          Chris Pichardo MD  [facility]  06/03/20  20:01

## 2020-06-04 NOTE — THERAPY TREATMENT NOTE
Acute Care - Physical Therapy Treatment Note   [place]     Patient Name: Angelique Mcdermott  : 1980  MRN: [id number]  Today's Date: 2020  Onset of Illness/Injury or Date of Surgery: 20  Date of Referral to PT: 20  Referring Physician: decreased functional mobility    Admit Date: 2020    Visit Dx:    ICD-10-CM ICD-9-CM   1. Type 2 diabetes mellitus with hyperglycemia, with long-term current use of insulin (CMS/HCC) E11.65 250.00    Z79.4 790.29     V58.67   2. Hyponatremia E87.1 276.1   3. Chronic hepatitis C without hepatic coma (CMS/HCC) B18.2 070.54   4. Methamphetamine abuse (CMS/HCC) F15.10 305.70   5. Acute renal failure, unspecified acute renal failure type (CMS/HCC) N17.9 584.9   6. Asymptomatic microscopic hematuria R31.21 599.72   7. Hypertension, unspecified type I10 401.9   8. Olecranon bursa abscess, left M71.022 727.89     Patient Active Problem List   Diagnosis   • Type 2 diabetes mellitus with hyperglycemia, with long-term current use of insulin (CMS/HCC)   • Olecranon bursa abscess, left       Therapy Treatment    Rehabilitation Treatment Summary     Row Name 20 1343             Treatment Time/Intention    Discipline  physical therapist  -CT      Document Type  therapy note (daily note)  -CT      Subjective Information  complains of;weakness;fatigue;pain  -CT      Mode of Treatment  physical therapy  -CT      Therapy Frequency (PT Clinical Impression)  3 times/wk 3-5 times/wk   -CT      Patient Effort  adequate  -CT      Comment  Pt tolerated treatment session well with rest breaks provided as needed. Pt continues to sit EOB with therapy and declines transfer or ambulation.   -CT      Existing Precautions/Restrictions  fall  -CT      Recorded by [CT] Nury Ochoa, PT 20 1346      Row Name 20 1346             Cognitive Assessment/Intervention- PT/OT    Orientation Status (Cognition)  oriented x 3  -CT      Follows Commands (Cognition)  WFL  -CT       Recorded by [CT] Nury Ochoa, PT 06/04/20 1346      Row Name 06/04/20 1343             Safety Issues, Functional Mobility    Impairments Affecting Function (Mobility)  endurance/activity tolerance;strength  -CT      Recorded by [CT] Nury Ochoa, PT 06/04/20 1346      Row Name 06/04/20 1343             Bed Mobility Assessment/Treatment    Bed Mobility Assessment/Treatment  bed mobility (all) activities  -CT      [place] Level (Bed Mobility)  moderate assist (50% patient effort)  -CT      Bed Mobility, Safety Issues  decreased use of arms for pushing/pulling;decreased use of legs for bridging/pushing  -CT      Assistive Device (Bed Mobility)  bed rails  -CT      Recorded by [CT] Nury Ochoa, PT 06/04/20 1346      Row Name 06/04/20 1343             Transfer Assessment/Treatment    Transfer Assessment/Treatment  sit-stand transfer;stand-sit transfer  -CT      Recorded by [CT] Nury Ochoa, PT 06/04/20 1346      Row Name 06/04/20 1343             Sit-Stand Transfer    Sit-Stand [place] (Transfers)  moderate assist (50% patient effort)  -CT      Assistive Device (Sit-Stand Transfers)  walker, front-wheeled  -CT      Recorded by [CT] Nury Ochoa, PT 06/04/20 1346      Row Name 06/04/20 1343             Stand-Sit Transfer    Stand-Sit [place] (Transfers)  moderate assist (50% patient effort)  -CT      Assistive Device (Stand-Sit Transfers)  walker, front-wheeled  -CT      Recorded by [CT] Nury Ochoa, PT 06/04/20 1346      Row Name 06/04/20 1343             Motor Skills Assessment/Interventions    Additional Documentation  Therapeutic Exercise (Group)  -CT      Recorded by [CT] Nury Ochoa, PT 06/04/20 1348      Row Name 06/04/20 1343             Therapeutic Exercise    Therapeutic Exercise  seated, lower extremities  -CT      Additional Documentation  Therapeutic Exercise (Row)  -CT      Recorded by [CT] Nury Ochoa, PT 06/04/20 1348      Row Name 06/04/20 1343              Lower Extremity Seated Therapeutic Exercise    Performed, Seated Lower Extremity (Therapeutic Exercise)  hip flexion/extension;LAQ (long arc quad), knee extension;ankle dorsiflexion/plantarflexion;hip abduction/adduction RLE  -CT      Recorded by [CT] Nury Ochoa, PT 06/04/20 1348      Row Name 06/04/20 1343             Positioning and Restraints    Pre-Treatment Position  in bed  -CT      Post Treatment Position  bed  -CT      In Bed  sitting EOB;call light within reach;encouraged to call for assist;notified nsg  -CT      Recorded by [CT] Nury Ochoa, PT 06/04/20 1346      Row Name                Wound 05/25/20 0845 Right anterior toe Arterial Ulcer    Wound - Properties Group Date first assessed: 05/25/20 [TW] Time first assessed: 0845 [TW] Present on Hospital Admission: Y [TW] Side: Right [TW] Orientation: anterior [TW] Location: toe [TW] Primary Wound Type: Arterial ulc [TW] Recorded by:  [TW] Stacy Cisneros RN 05/25/20 1259    Row Name                Wound 05/25/20 0845 Right anterior;distal;lower ankle Venous Ulcer    Wound - Properties Group Date first assessed: 05/25/20 [TW] Time first assessed: 0845 [TW] Present on Hospital Admission: Y [TW] Side: Right [TW] Orientation: anterior;distal;lower [TW] Location: ankle [TW], multiple areas noted open and scabbed  Primary Wound Type: Venous ulcer [TW] Recorded by:  [TW] Stacy Cisneros RN 05/25/20 1301    Row Name                Wound 05/25/20 0845 Right anterior;midline;lower leg Venous Ulcer    Wound - Properties Group Date first assessed: 05/25/20 [TW] Time first assessed: 0845 [TW] Present on Hospital Admission: Y [TW] Side: Right [TW] Orientation: anterior;midline;lower [TW] Location: leg [TW], multiple areas noted on shin that are open and/or dry  Primary Wound Type: Venous ulcer [TW] Recorded by:  [TW] Stacy Cisneros RN 05/25/20 1303    Row Name                Wound 05/25/20 0845 Right lower abdomen Other (comment)    Wound -  Properties Group Date first assessed: 05/25/20 [TW] Time first assessed: 0845 [TW] Present on Hospital Admission: Y [TW] Side: Right [TW] Orientation: lower [TW] Location: abdomen [TW] Primary Wound Type: Other [TW], one open area, one dry area noted  Recorded by:  [TW] Stacy Cisneros RN 05/25/20 1308    Row Name                Wound 05/25/20 0845 Right mid axillary Other (comment)    Wound - Properties Group Date first assessed: 05/25/20 [TW] Time first assessed: 0845 [TW] Present on Hospital Admission: Y [TW] Side: Right [TW] Location: mid axillary [TW] Primary Wound Type: Other [TW], open area noted  Recorded by:  [TW] Stacy Cisneros RN 05/25/20 1310    Row Name                Wound 05/25/20 0845 Right shoulder Other (comment)    Wound - Properties Group Date first assessed: 05/25/20 [TW] Time first assessed: 0845 [TW] Present on Hospital Admission: Y [TW] Side: Right [TW] Location: shoulder [TW], outer shoulder with open area noted  Primary Wound Type: Other [TW], wound, open  Recorded by:  [TW] Stacy Cisneros RN 05/25/20 1312    Row Name                Wound 05/25/20 0845 scalp Other (comment)    Wound - Properties Group Date first assessed: 05/25/20 [TW] Time first assessed: 0845 [TW] Present on Hospital Admission: Y [TW] Location: scalp [TW] Primary Wound Type: Other [TW], wound x 2 top of head; one with partial yellow slough  Recorded by:  [TW] Stacy Cisneros RN 05/25/20 1314    Row Name 06/04/20 1343             Plan of Care Review    Plan of Care Reviewed With  patient  -CT      Recorded by [CT] Nury Ochoa, PT 06/04/20 1346      Row Name 06/04/20 1349             Outcome Summary/Treatment Plan (PT)    Daily Summary of Progress (PT)  progress towards functional goals is fair  -CT      Anticipated Equipment Needs at Discharge (PT)  -- tbd  -CT      Anticipated Discharge Disposition (PT)  home with assist  -CT      Recorded by [CT] Nury Ochoa, PT 06/04/20 1346        User Key   (r) = Recorded By, (t) = Taken By, (c) = Cosigned By    Initials Name Effective Dates Discipline    Stacy Wagner, RN 06/16/16 -  Nurse    Nury Walls, PT 04/03/18 -  PT          Wound 05/25/20 0845 Right anterior toe Arterial Ulcer (Active)   Dressing Appearance open to air 6/3/2020  8:09 PM   Base dry;non-blanchable 6/3/2020  8:09 PM   Periwound dry;warm 6/3/2020  8:09 PM   Periwound Temperature warm 6/3/2020  8:09 PM   Periwound Skin Turgor firm 6/3/2020  8:09 PM   Drainage Amount none 6/3/2020  8:09 PM   Care, Wound cleansed with;sterile normal saline 6/3/2020  8:09 PM   Dressing Care, Wound open to air 6/3/2020  8:09 PM   Periwound Care, Wound topical treatment applied 6/3/2020  8:09 PM       Wound 05/25/20 0845 Right anterior;distal;lower ankle Venous Ulcer (Active)   Dressing Appearance dry;intact 6/3/2020  8:09 PM   Periwound dry 6/3/2020  8:09 PM   Periwound Temperature warm 6/3/2020  8:09 PM   Periwound Skin Turgor firm 6/3/2020  8:09 PM   Drainage Amount none 6/3/2020  8:09 PM   Care, Wound cleansed with;sterile normal saline 6/3/2020  8:09 PM   Dressing Care, Wound dressing changed 6/3/2020  8:09 PM   Periwound Care, Wound topical treatment applied 6/3/2020  8:09 PM       Wound 05/25/20 0845 Right anterior;midline;lower leg Venous Ulcer (Active)   Dressing Appearance dry;intact 6/3/2020  8:09 PM   Periwound dry 6/3/2020  8:09 PM   Periwound Temperature warm 6/3/2020  8:09 PM   Periwound Skin Turgor firm 6/3/2020  8:09 PM   Drainage Amount scant 6/3/2020  8:09 PM   Care, Wound cleansed with;sterile normal saline 6/3/2020  8:09 PM   Dressing Care, Wound dressing changed 6/3/2020  8:09 PM   Periwound Care, Wound topical treatment applied 6/3/2020  8:09 PM       Wound 05/25/20 0845 Right lower abdomen Other (comment) (Active)   Dressing Appearance dry;intact 6/3/2020  8:09 PM   Periwound dry 6/3/2020  8:09 PM   Periwound Temperature warm 6/3/2020  8:09 PM   Periwound Skin Turgor firm 6/3/2020   8:09 PM   Edges irregular 6/3/2020  8:09 PM   Drainage Amount none 6/3/2020  8:09 PM   Care, Wound cleansed with;sterile normal saline 6/3/2020  8:09 PM   Dressing Care, Wound dressing changed 6/3/2020  8:09 PM   Periwound Care, Wound topical treatment applied 6/3/2020  8:09 PM       Wound 05/25/20 0845 Right mid axillary Other (comment) (Active)   Dressing Appearance open to air 6/3/2020  8:09 PM   Base pink;dry;clean 6/3/2020  8:09 PM   Periwound pink;intact 6/3/2020  8:09 PM   Periwound Temperature warm 6/3/2020  8:09 PM   Periwound Skin Turgor soft 6/3/2020  8:09 PM   Drainage Amount none 6/3/2020  8:09 PM   Care, Wound cleansed with;sterile normal saline 6/3/2020  8:09 PM   Dressing Care, Wound open to air 6/3/2020  8:09 PM   Periwound Care, Wound topical treatment applied 6/3/2020  8:09 PM       Wound 05/25/20 0845 Right shoulder Other (comment) (Active)   Dressing Appearance dry;intact 6/3/2020  8:09 PM   Base dry;scab 6/3/2020  8:09 PM   Periwound dry 6/3/2020  8:09 PM   Periwound Temperature warm 6/3/2020  8:09 PM   Periwound Skin Turgor firm 6/3/2020  8:09 PM   Drainage Amount none 6/3/2020  8:09 PM   Care, Wound cleansed with;sterile normal saline 6/3/2020  8:09 PM   Dressing Care, Wound open to air 6/3/2020  8:09 PM   Periwound Care, Wound topical treatment applied 6/3/2020  8:09 PM       Wound 05/25/20 0845 scalp Other (comment) (Active)   Dressing Appearance open to air 6/3/2020  8:09 PM   Closure Open to air 6/3/2020  8:09 PM   Base dry;scab 6/3/2020  8:09 PM   Periwound dry 6/3/2020  8:09 PM   Periwound Temperature warm 6/3/2020  8:09 PM   Dressing Care, Wound open to air 6/3/2020  8:09 PM   Periwound Care, Wound topical treatment applied 6/3/2020  8:09 PM           Physical Therapy Education                 Title: PT OT SLP Therapies (Done)     Topic: Physical Therapy (Done)     Point: Mobility training (Done)     Description:   Instruct learner(s) on safety and technique for assisting patient out  of bed, chair or wheelchair.  Instruct in the proper use of assistive devices, such as walker, crutches, cane or brace.              Patient Friendly Description:   It's important to get you on your feet again, but we need to do so in a way that is safe for you. Falling has serious consequences, and your personal safety is the most important thing of all.        When it's time to get out of bed, one of us or a family member will sit next to you on the bed to give you support.     If your doctor or nurse tells you to use a walker, crutches, a cane, or a brace, be sure you use it every time you get out of bed, even if you think you don't need it.    Learning Progress Summary           Patient Acceptance, E,TB, VU by CT at 6/4/2020 1346    Acceptance, E, VU by KB at 6/3/2020 1326    Acceptance, E,TB, VU by CT at 6/2/2020 1413                   Point: Home exercise program (Done)     Description:   Instruct learner(s) on appropriate technique for monitoring, assisting and/or progressing patient with therapeutic exercises and activities.              Learning Progress Summary           Patient Acceptance, E,TB, VU by CT at 6/4/2020 1346    Acceptance, E, VU by KB at 6/3/2020 1326    Acceptance, E,TB, VU by CT at 6/2/2020 1413                   Point: Body mechanics (Done)     Description:   Instruct learner(s) on proper positioning and spine alignment for patient and/or caregiver during mobility tasks and/or exercises.              Learning Progress Summary           Patient Acceptance, E,TB, VU by CT at 6/4/2020 1346    Acceptance, E, VU by KB at 6/3/2020 1326    Acceptance, E,TB, VU by CT at 6/2/2020 1413                   Point: Precautions (Done)     Description:   Instruct learner(s) on prescribed precautions during mobility and gait tasks              Learning Progress Summary           Patient Acceptance, E,TB, VU by CT at 6/4/2020 1346    Acceptance, E, VU by KB at 6/3/2020 1326    Acceptance, E,TB, VU by CT at  6/2/2020 1413                               User Key     Initials Effective Dates Name Provider Type Discipline    CT 04/03/18 -  Nury Ochoa, ALBERTA Physical Therapist PT    KB 07/25/18 -  Ml Hung, RN Registered Nurse Nurse                PT Recommendation and Plan  Anticipated Discharge Disposition (PT): home with assist  Planned Therapy Interventions (PT Eval): balance training, bed mobility training, gait training, home exercise program, manual therapy techniques, motor coordination training, neuromuscular re-education, patient/family education, postural re-education, strengthening, transfer training  Therapy Frequency (PT Clinical Impression): 3 times/wk(3-5 times/wk )  Outcome Summary/Treatment Plan (PT)  Daily Summary of Progress (PT): progress towards functional goals is fair  Anticipated Equipment Needs at Discharge (PT): (tbd)  Anticipated Discharge Disposition (PT): home with assist  Plan of Care Reviewed With: patient     Time Calculation:   PT Charges     Row Name 06/04/20 1346             Time Calculation    PT Received On  06/04/20  -CT      PT Goal Re-Cert Due Date  06/16/20  -CT         Time Calculation- PT    Total Timed Code Minutes- PT  26 minute(s)  -CT        User Key  (r) = Recorded By, (t) = Taken By, (c) = Cosigned By    Initials Name Provider Type    CT Nury Ochoa, ALBERTA Physical Therapist        Therapy Charges for Today     Code Description Service Date Service Provider Modifiers Qty    56989288525 HC PT THER PROC EA 15 MIN 6/4/2020 Nury Ochoa, PT GP 1    91414699794 HC PT THERAPEUTIC ACT EA 15 MIN 6/4/2020 Nury Ochoa, PT GP 1               Nury Ochoa PT  6/4/2020

## 2020-06-04 NOTE — PLAN OF CARE
Problem: Patient Care Overview  Goal: Plan of Care Review  Outcome: Ongoing (interventions implemented as appropriate)  Flowsheets  Taken 6/4/2020 1841 by Ml Hung, RN  Progress: improving  Outcome Summary: Patient has improved today with potassium decreasing, pt has had a BM today. Pt's glucose was elevated around lunch time see orders per Dr. Pichardo. Pt denies any pain at this time, but has been having intermittent nausea throughout the day requiring medication.  Taken 6/4/2020 1343 by Nury Ochoa PT  Plan of Care Reviewed With: patient

## 2020-06-05 LAB
ANION GAP SERPL CALCULATED.3IONS-SCNC: 7.7 MMOL/L (ref 5–15)
ANISOCYTOSIS BLD QL: NORMAL
BASOPHILS # BLD AUTO: 0.08 10*3/MM3 (ref 0–0.2)
BASOPHILS NFR BLD AUTO: 1.2 % (ref 0–1.5)
BUN BLD-MCNC: 34 MG/DL (ref 6–20)
BUN/CREAT SERPL: 26.6 (ref 7–25)
C3 SERPL-MCNC: 130 MG/DL (ref 82–167)
C4 SERPL-MCNC: 15 MG/DL (ref 14–44)
CALCIUM SPEC-SCNC: 8.1 MG/DL (ref 8.6–10.5)
CHLORIDE SERPL-SCNC: 102 MMOL/L (ref 98–107)
CO2 SERPL-SCNC: 22.3 MMOL/L (ref 22–29)
CREAT BLD-MCNC: 1.28 MG/DL (ref 0.57–1)
DEPRECATED RDW RBC AUTO: 62 FL (ref 37–54)
EOSINOPHIL # BLD AUTO: 0.35 10*3/MM3 (ref 0–0.4)
EOSINOPHIL NFR BLD AUTO: 5.1 % (ref 0.3–6.2)
ERYTHROCYTE [DISTWIDTH] IN BLOOD BY AUTOMATED COUNT: 19.5 % (ref 12.3–15.4)
GFR SERPL CREATININE-BSD FRML MDRD: 46 ML/MIN/1.73
GLUCOSE BLD-MCNC: 337 MG/DL (ref 65–99)
GLUCOSE BLDC GLUCOMTR-MCNC: 221 MG/DL (ref 70–130)
GLUCOSE BLDC GLUCOMTR-MCNC: 226 MG/DL (ref 70–130)
GLUCOSE BLDC GLUCOMTR-MCNC: 251 MG/DL (ref 70–130)
GLUCOSE BLDC GLUCOMTR-MCNC: 281 MG/DL (ref 70–130)
GLUCOSE BLDC GLUCOMTR-MCNC: 301 MG/DL (ref 70–130)
HCT VFR BLD AUTO: 24 % (ref 34–46.6)
HGB BLD-MCNC: 6.9 G/DL (ref 12–15.9)
HIV1+2 AB SER QL: NORMAL
HYPOCHROMIA BLD QL: NORMAL
IMM GRANULOCYTES # BLD AUTO: 0.03 10*3/MM3 (ref 0–0.05)
IMM GRANULOCYTES NFR BLD AUTO: 0.4 % (ref 0–0.5)
LYMPHOCYTES # BLD AUTO: 2.24 10*3/MM3 (ref 0.7–3.1)
LYMPHOCYTES NFR BLD AUTO: 32.4 % (ref 19.6–45.3)
MCH RBC QN AUTO: 25.1 PG (ref 26.6–33)
MCHC RBC AUTO-ENTMCNC: 28.8 G/DL (ref 31.5–35.7)
MCV RBC AUTO: 87.3 FL (ref 79–97)
MONOCYTES # BLD AUTO: 0.6 10*3/MM3 (ref 0.1–0.9)
MONOCYTES NFR BLD AUTO: 8.7 % (ref 5–12)
NEUTROPHILS # BLD AUTO: 3.61 10*3/MM3 (ref 1.7–7)
NEUTROPHILS NFR BLD AUTO: 52.2 % (ref 42.7–76)
NRBC BLD AUTO-RTO: 0 /100 WBC (ref 0–0.2)
PLAT MORPH BLD: NORMAL
PLATELET # BLD AUTO: 285 10*3/MM3 (ref 140–450)
PMV BLD AUTO: 10.1 FL (ref 6–12)
POTASSIUM BLD-SCNC: 5.6 MMOL/L (ref 3.5–5.2)
RBC # BLD AUTO: 2.75 10*6/MM3 (ref 3.77–5.28)
SODIUM BLD-SCNC: 132 MMOL/L (ref 136–145)
VANCOMYCIN SERPL-MCNC: 18.7 MCG/ML (ref 5–40)
WBC NRBC COR # BLD: 6.91 10*3/MM3 (ref 3.4–10.8)

## 2020-06-05 PROCEDURE — 83520 IMMUNOASSAY QUANT NOS NONAB: CPT | Performed by: INTERNAL MEDICINE

## 2020-06-05 PROCEDURE — 86705 HEP B CORE ANTIBODY IGM: CPT | Performed by: INTERNAL MEDICINE

## 2020-06-05 PROCEDURE — 86160 COMPLEMENT ANTIGEN: CPT | Performed by: INTERNAL MEDICINE

## 2020-06-05 PROCEDURE — 84155 ASSAY OF PROTEIN SERUM: CPT | Performed by: INTERNAL MEDICINE

## 2020-06-05 PROCEDURE — 63710000001 INSULIN ASPART PER 5 UNITS: Performed by: INTERNAL MEDICINE

## 2020-06-05 PROCEDURE — 85007 BL SMEAR W/DIFF WBC COUNT: CPT | Performed by: INTERNAL MEDICINE

## 2020-06-05 PROCEDURE — 86707 HEPATITIS BE ANTIBODY: CPT | Performed by: INTERNAL MEDICINE

## 2020-06-05 PROCEDURE — 80048 BASIC METABOLIC PNL TOTAL CA: CPT | Performed by: INTERNAL MEDICINE

## 2020-06-05 PROCEDURE — 86706 HEP B SURFACE ANTIBODY: CPT | Performed by: INTERNAL MEDICINE

## 2020-06-05 PROCEDURE — 25010000002 VANCOMYCIN 1 G RECONSTITUTED SOLUTION: Performed by: INTERNAL MEDICINE

## 2020-06-05 PROCEDURE — 82962 GLUCOSE BLOOD TEST: CPT

## 2020-06-05 PROCEDURE — 86038 ANTINUCLEAR ANTIBODIES: CPT | Performed by: INTERNAL MEDICINE

## 2020-06-05 PROCEDURE — 84165 PROTEIN E-PHORESIS SERUM: CPT | Performed by: INTERNAL MEDICINE

## 2020-06-05 PROCEDURE — 80202 ASSAY OF VANCOMYCIN: CPT

## 2020-06-05 PROCEDURE — 99233 SBSQ HOSP IP/OBS HIGH 50: CPT | Performed by: INTERNAL MEDICINE

## 2020-06-05 PROCEDURE — 25010000002 ENOXAPARIN PER 10 MG: Performed by: INTERNAL MEDICINE

## 2020-06-05 PROCEDURE — 25010000002 PROMETHAZINE PER 50 MG: Performed by: INTERNAL MEDICINE

## 2020-06-05 PROCEDURE — 86256 FLUORESCENT ANTIBODY TITER: CPT | Performed by: INTERNAL MEDICINE

## 2020-06-05 PROCEDURE — 25010000002 ONDANSETRON PER 1 MG: Performed by: INTERNAL MEDICINE

## 2020-06-05 PROCEDURE — 87350 HEPATITIS BE AG IA: CPT | Performed by: INTERNAL MEDICINE

## 2020-06-05 PROCEDURE — 87340 HEPATITIS B SURFACE AG IA: CPT | Performed by: INTERNAL MEDICINE

## 2020-06-05 PROCEDURE — 25010000002 CEFTRIAXONE PER 250 MG: Performed by: INTERNAL MEDICINE

## 2020-06-05 PROCEDURE — 86803 HEPATITIS C AB TEST: CPT | Performed by: INTERNAL MEDICINE

## 2020-06-05 PROCEDURE — 86704 HEP B CORE ANTIBODY TOTAL: CPT | Performed by: INTERNAL MEDICINE

## 2020-06-05 PROCEDURE — G0432 EIA HIV-1/HIV-2 SCREEN: HCPCS | Performed by: INTERNAL MEDICINE

## 2020-06-05 PROCEDURE — 85025 COMPLETE CBC W/AUTO DIFF WBC: CPT | Performed by: INTERNAL MEDICINE

## 2020-06-05 RX ORDER — SODIUM POLYSTYRENE SULFONATE 4.1 MEQ/G
15 POWDER, FOR SUSPENSION ORAL; RECTAL ONCE
Status: COMPLETED | OUTPATIENT
Start: 2020-06-05 | End: 2020-06-05

## 2020-06-05 RX ORDER — LACTULOSE 10 G/15ML
40 SOLUTION ORAL ONCE
Status: DISCONTINUED | OUTPATIENT
Start: 2020-06-05 | End: 2020-06-15 | Stop reason: HOSPADM

## 2020-06-05 RX ADMIN — INSULIN ASPART 3 UNITS: 100 INJECTION, SOLUTION INTRAVENOUS; SUBCUTANEOUS at 17:17

## 2020-06-05 RX ADMIN — INSULIN ASPART 3 UNITS: 100 INJECTION, SOLUTION INTRAVENOUS; SUBCUTANEOUS at 13:55

## 2020-06-05 RX ADMIN — PROMETHAZINE HYDROCHLORIDE 12.5 MG: 25 INJECTION INTRAMUSCULAR; INTRAVENOUS at 13:52

## 2020-06-05 RX ADMIN — Medication 10 MG: at 20:42

## 2020-06-05 RX ADMIN — CLONIDINE HYDROCHLORIDE 0.2 MG: 0.2 TABLET ORAL at 13:57

## 2020-06-05 RX ADMIN — CARVEDILOL 6.25 MG: 6.25 TABLET, FILM COATED ORAL at 13:51

## 2020-06-05 RX ADMIN — ONDANSETRON 4 MG: 2 INJECTION INTRAMUSCULAR; INTRAVENOUS at 17:39

## 2020-06-05 RX ADMIN — LACTULOSE 30 G: 10 SOLUTION ORAL at 17:17

## 2020-06-05 RX ADMIN — SODIUM CHLORIDE, PRESERVATIVE FREE 10 ML: 5 INJECTION INTRAVENOUS at 20:42

## 2020-06-05 RX ADMIN — CLONIDINE HYDROCHLORIDE 0.2 MG: 0.2 TABLET ORAL at 20:42

## 2020-06-05 RX ADMIN — ONDANSETRON 4 MG: 2 INJECTION INTRAMUSCULAR; INTRAVENOUS at 10:23

## 2020-06-05 RX ADMIN — ROPINIROLE HYDROCHLORIDE 0.25 MG: 0.25 TABLET, FILM COATED ORAL at 20:42

## 2020-06-05 RX ADMIN — SODIUM CHLORIDE, PRESERVATIVE FREE 10 ML: 5 INJECTION INTRAVENOUS at 10:23

## 2020-06-05 RX ADMIN — ENOXAPARIN SODIUM 40 MG: 40 INJECTION SUBCUTANEOUS at 13:49

## 2020-06-05 RX ADMIN — FLUDROCORTISONE ACETATE 50 MCG: 0.1 TABLET ORAL at 13:48

## 2020-06-05 RX ADMIN — ONDANSETRON 4 MG: 2 INJECTION INTRAMUSCULAR; INTRAVENOUS at 01:44

## 2020-06-05 RX ADMIN — TIZANIDINE 4 MG: 4 TABLET ORAL at 17:38

## 2020-06-05 RX ADMIN — BACITRACIN: 500 OINTMENT TOPICAL at 21:52

## 2020-06-05 RX ADMIN — PROMETHAZINE HYDROCHLORIDE 12.5 MG: 25 INJECTION INTRAMUSCULAR; INTRAVENOUS at 22:04

## 2020-06-05 RX ADMIN — LACTULOSE 30 G: 10 SOLUTION ORAL at 13:53

## 2020-06-05 RX ADMIN — Medication 1 CAPSULE: at 13:52

## 2020-06-05 RX ADMIN — BUPRENORPHINE HYDROCHLORIDE AND NALOXONE HYDROCHLORIDE 1.25 TABLET: 8; 2 TABLET SUBLINGUAL at 13:50

## 2020-06-05 RX ADMIN — VANCOMYCIN HYDROCHLORIDE 1000 MG: 1 INJECTION, POWDER, LYOPHILIZED, FOR SOLUTION INTRAVENOUS at 20:42

## 2020-06-05 RX ADMIN — SODIUM POLYSTYRENE SULFONATE 15 G: 1 POWDER ORAL; RECTAL at 17:16

## 2020-06-05 RX ADMIN — CARVEDILOL 6.25 MG: 6.25 TABLET, FILM COATED ORAL at 18:42

## 2020-06-05 RX ADMIN — LACTULOSE 30 G: 10 SOLUTION ORAL at 20:41

## 2020-06-05 RX ADMIN — BACITRACIN: 500 OINTMENT TOPICAL at 13:56

## 2020-06-05 RX ADMIN — SODIUM CHLORIDE 2 G: 900 INJECTION INTRAVENOUS at 18:43

## 2020-06-05 RX ADMIN — PROMETHAZINE HYDROCHLORIDE 12.5 MG: 25 INJECTION INTRAMUSCULAR; INTRAVENOUS at 04:59

## 2020-06-05 NOTE — PROGRESS NOTES
Inpatient Progress Note  Angelique Mcdermott  Date: 06/05/20  MRN: [id number]      Subjective:  Orthopedic surgery following left olecranon bursitis.  Patient previously refused surgical intervention.    Patient feels much better, is continuing to improve in regards to the left elbow, notes left elbow has no loss of range of motion.  Notes minimal pain to the left elbow. no leukocytosis noted.  History of MRSA of the left elbow.  Dr. stewart examined the elbow this morning, allowed patient to eat secondary to continued improvement in left elbow pain and effusion        Objective:    Vitals:    06/05/20 0242 06/05/20 0516 06/05/20 0720 06/05/20 1047   BP: 112/67  138/88 131/86   BP Location: Left arm  Left arm Left arm   Patient Position: Lying  Lying Lying   Pulse: 90  94 91   Resp: 20 18 18   Temp: 98.6 °F (37 °C)  98.5 °F (36.9 °C) 98.6 °F (37 °C)   TempSrc: Oral  Oral Oral   SpO2: 95%  94% 94%   Weight:  76 kg (167 lb 8 oz)     Height:              Physical Exam:  Constitutional:  Well-developed and well-nourished.  No respiratory distress.        Musculoskeletal: Full range of motion to the left elbow is noted.  There is some erythema residual to the olecranon bursa.  Neurovascularly intact left upper extremity, flexion-extension intact at the distal digits left hand.  Patient can obtain full flexion, 0 degrees extension left elbow.  Patient is minimally tender to palpation of the left elbow.  Some soft tissue swelling is residual at the left elbow.      Labs:    Results from last 7 days   Lab Units 06/05/20  0324 06/04/20  0423 06/02/20  0116   WBC 10*3/mm3 6.91 7.32 7.33   HEMOGLOBIN g/dL 6.9* 6.9* 7.5*   HEMATOCRIT % 24.0* 24.9* 26.0*   MCV fL 87.3 88.9 86.1   MCHC g/dL 28.8* 27.7* 28.8*   PLATELETS 10*3/mm3 285 305 239         Results from last 7 days   Lab Units 06/05/20  0324 06/04/20  0423 06/03/20  0956 06/03/20  0406  06/02/20  0511  06/01/20  0715 05/31/20  0049   SODIUM mmol/L 132* 133*  --  133*  --    --    < > 129* 133*   POTASSIUM mmol/L 5.6* 5.3* 6.1* 6.4*   < >  --    < > 6.0* 6.0*   MAGNESIUM mg/dL  --   --   --  2.4  --  1.7  --   --   --    CHLORIDE mmol/L 102 104  --  105  --   --    < > 101 103   CO2 mmol/L 22.3 23.1  --  21.8*  --   --    < > 20.8* 22.5   BUN mg/dL 34* 36*  --  42*  --   --    < > 41* 34*   CREATININE mg/dL 1.28* 1.36*  --  1.39*  --   --    < > 1.23* 1.18*   EGFR IF NONAFRICN AM mL/min/1.73 46* 43*  --  42*  --   --    < > 48* 50*   CALCIUM mg/dL 8.1* 8.4*  --  8.2*  --   --    < > 8.2* 8.2*   GLUCOSE mg/dL 337* 47*  --  221*  --   --    < > 104* 206*   ALBUMIN g/dL  --  2.45*  --   --   --   --   --  2.16* 2.20*   BILIRUBIN mg/dL  --  <0.2*  --   --   --   --   --  <0.2* <0.2*   ALK PHOS U/L  --  157*  --   --   --   --   --  142* 147*   AST (SGOT) U/L  --  35*  --   --   --   --   --  28 30   ALT (SGPT) U/L  --  24  --   --   --   --   --  19 19    < > = values in this interval not displayed.   Estimated Creatinine Clearance: 61.5 mL/min (A) (by C-G formula based on SCr of 1.28 mg/dL (H)).  No results found for: AMMONIA          No results found for: HGBA1C  No results found for: TSH, FREET4      Pain Management Panel     Pain Management Panel Latest Ref Rng & Units 6/4/2020 5/26/2020    CREATININE UR mg/dL 13.4 39.3    AMPHETAMINES SCREEN, URINE Negative - -    BARBITURATES SCREEN Negative - -    BENZODIAZEPINE SCREEN, URINE Negative - -    BUPRENORPHINEUR Negative - -    COCAINE SCREEN, URINE Negative - -    METHADONE SCREEN, URINE Negative - -          No results found for: BLOODCX  No results found for: URINECX  No results found for: WOUNDCX  No results found for: STOOLCX              Radiology:  Imaging Results (Last 72 Hours)     ** No results found for the last 72 hours. **            Assessment:   1.Left elbow olecranon bursitis  2.  History MRSA          Plan:   The left elbow bursitis does appear to continue to improve with IV antibiotics.  Ortho will continue to follow.   Dr. Rhoades to determine if patient needs irrigation  And debridement.  There is still some residual erythema as well as soft tissue swelling to the left elbow, although improved from last week.  Patient does have history of MRSA.  Will notify Dr. Pichardo if Dr. Rhoades feels patient needs surgical intervention.        Janusz Dolan, APRN June 5, 2020 15:09

## 2020-06-05 NOTE — PROGRESS NOTES
[facility] HOSPITALIST PROGRESS NOTE     Patient Identification:  Name:  Angelique Mcdermott  Age:  39 y.o.  Sex:  female  :  1980  MRN:  [id number]  Visit Number:  [id number]  ROOM: [address] [place] [address]     Primary Care Provider:  Provider, No Known    Length of stay in inpatient status:  10    Subjective     Chief Compliant:    Chief Complaint   Patient presents with   • Abdominal Pain       History of Presenting Illness:    Patient reports resting much better last night. She denies any new complaints. No family bedside. Continues to report being stressed.     ROS:  Otherwise 10 point ROS negative other than documented above in HPI.     Objective     Current Hospital Meds:  bacitracin  Topical Q12H   buprenorphine-naloxone 1.25 tablet Sublingual Daily   carvedilol 6.25 mg Oral BID With Meals   cholecalciferol 50,000 Units Oral Q7 Days   cloNIDine 0.2 mg Oral Q12H   docusate sodium 100 mg Oral BID   enoxaparin 40 mg Subcutaneous Q24H   fludrocortisone 50 mcg Oral Daily   folic acid 1 mg Oral Daily   insulin aspart 0-7 Units Subcutaneous TID AC   lactobacillus acidophilus 1 capsule Oral Daily   lactulose 30 g Oral TID   melatonin 10 mg Oral Nightly   polyethylene glycol 17 g Oral Daily   rOPINIRole 0.25 mg Oral Nightly   senna-docusate sodium 1 tablet Oral BID   sodium chloride 10 mL Intravenous Q12H   sodium chloride 10 mL Intravenous Q12H   sodium chloride 10 mL Intravenous Q12H   Vancomycin Pharmacy Intermittent Dosing  Does not apply Daily        Current Antimicrobial Therapy:  Anti-Infectives (From admission, onward)    Ordered     Dose/Rate Route Frequency Start Stop    20 1616  vancomycin 1 g/250 mL 0.9% NS (vial-mate)     Ordering Provider:  Chris Pichardo MD    1,000 mg  over 60 Minutes Intravenous Once 20 2200 20 2110    20 1411  vancomycin 1 g/250 mL 0.9% NS (vial-mate)     Ordering Provider:  Chris Pichardo MD    1,000 mg  over 60 Minutes Intravenous Once 20 1700  20 1730    20 1411  cefTRIAXone (ROCEPHIN) 2 g/100 mL 0.9% NS VTB (KARL)     Ema Michaud, [facility] reviewed the order on 20 1041.   Ordering Provider:  Chris Pichardo MD    2 g  over 30 Minutes Intravenous Every 24 Hours 20 1600 20 1845    20 1411  Vancomycin Pharmacy Intermittent Dosing  Let    Ordering Provider:  Chris Pichardo MD     Does not apply Daily 20 1600 20 0859    20 2130  Vancomycin Pharmacy Intermittent Dosing     Ordering Provider:  Jeremiah Medina DO     Does not apply Daily 20 0900 20 0859    20 0925  vancomycin 1 g/250 mL 0.9% NS (vial-mate)     Ordering Provider:  Jeremiah Medina DO    1,000 mg  over 60 Minutes Intravenous Once 20 1100 20 1251    20 0741  vancomycin 1 g/250 mL 0.9% NS (vial-mate)     Ordering Provider:  Jeremiah Medina DO    1,000 mg  over 60 Minutes Intravenous Once 20 0900 20 1010    20 0828  cefTRIAXone (ROCEPHIN) 2 g/100 mL 0.9% NS VTB (KARL)     Ordering Provider:  Jeremiah Medina DO    2 g  over 30 Minutes Intravenous Every 24 Hours 20 0700 20 0611    20 0603  cefTRIAXone (ROCEPHIN) 2 g/100 mL 0.9% NS VTB (KARL)     Ordering Provider:  Sharon Romero PA    2 g  over 30 Minutes Intravenous Once 20 0605 20 0739        Current Diuretic Therapy:  Diuretics (From admission, onward)    None        ----------------------------------------------------------------------------------------------------------------------  Vital Signs:  Temp:  [96.4 °F (35.8 °C)-97.9 °F (36.6 °C)] 96.8 °F (36 °C)  Heart Rate:  [] 91  Resp:  [18] 18  BP: (129-143)/(81-94) 139/84  SpO2:  [93 %-98 %] 96 %  on   ;   Device (Oxygen Therapy): room air  Body mass index is 26.11 kg/m².    Wt Readings from Last 3 Encounters:   20 75.6 kg (166 lb 11.2 oz)   10/16/18 59 kg (130 lb)     Intake & Output (last 3 days)        06/02 0701 - 06/03 0700 06/03 0701 - 06/04 0700 06/04 0701 - 06/05 0700    P.O. 1660 1560 2040    I.V. (mL/kg) 889.6 (12)      IV Piggyback 100 100     Total Intake(mL/kg) 2649.6 (35.8) 1660 (22) 2040 (27)    Urine (mL/kg/hr) 600 (0.3) 1300 (0.7)     Stool  0     Total Output 600 1300     Net +2049.6 +360 +2040           Urine Unmeasured Occurrence 6 x 3 x 2 x    Stool Unmeasured Occurrence 3 x 5 x 1 x        Diet Soft Texture; Chopped; Consistent Carbohydrate, Low Potassium  ----------------------------------------------------------------------------------------------------------------------  Physical exam:  Constitutional:  Well-developed and well-nourished.  No respiratory distress.      HENT:  Head:  Normocephalic and atraumatic.  Mouth:  Moist mucous membranes.    Eyes:  Conjunctivae and EOM are normal. No scleral icterus.    Neck:  Neck supple.  No JVD present.    Cardiovascular:  Normal rate, regular rhythm and normal heart sounds with no murmur.  Pulmonary/Chest:  No respiratory distress, no wheezes, no crackles, with normal breath sounds and good air movement.  Abdominal:  Soft.  Bowel sounds are normal.  No distension and no tenderness.   Musculoskeletal:  Left elbow still swollen but improved. L AKA.     Neurological:  Alert and oriented to person, place, and time.  No cranial nerve deficit.  No tongue deviation.  No facial droop.  No slurred speech.   Skin:  Skin is warm and dry. No rash noted. No pallor.   Peripheral vascular:  Pulses in all 4 extremities with no clubbing, no cyanosis, no edema.  ----------------------------------------------------------------------------------------------------------------------  Tele:    ----------------------------------------------------------------------------------------------------------------------  Results from last 7 days   Lab Units 06/04/20  0423 06/02/20  0116 06/01/20  0715   WBC 10*3/mm3 7.32 7.33 7.93   HEMOGLOBIN g/dL 6.9* 7.5* 7.1*   HEMATOCRIT % 24.9*  26.0* 24.5*   MCV fL 88.9 86.1 86.3   MCHC g/dL 27.7* 28.8* 29.0*   PLATELETS 10*3/mm3 305 239 277         Results from last 7 days   Lab Units 06/04/20  0423 06/03/20  0956 06/03/20  0406  06/02/20  0511  06/02/20  0116 06/01/20  0715 05/31/20  0049   SODIUM mmol/L 133*  --  133*  --   --   --  129* 129* 133*   POTASSIUM mmol/L 5.3* 6.1* 6.4*   < >  --    < > 6.6* 6.0* 6.0*   MAGNESIUM mg/dL  --   --  2.4  --  1.7  --   --   --   --    CHLORIDE mmol/L 104  --  105  --   --   --  103 101 103   CO2 mmol/L 23.1  --  21.8*  --   --   --  20.0* 20.8* 22.5   BUN mg/dL 36*  --  42*  --   --   --  45* 41* 34*   CREATININE mg/dL 1.36*  --  1.39*  --   --   --  1.25* 1.23* 1.18*   EGFR IF NONAFRICN AM mL/min/1.73 43*  --  42*  --   --   --  47* 48* 50*   CALCIUM mg/dL 8.4*  --  8.2*  --   --   --  7.9* 8.2* 8.2*   GLUCOSE mg/dL 47*  --  221*  --   --   --  130* 104* 206*   ALBUMIN g/dL 2.45*  --   --   --   --   --   --  2.16* 2.20*   BILIRUBIN mg/dL <0.2*  --   --   --   --   --   --  <0.2* <0.2*   ALK PHOS U/L 157*  --   --   --   --   --   --  142* 147*   AST (SGOT) U/L 35*  --   --   --   --   --   --  28 30   ALT (SGPT) U/L 24  --   --   --   --   --   --  19 19    < > = values in this interval not displayed.   Estimated Creatinine Clearance: 57.8 mL/min (A) (by C-G formula based on SCr of 1.36 mg/dL (H)).  No results found for: AMMONIA              Glucose   Date/Time Value Ref Range Status   06/04/2020 1935 196 (H) 70 - 130 mg/dL Final   06/04/2020 1632 217 (H) 70 - 130 mg/dL Final   06/04/2020 1113 292 (H) 70 - 130 mg/dL Final   06/04/2020 0915 169 (H) 70 - 130 mg/dL Final   06/04/2020 0628 68 (L) 70 - 130 mg/dL Final   06/04/2020 0540 121 70 - 130 mg/dL Final   06/04/2020 0523 44 (C) 70 - 130 mg/dL Final   06/04/2020 0024 102 70 - 130 mg/dL Final     No results found for: TSH, FREET4  No results found for: PREGTESTUR, PREGSERUM, HCG, HCGQUANT  Pain Management Panel     Pain Management Panel Latest Ref Rng & Units  6/4/2020 5/26/2020    CREATININE UR mg/dL 13.4 39.3    AMPHETAMINES SCREEN, URINE Negative - -    BARBITURATES SCREEN Negative - -    BENZODIAZEPINE SCREEN, URINE Negative - -    BUPRENORPHINEUR Negative - -    COCAINE SCREEN, URINE Negative - -    METHADONE SCREEN, URINE Negative - -        Brief Urine Lab Results  (Last result in the past 365 days)      Color   Clarity   Blood   Leuk Est   Nitrite   Protein   CREAT   Urine HCG        06/04/20 1112             13.4           No results found for: BLOODCX  No results found for: URINECX  No results found for: WOUNDCX  No results found for: STOOLCX  No results found for: RESPCX  No results found for: AFBCX        I have personally looked at the labs and they are summarized above.  ----------------------------------------------------------------------------------------------------------------------  Detailed radiology reports for the last 24 hours:    Imaging Results (Last 24 Hours)     ** No results found for the last 24 hours. **        Assessment & Plan    #SARINA vs. 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 0.97 om 10/18 only prior value  - Cr 1.36 this AM. Slightly bumped.   - Na stable at 133  - Hyperkalemia persistent even with aggressive bowel regimen and bowel movements.   - Expressed importance for patient to comply with medications for hyperkalemia as condition is life threatening.   - Etiology of hyperkalemia being evaluated by nephrology. Given hyponatremia, hyperkalemia, low normal random cortisol, urine anion gap 33. 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 Fludrocortisone added by nephrology. Aldosterone and renin levels pending. Appreciate recs.   - K trended down today 6.6=>6.1=>5.4 Given improvement with steroids, kayexalate discontinued.   - Continue to monitor.     #UTI  - Urine culture revealed normal jefferson. On ceftriaxone as below.      #L elbow effusion w/ concern for bursitis   - Patient declined  surgical evaluation. Septic bursitis on differential. Day 9/10 of abx. Continue vancomycin and rocephin. Can deescalate when medically ready for discharge. Patient now agreeable for potential surgery. Ortho aware.     #Hyponatremia   - Suspect hypovolemic or related to RTA. Will give another 500 cc bolus.     #DM II  - A1C 11.1%, Diabetes educator consulted   - SSI , titrate as needed     #Mild troponin leak  - No acute ischemic changes on EKG. Normalized on repeat.     #Stable normocytic anemia     #Substance abuse   - UDS positive for amphetamines and buprenorphine. Pt is prescribed Suboxone and admits to recent methamphetamine abuse.  - Plans to continue cessation at discharge. Has psychiatry f/u scheduled for 6/11.     #Cirrhosis w/ hx of hepatitis C  - Appears compensated. Outpatient hepatology f/u     #Constipation   #N/V  - Continue to escalate bowel regimen as able. Patient declining enemas.     #Hypomagnesemia   - Replaced     #Insomnia  - Will be conservative given hx of drug abuse.     F: PO  E: Replace as needed   N: CC    Code status: Full     Dispo: Pending clinical improvement      VTE Prophylaxis:   Mechanical Order History:     None      Pharmalogical Order History:     Ordered     Dose Route Frequency Stop    05/25/20 0727  enoxaparin (LOVENOX) syringe 40 mg      40 mg SC Every 24 Hours --    05/25/20 0717  Pharmacy to Dose enoxaparin (LOVENOX)  Status:  Discontinued     Question:  Indication of use  Answer:  Prophylaxis    -- XX Continuous PRN 05/25/20 0727          Chris Pichardo MD  [facility]  06/04/20  20:16

## 2020-06-05 NOTE — PROGRESS NOTES
Discharge Planning Assessment  [facility]     Patient Name: Angelique Mcdermott  MRN: [id number]  Today's Date: 6/5/2020    Admit Date: 5/25/2020        Discharge Plan     Row Name 06/05/20 1343       Plan    Plan  Pt admitted on 5/25/20.  Pt was transferred to [address] [place] [address].  SS Bree received previous consult to arrange Orbativ infusion at discharge and arranged with [facility] infusion clinic and [facility].  SS will need notification if pt will continue to require this at discharge.  Pt lives at home with friends and plans to return home at discharge.  Pt currently does not utilize home health servies.  Pt currently utilizes w/c, bsc and hospital bed via unknown provider.  SS will follow.         NADINE Maldonado

## 2020-06-05 NOTE — SIGNIFICANT NOTE
06/05/20 1356   Rehab Treatment   Discipline physical therapist   Reason Treatment Not Performed patient/family declined treatment, not feeling well

## 2020-06-05 NOTE — PROGRESS NOTES
[facility] HOSPITALIST PROGRESS NOTE     Patient Identification:  Name:  Angelique Mcdermott  Age:  39 y.o.  Sex:  female  :  1980  MRN:  [id number]  Visit Number:  [id number]  ROOM: [address] [place] [address]     Primary Care Provider:  Provider, No Known    Length of stay in inpatient status:  11    Subjective     Chief Compliant:    Chief Complaint   Patient presents with   • Abdominal Pain       History of Presenting Illness:    Patient noted she she starving and demanded for NPO status to be lifted. Discussed with surgery and started diet. Patietn with no new complaints.     ROS:  Denies fevers/chills. Denies chest pain.   Otherwise 10 point ROS negative other than documented above in HPI.     Objective     Current Hospital Meds:  bacitracin  Topical Q12H   buprenorphine-naloxone 1.25 tablet Sublingual Daily   carvedilol 6.25 mg Oral BID With Meals   cefTRIAXone 2 g Intravenous Q24H   cholecalciferol 50,000 Units Oral Q7 Days   cloNIDine 0.2 mg Oral Q12H   docusate sodium 100 mg Oral BID   enoxaparin 40 mg Subcutaneous Q24H   fludrocortisone 50 mcg Oral Daily   folic acid 1 mg Oral Daily   insulin aspart 0-7 Units Subcutaneous TID AC   lactobacillus acidophilus 1 capsule Oral Daily   lactulose 30 g Oral TID   lactulose 40 g Oral Once   melatonin 10 mg Oral Nightly   polyethylene glycol 17 g Oral Daily   rOPINIRole 0.25 mg Oral Nightly   senna-docusate sodium 1 tablet Oral BID   sodium chloride 10 mL Intravenous Q12H   sodium chloride 10 mL Intravenous Q12H   sodium chloride 10 mL Intravenous Q12H   Vancomycin Pharmacy Intermittent Dosing  Does not apply Daily        Current Antimicrobial Therapy:  Anti-Infectives (From admission, onward)    Ordered     Dose/Rate Route Frequency Start Stop    20 1726  Vancomycin Pharmacy Intermittent Dosing     Ordering Provider:  Chris Pichardo MD     Does not apply Daily 20 1815 06/10/20 0859    20 1718  cefTRIAXone (ROCEPHIN) 2 g/100 mL 0.9% NS VTB (KARL)      Ordering Provider:  Chris Pichardo MD    2 g  over 30 Minutes Intravenous Every 24 Hours 06/05/20 1800 06/08/20 1759    06/03/20 1616  vancomycin 1 g/250 mL 0.9% NS (vial-mate)     Ordering Provider:  Chris Pichardo MD    1,000 mg  over 60 Minutes Intravenous Once 06/03/20 2200 06/03/20 2110    06/02/20 1411  vancomycin 1 g/250 mL 0.9% NS (vial-mate)     Ordering Provider:  Chris Pichardo MD    1,000 mg  over 60 Minutes Intravenous Once 06/02/20 1700 06/02/20 1730    06/02/20 1411  cefTRIAXone (ROCEPHIN) 2 g/100 mL 0.9% NS VTB (KARL)     Ema Michaud [facility] reviewed the order on 06/03/20 1041.   Ordering Provider:  Chris Pichardo MD    2 g  over 30 Minutes Intravenous Every 24 Hours 06/02/20 1600 06/04/20 1845    06/02/20 1411  Vancomycin Pharmacy Intermittent Dosing     Ordering Provider:  Chris Pichardo MD     Does not apply Daily 06/02/20 1600 06/05/20 0859    05/31/20 2130  Vancomycin Pharmacy Intermittent Dosing     Ordering Provider:  Jeremiah Medina DO     Does not apply Daily 06/01/20 0900 06/02/20 0859    05/31/20 0925  vancomycin 1 g/250 mL 0.9% NS (vial-mate)     Ordering Provider:  Jeremiah Medina DO    1,000 mg  over 60 Minutes Intravenous Once 05/31/20 1100 05/31/20 1251    05/29/20 0741  vancomycin 1 g/250 mL 0.9% NS (vial-mate)     Ordering Provider:  Jeremiah Medina DO    1,000 mg  over 60 Minutes Intravenous Once 05/29/20 0900 05/29/20 1010    05/25/20 0828  cefTRIAXone (ROCEPHIN) 2 g/100 mL 0.9% NS VTB (KARL)     Ordering Provider:  Jeremiah Medina DO    2 g  over 30 Minutes Intravenous Every 24 Hours 05/26/20 0700 06/01/20 0611    05/25/20 0603  cefTRIAXone (ROCEPHIN) 2 g/100 mL 0.9% NS VTB (KARL)     Ordering Provider:  Sharon Romero PA    2 g  over 30 Minutes Intravenous Once 05/25/20 0605 05/25/20 0739        Current Diuretic Therapy:  Diuretics (From admission, onward)    None             ----------------------------------------------------------------------------------------------------------------------  Vital Signs:  Temp:  [98.2 °F (36.8 °C)-98.6 °F (37 °C)] 98.2 °F (36.8 °C)  Heart Rate:  [90-96] 96  Resp:  [18-20] 18  BP: (112-146)/(67-99) 146/99  SpO2:  [93 %-95 %] 93 %  on   ;   Device (Oxygen Therapy): room air  Body mass index is 26.23 kg/m².    Wt Readings from Last 3 Encounters:   06/05/20 76 kg (167 lb 8 oz)   10/16/18 59 kg (130 lb)     Intake & Output (last 3 days)       06/02 0701 - 06/03 0700 06/03 0701 - 06/04 0700 06/04 0701 - 06/05 0700 06/05 0701 - 06/06 0700    P.O. 1660 1560 2760 360    I.V. (mL/kg) 889.6 (12)  90.4 (1.2)     IV Piggyback 100 100      Total Intake(mL/kg) 2649.6 (35.8) 1660 (22) 2850.4 (37.5) 360 (4.7)    Urine (mL/kg/hr) 600 (0.3) 1300 (0.7)      Stool  0      Total Output 600 1300      Net +2049.6 +360 +2850.4 +360            Urine Unmeasured Occurrence 6 x 3 x 5 x 1 x    Stool Unmeasured Occurrence 3 x 5 x 1 x         Diet Regular; Consistent Carbohydrate, Low Potassium  NPO Diet  ----------------------------------------------------------------------------------------------------------------------  Physical exam:  Constitutional:  Well-developed and well-nourished.  No respiratory distress.      HENT:  Head:  Normocephalic and atraumatic.  Mouth:  Moist mucous membranes.    Eyes:  Conjunctivae and EOM are normal. No scleral icterus.    Neck:  Neck supple.  No JVD present.    Cardiovascular:  Normal rate, regular rhythm and normal heart sounds with no murmur.  Pulmonary/Chest:  No respiratory distress, no wheezes, no crackles, with normal breath sounds and good air movement.  Abdominal:  Soft.  Bowel sounds are normal.  No distension and no tenderness.   Musculoskeletal:  L AKA, L elbow with some swelling/redness limited to bursa with no surrounding redness or warmth.   Neurological:  Alert and oriented to person, place, and time.  No cranial nerve  deficit.  No tongue deviation.  No facial droop.  No slurred speech.   Skin:  Skin is warm and dry. No rash noted. No pallor.   Peripheral vascular:  Pulses in all 4 extremities with no clubbing, no cyanosis, no edema.  ----------------------------------------------------------------------------------------------------------------------  Tele:    ----------------------------------------------------------------------------------------------------------------------  Results from last 7 days   Lab Units 06/05/20 0324 06/04/20  0423 06/02/20  0116   WBC 10*3/mm3 6.91 7.32 7.33   HEMOGLOBIN g/dL 6.9* 6.9* 7.5*   HEMATOCRIT % 24.0* 24.9* 26.0*   MCV fL 87.3 88.9 86.1   MCHC g/dL 28.8* 27.7* 28.8*   PLATELETS 10*3/mm3 285 305 239         Results from last 7 days   Lab Units 06/05/20  0324 06/04/20  0423 06/03/20  0956 06/03/20  0406  06/02/20  0511  06/01/20  0715 05/31/20  0049   SODIUM mmol/L 132* 133*  --  133*  --   --    < > 129* 133*   POTASSIUM mmol/L 5.6* 5.3* 6.1* 6.4*   < >  --    < > 6.0* 6.0*   MAGNESIUM mg/dL  --   --   --  2.4  --  1.7  --   --   --    CHLORIDE mmol/L 102 104  --  105  --   --    < > 101 103   CO2 mmol/L 22.3 23.1  --  21.8*  --   --    < > 20.8* 22.5   BUN mg/dL 34* 36*  --  42*  --   --    < > 41* 34*   CREATININE mg/dL 1.28* 1.36*  --  1.39*  --   --    < > 1.23* 1.18*   EGFR IF NONAFRICN AM mL/min/1.73 46* 43*  --  42*  --   --    < > 48* 50*   CALCIUM mg/dL 8.1* 8.4*  --  8.2*  --   --    < > 8.2* 8.2*   GLUCOSE mg/dL 337* 47*  --  221*  --   --    < > 104* 206*   ALBUMIN g/dL  --  2.45*  --   --   --   --   --  2.16* 2.20*   BILIRUBIN mg/dL  --  <0.2*  --   --   --   --   --  <0.2* <0.2*   ALK PHOS U/L  --  157*  --   --   --   --   --  142* 147*   AST (SGOT) U/L  --  35*  --   --   --   --   --  28 30   ALT (SGPT) U/L  --  24  --   --   --   --   --  19 19    < > = values in this interval not displayed.   Estimated Creatinine Clearance: 61.5 mL/min (A) (by C-G formula based on SCr of  1.28 mg/dL (H)).  No results found for: AMMONIA              Glucose   Date/Time Value Ref Range Status   06/05/2020 1623 221 (H) 70 - 130 mg/dL Final   06/05/2020 1050 226 (H) 70 - 130 mg/dL Final   06/05/2020 0814 281 (H) 70 - 130 mg/dL Final   06/05/2020 0657 301 (H) 70 - 130 mg/dL Final   06/04/2020 1935 196 (H) 70 - 130 mg/dL Final   06/04/2020 1632 217 (H) 70 - 130 mg/dL Final   06/04/2020 1113 292 (H) 70 - 130 mg/dL Final   06/04/2020 0915 169 (H) 70 - 130 mg/dL Final     No results found for: TSH, FREET4  No results found for: PREGTESTUR, PREGSERUM, HCG, HCGQUANT  Pain Management Panel     Pain Management Panel Latest Ref Rng & Units 6/4/2020 5/26/2020    CREATININE UR mg/dL 13.4 39.3    AMPHETAMINES SCREEN, URINE Negative - -    BARBITURATES SCREEN Negative - -    BENZODIAZEPINE SCREEN, URINE Negative - -    BUPRENORPHINEUR Negative - -    COCAINE SCREEN, URINE Negative - -    METHADONE SCREEN, URINE Negative - -        Brief Urine Lab Results  (Last result in the past 365 days)      Color   Clarity   Blood   Leuk Est   Nitrite   Protein   CREAT   Urine HCG        06/04/20 1112             13.4           No results found for: BLOODCX  No results found for: URINECX  No results found for: WOUNDCX  No results found for: STOOLCX  No results found for: RESPCX  No results found for: AFBCX        I have personally looked at the labs and they are summarized above.  ----------------------------------------------------------------------------------------------------------------------  Detailed radiology reports for the last 24 hours:    Imaging Results (Last 24 Hours)     ** No results found for the last 24 hours. **        Assessment & Plan    #SARINA vs. 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 0.97 om 10/18 only prior value  - Cr 1.28 this AM. Slightly bumped.   - Na stable at 132  - Hyperkalemia persistent even with aggressive bowel regimen and bowel  movements.   - Expressed importance for patient to comply with medications for hyperkalemia as condition is life threatening.   - Etiology of hyperkalemia being evaluated by nephrology. Given hyponatremia, hyperkalemia, low normal random cortisol, urine anion gap 33. 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 Fludrocortisone added by nephrology. Aldosterone and renin levels pending. Appreciate recs.   - K trended down today 6.6=>6.1=>5.4=>5.6 Given improvement with steroids, kayexalate discontinued.   - Continue to monitor.     #UTI  - Urine culture revealed normal jefferson. On ceftriaxone as below.      #L elbow effusion w/ concern for bursitis   - Patient declined surgical evaluation. Septic bursitis on differential. Day 10/10 of abx but elbow still red, but very improved compared to my first exam. Continue vancomycin and rocephin for an extended 14 day course. Can deescalate when medically ready for discharge. Patient declined surgery earlier in hospitalization but is now agreeable for potential surgery. Ortho aware.     #Hyponatremia   - Suspect hypovolemic or related to RTA. Stable.     #DM II  - A1C 11.1%, Diabetes educator consulted   - SSI , titrate as needed     #Mild troponin leak  - No acute ischemic changes on EKG. Normalized on repeat.     #Stable normocytic anemia     #Substance abuse   - UDS positive for amphetamines and buprenorphine. Pt is prescribed Suboxone and admits to recent methamphetamine abuse.  - Plans to continue cessation at discharge. Has psychiatry f/u scheduled for 6/11.     #Cirrhosis w/ hx of hepatitis C  - Appears compensated. Outpatient hepatology f/u     #Constipation   #N/V  - Continue to escalate bowel regimen as able. Patient declining enemas.     #Hypomagnesemia   - Replaced     #Insomnia  - Will be conservative given hx of drug abuse.     F: PO  E: Replace as needed   N: CC    Code status: Full     Dispo: Pending clinical improvement    VTE Prophylaxis:   Mechanical Order History:     None       Pharmalogical Order History:     Ordered     Dose Route Frequency Stop    05/25/20 0727  enoxaparin (LOVENOX) syringe 40 mg      40 mg SC Every 24 Hours --    05/25/20 0717  Pharmacy to Dose enoxaparin (LOVENOX)  Status:  Discontinued     Question:  Indication of use  Answer:  Prophylaxis    -- XX Continuous PRN 05/25/20 0727          Chris Pichardo MD  [facility]  06/05/20  18:00

## 2020-06-05 NOTE — PROGRESS NOTES
Nephrology Progress Note      Subjective   Pt feels tired, and feels hungry. Co left elbow redness and swelling.     Objective       Vital signs :     Temp:  [96.8 °F (36 °C)-98.6 °F (37 °C)] 98.5 °F (36.9 °C)  Heart Rate:  [] 94  Resp:  [18-20] 18  BP: (112-139)/(67-90) 138/88      Intake/Output Summary (Last 24 hours) at 6/5/2020 0948  Last data filed at 6/5/2020 0516  Gross per 24 hour   Intake 2130.41 ml   Output --   Net 2130.41 ml       Physical Exam:    General Appearance : not in acute distress  Lungs : clear to auscultation, respirations regular  Heart :  regular rhythm & normal rate, normal S1, S2 and no murmur, no rub  Abdomen : normal bowel sounds, no masses, no hepatomegaly, no splenomegaly, soft non-tender and no guarding  Extremities : moves extremities well, no edema, no cyanosis and no redness  Skin :  no bleeding, bruising or rash  Neurologic :   orientated to person, place, time and situation, Grossly no focal deficits  Acess :       Laboratory Data :     Albumin Albumin   Date Value Ref Range Status   06/04/2020 2.45 (L) 3.50 - 5.20 g/dL Final      Magnesium Magnesium   Date Value Ref Range Status   06/03/2020 2.4 1.6 - 2.6 mg/dL Final          PTH               No results found for: PTH    CBC and coagulation:  Results from last 7 days   Lab Units 06/05/20  0324 06/04/20  0423 06/02/20  0116   WBC 10*3/mm3 6.91 7.32 7.33   HEMOGLOBIN g/dL 6.9* 6.9* 7.5*   HEMATOCRIT % 24.0* 24.9* 26.0*   MCV fL 87.3 88.9 86.1   MCHC g/dL 28.8* 27.7* 28.8*   PLATELETS 10*3/mm3 285 305 239     Acid/base balance:      Renal and electrolytes:  Results from last 7 days   Lab Units 06/05/20  0324 06/04/20  0423 06/03/20  0956 06/03/20  0406  06/02/20  0511  06/02/20  0116 06/01/20  0715   SODIUM mmol/L 132* 133*  --  133*  --   --   --  129* 129*   POTASSIUM mmol/L 5.6* 5.3* 6.1* 6.4*   < >  --    < > 6.6* 6.0*   MAGNESIUM mg/dL  --   --   --  2.4  --  1.7  --   --   --    CHLORIDE mmol/L 102 104  --  105  --    --   --  103 101   CO2 mmol/L 22.3 23.1  --  21.8*  --   --   --  20.0* 20.8*   BUN mg/dL 34* 36*  --  42*  --   --   --  45* 41*   CREATININE mg/dL 1.28* 1.36*  --  1.39*  --   --   --  1.25* 1.23*   EGFR IF NONAFRICN AM mL/min/1.73 46* 43*  --  42*  --   --   --  47* 48*   CALCIUM mg/dL 8.1* 8.4*  --  8.2*  --   --   --  7.9* 8.2*    < > = values in this interval not displayed.     Estimated Creatinine Clearance: 61.5 mL/min (A) (by C-G formula based on SCr of 1.28 mg/dL (H)).    Liver and pancreatic function:  Results from last 7 days   Lab Units 06/04/20 0423 06/01/20  0715 05/31/20  0049   ALBUMIN g/dL 2.45* 2.16* 2.20*   BILIRUBIN mg/dL <0.2* <0.2* <0.2*   ALK PHOS U/L 157* 142* 147*   AST (SGOT) U/L 35* 28 30   ALT (SGPT) U/L 24 19 19         Cardiac:      Liver and pancreatic function:  Results from last 7 days   Lab Units 06/04/20 0423 06/01/20  0715 05/31/20  0049   ALBUMIN g/dL 2.45* 2.16* 2.20*   BILIRUBIN mg/dL <0.2* <0.2* <0.2*   ALK PHOS U/L 157* 142* 147*   AST (SGOT) U/L 35* 28 30   ALT (SGPT) U/L 24 19 19       Medications :       bacitracin  Topical Q12H   buprenorphine-naloxone 1.25 tablet Sublingual Daily   carvedilol 6.25 mg Oral BID With Meals   cholecalciferol 50,000 Units Oral Q7 Days   cloNIDine 0.2 mg Oral Q12H   docusate sodium 100 mg Oral BID   enoxaparin 40 mg Subcutaneous Q24H   fludrocortisone 50 mcg Oral Daily   folic acid 1 mg Oral Daily   insulin aspart 0-7 Units Subcutaneous TID AC   lactobacillus acidophilus 1 capsule Oral Daily   lactulose 30 g Oral TID   melatonin 10 mg Oral Nightly   polyethylene glycol 17 g Oral Daily   rOPINIRole 0.25 mg Oral Nightly   senna-docusate sodium 1 tablet Oral BID   sodium chloride 10 mL Intravenous Q12H   sodium chloride 10 mL Intravenous Q12H   sodium chloride 10 mL Intravenous Q12H            Assessment/Plan     1. Persistent Hyperkalemia  2. Sever constipation  3. SARINA on CKD  4. DM-II  5. Essential hypertension    Hyperkalemia better after  starting on fludrocortisone, Cr improved to 1.2. Had about 2.9 G proteinuria most likely due to DKD, I have sent serology that is pending     Hyperkalemia likely 2/2 sever constipation, less likely from RTA.   UAG 33, suggestive of RTA, likely type IV with hyporeninemic hypoaldosteronism  Baseline Cr <1, admitted with 1.3, 2.9 G proteinuria, likely diabetic nephrosclerosis  -cotinue on docusate, senna  -once dose Kayexylate  -continue fludrocortisone 50mcg daily      Jonnathan Kinney MD  06/05/20  09:48

## 2020-06-05 NOTE — PLAN OF CARE
Patient had some complaints of nausea, gave meds as ordered. No other complaints at this time, educated to not eat or drink anything after midnight. Will continue to monitor.

## 2020-06-05 NOTE — PLAN OF CARE
Pt resting in bed. Pt complaining of nausea, prn meds given per order. No s/s of distress. Will continue to monitor.

## 2020-06-05 NOTE — PROGRESS NOTES
Random vancomycin level obtained today was reported at 18.7 mg/L.  Will give 1g x 1 dose today to maintain therapeutic levels.  Pharmacy will continue to follow and determine further dosing with random levels until renal function improves. Thank you.

## 2020-06-06 LAB
ANION GAP SERPL CALCULATED.3IONS-SCNC: 7.6 MMOL/L (ref 5–15)
BUN BLD-MCNC: 37 MG/DL (ref 6–20)
BUN/CREAT SERPL: 28.9 (ref 7–25)
CALCIUM SPEC-SCNC: 8.4 MG/DL (ref 8.6–10.5)
CHLORIDE SERPL-SCNC: 101 MMOL/L (ref 98–107)
CO2 SERPL-SCNC: 19.4 MMOL/L (ref 22–29)
CREAT BLD-MCNC: 1.28 MG/DL (ref 0.57–1)
GFR SERPL CREATININE-BSD FRML MDRD: 46 ML/MIN/1.73
GLUCOSE BLD-MCNC: 227 MG/DL (ref 65–99)
GLUCOSE BLDC GLUCOMTR-MCNC: 212 MG/DL (ref 70–130)
GLUCOSE BLDC GLUCOMTR-MCNC: 240 MG/DL (ref 70–130)
GLUCOSE BLDC GLUCOMTR-MCNC: 243 MG/DL (ref 70–130)
POTASSIUM BLD-SCNC: 5.4 MMOL/L (ref 3.5–5.2)
POTASSIUM BLD-SCNC: 5.8 MMOL/L (ref 3.5–5.2)
POTASSIUM BLD-SCNC: 6 MMOL/L (ref 3.5–5.2)
SODIUM BLD-SCNC: 128 MMOL/L (ref 136–145)

## 2020-06-06 PROCEDURE — 63710000001 INSULIN ASPART PER 5 UNITS: Performed by: INTERNAL MEDICINE

## 2020-06-06 PROCEDURE — 25010000002 ENOXAPARIN PER 10 MG: Performed by: INTERNAL MEDICINE

## 2020-06-06 PROCEDURE — 25010000002 ONDANSETRON PER 1 MG: Performed by: INTERNAL MEDICINE

## 2020-06-06 PROCEDURE — 82962 GLUCOSE BLOOD TEST: CPT

## 2020-06-06 PROCEDURE — 84132 ASSAY OF SERUM POTASSIUM: CPT | Performed by: INTERNAL MEDICINE

## 2020-06-06 PROCEDURE — 80048 BASIC METABOLIC PNL TOTAL CA: CPT | Performed by: INTERNAL MEDICINE

## 2020-06-06 PROCEDURE — 25010000002 CEFTRIAXONE PER 250 MG: Performed by: INTERNAL MEDICINE

## 2020-06-06 PROCEDURE — 25010000002 FUROSEMIDE PER 20 MG: Performed by: INTERNAL MEDICINE

## 2020-06-06 PROCEDURE — 25010000002 PROMETHAZINE PER 50 MG: Performed by: INTERNAL MEDICINE

## 2020-06-06 PROCEDURE — 99232 SBSQ HOSP IP/OBS MODERATE 35: CPT | Performed by: INTERNAL MEDICINE

## 2020-06-06 PROCEDURE — 94799 UNLISTED PULMONARY SVC/PX: CPT

## 2020-06-06 RX ORDER — FUROSEMIDE 10 MG/ML
40 INJECTION INTRAMUSCULAR; INTRAVENOUS ONCE
Status: COMPLETED | OUTPATIENT
Start: 2020-06-06 | End: 2020-06-06

## 2020-06-06 RX ORDER — SODIUM POLYSTYRENE SULFONATE 4.1 MEQ/G
30 POWDER, FOR SUSPENSION ORAL; RECTAL ONCE
Status: COMPLETED | OUTPATIENT
Start: 2020-06-06 | End: 2020-06-06

## 2020-06-06 RX ORDER — NYSTATIN 100000 [USP'U]/G
POWDER TOPICAL EVERY 12 HOURS SCHEDULED
Status: DISCONTINUED | OUTPATIENT
Start: 2020-06-06 | End: 2020-06-15 | Stop reason: HOSPADM

## 2020-06-06 RX ADMIN — Medication 10 MG: at 20:33

## 2020-06-06 RX ADMIN — ROPINIROLE HYDROCHLORIDE 0.25 MG: 0.25 TABLET, FILM COATED ORAL at 20:33

## 2020-06-06 RX ADMIN — TIZANIDINE 4 MG: 4 TABLET ORAL at 20:35

## 2020-06-06 RX ADMIN — CARVEDILOL 6.25 MG: 6.25 TABLET, FILM COATED ORAL at 17:32

## 2020-06-06 RX ADMIN — INSULIN ASPART 3 UNITS: 100 INJECTION, SOLUTION INTRAVENOUS; SUBCUTANEOUS at 16:24

## 2020-06-06 RX ADMIN — SODIUM CHLORIDE, PRESERVATIVE FREE 10 ML: 5 INJECTION INTRAVENOUS at 20:34

## 2020-06-06 RX ADMIN — FUROSEMIDE 40 MG: 10 INJECTION, SOLUTION INTRAMUSCULAR; INTRAVENOUS at 16:24

## 2020-06-06 RX ADMIN — CARVEDILOL 6.25 MG: 6.25 TABLET, FILM COATED ORAL at 10:12

## 2020-06-06 RX ADMIN — BACITRACIN: 500 OINTMENT TOPICAL at 10:03

## 2020-06-06 RX ADMIN — FLUDROCORTISONE ACETATE 50 MCG: 0.1 TABLET ORAL at 10:12

## 2020-06-06 RX ADMIN — ONDANSETRON 4 MG: 2 INJECTION INTRAMUSCULAR; INTRAVENOUS at 09:35

## 2020-06-06 RX ADMIN — ENOXAPARIN SODIUM 40 MG: 40 INJECTION SUBCUTANEOUS at 11:27

## 2020-06-06 RX ADMIN — CLONIDINE HYDROCHLORIDE 0.2 MG: 0.2 TABLET ORAL at 20:33

## 2020-06-06 RX ADMIN — SODIUM POLYSTYRENE SULFONATE 30 G: 1 POWDER ORAL; RECTAL at 10:02

## 2020-06-06 RX ADMIN — NYSTATIN: 100000 POWDER TOPICAL at 11:27

## 2020-06-06 RX ADMIN — BUPRENORPHINE HYDROCHLORIDE AND NALOXONE HYDROCHLORIDE 1.25 TABLET: 8; 2 TABLET SUBLINGUAL at 11:11

## 2020-06-06 RX ADMIN — BACITRACIN: 500 OINTMENT TOPICAL at 20:35

## 2020-06-06 RX ADMIN — PROMETHAZINE HYDROCHLORIDE 12.5 MG: 25 INJECTION INTRAMUSCULAR; INTRAVENOUS at 10:10

## 2020-06-06 RX ADMIN — LACTULOSE 30 G: 10 SOLUTION ORAL at 16:24

## 2020-06-06 RX ADMIN — PROMETHAZINE HYDROCHLORIDE 12.5 MG: 25 INJECTION INTRAMUSCULAR; INTRAVENOUS at 04:15

## 2020-06-06 RX ADMIN — TIZANIDINE 4 MG: 4 TABLET ORAL at 10:12

## 2020-06-06 RX ADMIN — SODIUM CHLORIDE 2 G: 900 INJECTION INTRAVENOUS at 17:32

## 2020-06-06 RX ADMIN — Medication 1 CAPSULE: at 10:13

## 2020-06-06 RX ADMIN — NYSTATIN: 100000 POWDER TOPICAL at 20:34

## 2020-06-06 RX ADMIN — CLONIDINE HYDROCHLORIDE 0.2 MG: 0.2 TABLET ORAL at 10:12

## 2020-06-06 RX ADMIN — INSULIN ASPART 3 UNITS: 100 INJECTION, SOLUTION INTRAVENOUS; SUBCUTANEOUS at 07:38

## 2020-06-06 RX ADMIN — INSULIN ASPART 3 UNITS: 100 INJECTION, SOLUTION INTRAVENOUS; SUBCUTANEOUS at 11:27

## 2020-06-06 RX ADMIN — PROMETHAZINE HYDROCHLORIDE 12.5 MG: 25 INJECTION INTRAMUSCULAR; INTRAVENOUS at 16:24

## 2020-06-06 RX ADMIN — PROMETHAZINE HYDROCHLORIDE 12.5 MG: 25 INJECTION INTRAMUSCULAR; INTRAVENOUS at 23:27

## 2020-06-06 RX ADMIN — LACTULOSE 30 G: 10 SOLUTION ORAL at 10:02

## 2020-06-06 NOTE — PROGRESS NOTES
Inpatient Progress Note  Angelique Mcdermott  Date: 06/06/20  MRN: [id number]      Subjective:   Patient notes improvement of left elbow pain and swelling.  Is responding well to IV antibiotics to left elbow.  The patient has full range of motion of the left elbow.  Orthopedic surgery has been following secondary to left elbow olecranon bursitis.  The patient notes no new attributing symptoms this morning.  Pain is controlled.        Objective:    Vitals:    06/06/20 0300 06/06/20 0451 06/06/20 0630 06/06/20 1033   BP: 124/81  125/79 134/91   BP Location:   Left arm Left arm   Patient Position:   Lying Lying   Pulse: 87  87 104   Resp: 18  18 18   Temp: 98.7 °F (37.1 °C)  98.3 °F (36.8 °C) 98.6 °F (37 °C)   TempSrc:   Oral Oral   SpO2: 94%  93% 90%   Weight:  75.8 kg (167 lb 3.2 oz)     Height:              Physical Exam:  Constitutional: Chronically ill female.  No respiratory distress.        Musculoskeletal: Upon examination of the left elbow there is no significant cellulitis or erythema at the left elbow olecranon bursa.  There is some minimal soft tissue swelling noted.  This is improved from previous examination.  Good neurovascular status left upper extremity noted.  Flexion-extension intact to the distal digits left hand.  There is no open wounds to the left elbow.  Patient has full range of motion of the left elbow.      Labs:    Results from last 7 days   Lab Units 06/05/20  0324 06/04/20  0423 06/02/20  0116   WBC 10*3/mm3 6.91 7.32 7.33   HEMOGLOBIN g/dL 6.9* 6.9* 7.5*   HEMATOCRIT % 24.0* 24.9* 26.0*   MCV fL 87.3 88.9 86.1   MCHC g/dL 28.8* 27.7* 28.8*   PLATELETS 10*3/mm3 285 305 239         Results from last 7 days   Lab Units 06/06/20  0812 06/05/20  0324 06/04/20  0423  06/03/20  0406  06/02/20  0511  06/01/20  0715 05/31/20  0049   SODIUM mmol/L 128* 132* 133*  --  133*  --   --    < > 129* 133*   POTASSIUM mmol/L 6.0* 5.6* 5.3*   < > 6.4*   < >  --    < > 6.0* 6.0*   MAGNESIUM mg/dL  --   --   --    --  2.4  --  1.7  --   --   --    CHLORIDE mmol/L 101 102 104  --  105  --   --    < > 101 103   CO2 mmol/L 19.4* 22.3 23.1  --  21.8*  --   --    < > 20.8* 22.5   BUN mg/dL 37* 34* 36*  --  42*  --   --    < > 41* 34*   CREATININE mg/dL 1.28* 1.28* 1.36*  --  1.39*  --   --    < > 1.23* 1.18*   EGFR IF NONAFRICN AM mL/min/1.73 46* 46* 43*  --  42*  --   --    < > 48* 50*   CALCIUM mg/dL 8.4* 8.1* 8.4*  --  8.2*  --   --    < > 8.2* 8.2*   GLUCOSE mg/dL 227* 337* 47*  --  221*  --   --    < > 104* 206*   ALBUMIN g/dL  --   --  2.45*  --   --   --   --   --  2.16* 2.20*   BILIRUBIN mg/dL  --   --  <0.2*  --   --   --   --   --  <0.2* <0.2*   ALK PHOS U/L  --   --  157*  --   --   --   --   --  142* 147*   AST (SGOT) U/L  --   --  35*  --   --   --   --   --  28 30   ALT (SGPT) U/L  --   --  24  --   --   --   --   --  19 19    < > = values in this interval not displayed.   Estimated Creatinine Clearance: 61.5 mL/min (A) (by C-G formula based on SCr of 1.28 mg/dL (H)).  No results found for: AMMONIA          No results found for: HGBA1C  No results found for: TSH, FREET4      Pain Management Panel     Pain Management Panel Latest Ref Rng & Units 6/4/2020 5/26/2020    CREATININE UR mg/dL 13.4 39.3    AMPHETAMINES SCREEN, URINE Negative - -    BARBITURATES SCREEN Negative - -    BENZODIAZEPINE SCREEN, URINE Negative - -    BUPRENORPHINEUR Negative - -    COCAINE SCREEN, URINE Negative - -    METHADONE SCREEN, URINE Negative - -          No results found for: BLOODCX  No results found for: URINECX  No results found for: WOUNDCX  No results found for: STOOLCX              Radiology:  Imaging Results (Last 72 Hours)     ** No results found for the last 72 hours. **            Assessment:   1. Left Elbow olecranon bursitis, improving.  2.  History of MRSA          Plan:   Dr. Rhoades recommends no surgical intervention.  Left elbow olecranon bursitis is responding well to IV antibiotics.  The patient has full range of motion  of left elbow, no suspicion of joint involvement noted.  The patient will continue IV antibiotics as per hospitalist recommendations.  No immediate surgical intervention is warranted from orthopedic surgery standpoint.  Patient can follow-up with orthopedic service in 2 weeks.  Orthopedic surgery will sign off on the patient, reconsult as needed on an inpatient basis.        Janusz Dolan, APRN June 6, 2020 13:54

## 2020-06-06 NOTE — PLAN OF CARE
Patient had some complaints of nausea, received meds as ordered. No other complaints at this time. Will continue to monitor.

## 2020-06-06 NOTE — PROGRESS NOTES
Interval History:     Patient Complaints: Patient denies any complaints.  Denies any shortness of breath.  Pain in the left elbow is a little better.  No fever.  No chills.  Some blurring of vision.  Visual acuity is severely impeded.  Has not had a diabetic eye exam for many many years.  She has been diabetic for more than 18 or 20 years.  She denies any prior knowledge of proteinuria.  She is constipated.  Currently she is not on a low potassium diet and has fries and tomatoes on her plate        Vital Signs  Temp:  [98.3 °F (36.8 °C)-98.7 °F (37.1 °C)] 98.6 °F (37 °C)  Heart Rate:  [] 104  Resp:  [18-20] 18  BP: (122-134)/(79-91) 134/91    Physical Exam:    General:           No distress      HEENT:  Pallor               Neck:  No JVD       Lungs:    Clear   Heart:   Regular,  no rub       Abdomen:   Normal bowel sounds, soft non-tender, non-distended, no guarding       Extremities:  2-3+ edema right lower extremity       Skin: No petechiae, no rash                Results Review:    I reviewed the patient's new clinical results.    Lab Results (last 24 hours)     Procedure Component Value Units Date/Time    Potassium [[id number]]  (Abnormal) Collected:  06/06/20 1447    Specimen:  Blood Updated:  06/06/20 1503     Potassium 5.8 mmol/L     POC Glucose Once [[id number]]  (Abnormal) Collected:  06/06/20 1104    Specimen:  Blood Updated:  06/06/20 1111     Glucose 243 mg/dL     Basic Metabolic Panel [[id number]]  (Abnormal) Collected:  06/06/20 0812    Specimen:  Blood Updated:  06/06/20 0850     Glucose 227 mg/dL      BUN 37 mg/dL      Creatinine 1.28 mg/dL      Sodium 128 mmol/L      Potassium 6.0 mmol/L      Chloride 101 mmol/L      CO2 19.4 mmol/L      Calcium 8.4 mg/dL      eGFR Non African Amer 46 mL/min/1.73      BUN/Creatinine Ratio 28.9     Anion Gap 7.6 mmol/L     Narrative:       GFR Normal >60  Chronic Kidney Disease <60  Kidney Failure <15      POC Glucose Once [[id number]]  (Abnormal) Collected:   06/06/20 0628    Specimen:  Blood Updated:  06/06/20 0638     Glucose 240 mg/dL     POC Glucose Once [[id number]]  (Abnormal) Collected:  06/05/20 2051    Specimen:  Blood Updated:  06/05/20 2111     Glucose 251 mg/dL     C3 Complement [[id number]]  (Normal) Collected:  06/05/20 0324    Specimen:  Blood Updated:  06/05/20 1924     C3 Complement 130.0 mg/dl     C4 Complement [[id number]]  (Normal) Collected:  06/05/20 0324    Specimen:  Blood Updated:  06/05/20 1924     C4 Complement 15.0 mg/dl     Vancomycin, Random [[id number]]  (Normal) Collected:  06/05/20 1737    Specimen:  Blood Updated:  06/05/20 1900     Vancomycin Random 18.70 mcg/mL     POC Glucose Once [[id number]]  (Abnormal) Collected:  06/05/20 1623    Specimen:  Blood Updated:  06/05/20 1650     Glucose 221 mg/dL           Imaging Results (Last 24 Hours)     ** No results found for the last 24 hours. **          Assessment and Plan:    1. Persistent Hyperkalemia  2. Severe constipation  3. SARINA on CKD3  4. DM-II  5. Essential hypertension  6.Nephrotic syndrome    Start a low potassium diet.  She has had 2 bowel movements since I evaluated her so I am hoping that the potassium will come down and I have requested the nurse to order one at 5 PM.  Dr. Kinney has ordered studies to rule out systemic illness and I will add a couple of test to what he has ordered.  Will add some Lasix as this will help the edema and potentially hyperkalemia as well with close watch on kidney function    Venkata Soler MD  06/06/20  15:35

## 2020-06-06 NOTE — PLAN OF CARE
Patient has had complaints of nausea throughout the shift, PRN medications helpful.  Nioñ cath was inserted for 24 hours to collect a urine specimen, to be removed tomorrow when 24 urine is finished.  Will continue with current plan of care.

## 2020-06-07 LAB
ANION GAP SERPL CALCULATED.3IONS-SCNC: 6.3 MMOL/L (ref 5–15)
BASOPHILS # BLD AUTO: 0.07 10*3/MM3 (ref 0–0.2)
BASOPHILS NFR BLD AUTO: 0.9 % (ref 0–1.5)
BUN BLD-MCNC: 34 MG/DL (ref 6–20)
BUN/CREAT SERPL: 26.8 (ref 7–25)
CALCIUM SPEC-SCNC: 8.5 MG/DL (ref 8.6–10.5)
CHLORIDE SERPL-SCNC: 100 MMOL/L (ref 98–107)
CO2 SERPL-SCNC: 23.7 MMOL/L (ref 22–29)
CREAT BLD-MCNC: 1.27 MG/DL (ref 0.57–1)
DEPRECATED RDW RBC AUTO: 58.7 FL (ref 37–54)
EOSINOPHIL # BLD AUTO: 0.38 10*3/MM3 (ref 0–0.4)
EOSINOPHIL NFR BLD AUTO: 5 % (ref 0.3–6.2)
ERYTHROCYTE [DISTWIDTH] IN BLOOD BY AUTOMATED COUNT: 18.6 % (ref 12.3–15.4)
GFR SERPL CREATININE-BSD FRML MDRD: 46 ML/MIN/1.73
GLUCOSE BLD-MCNC: 256 MG/DL (ref 65–99)
GLUCOSE BLDC GLUCOMTR-MCNC: 208 MG/DL (ref 70–130)
GLUCOSE BLDC GLUCOMTR-MCNC: 246 MG/DL (ref 70–130)
GLUCOSE BLDC GLUCOMTR-MCNC: 266 MG/DL (ref 70–130)
HCT VFR BLD AUTO: 24.7 % (ref 34–46.6)
HGB BLD-MCNC: 7.1 G/DL (ref 12–15.9)
HYPOCHROMIA BLD QL: NORMAL
IMM GRANULOCYTES # BLD AUTO: 0.03 10*3/MM3 (ref 0–0.05)
IMM GRANULOCYTES NFR BLD AUTO: 0.4 % (ref 0–0.5)
LYMPHOCYTES # BLD AUTO: 2.15 10*3/MM3 (ref 0.7–3.1)
LYMPHOCYTES NFR BLD AUTO: 28.1 % (ref 19.6–45.3)
MCH RBC QN AUTO: 24.7 PG (ref 26.6–33)
MCHC RBC AUTO-ENTMCNC: 28.7 G/DL (ref 31.5–35.7)
MCV RBC AUTO: 86.1 FL (ref 79–97)
MONOCYTES # BLD AUTO: 0.62 10*3/MM3 (ref 0.1–0.9)
MONOCYTES NFR BLD AUTO: 8.1 % (ref 5–12)
NEUTROPHILS # BLD AUTO: 4.4 10*3/MM3 (ref 1.7–7)
NEUTROPHILS NFR BLD AUTO: 57.5 % (ref 42.7–76)
NRBC BLD AUTO-RTO: 0 /100 WBC (ref 0–0.2)
PLAT MORPH BLD: NORMAL
PLATELET # BLD AUTO: 293 10*3/MM3 (ref 140–450)
PMV BLD AUTO: 10.2 FL (ref 6–12)
POTASSIUM BLD-SCNC: 5.3 MMOL/L (ref 3.5–5.2)
RBC # BLD AUTO: 2.87 10*6/MM3 (ref 3.77–5.28)
SODIUM BLD-SCNC: 130 MMOL/L (ref 136–145)
VANCOMYCIN SERPL-MCNC: 18.4 MCG/ML (ref 5–40)
WBC NRBC COR # BLD: 7.65 10*3/MM3 (ref 3.4–10.8)

## 2020-06-07 PROCEDURE — 80202 ASSAY OF VANCOMYCIN: CPT | Performed by: INTERNAL MEDICINE

## 2020-06-07 PROCEDURE — 84155 ASSAY OF PROTEIN SERUM: CPT | Performed by: INTERNAL MEDICINE

## 2020-06-07 PROCEDURE — 86334 IMMUNOFIX E-PHORESIS SERUM: CPT | Performed by: INTERNAL MEDICINE

## 2020-06-07 PROCEDURE — 83516 IMMUNOASSAY NONANTIBODY: CPT | Performed by: INTERNAL MEDICINE

## 2020-06-07 PROCEDURE — 86225 DNA ANTIBODY NATIVE: CPT | Performed by: INTERNAL MEDICINE

## 2020-06-07 PROCEDURE — 85025 COMPLETE CBC W/AUTO DIFF WBC: CPT | Performed by: INTERNAL MEDICINE

## 2020-06-07 PROCEDURE — 82962 GLUCOSE BLOOD TEST: CPT

## 2020-06-07 PROCEDURE — 83883 ASSAY NEPHELOMETRY NOT SPEC: CPT | Performed by: INTERNAL MEDICINE

## 2020-06-07 PROCEDURE — 94799 UNLISTED PULMONARY SVC/PX: CPT

## 2020-06-07 PROCEDURE — 85007 BL SMEAR W/DIFF WBC COUNT: CPT | Performed by: INTERNAL MEDICINE

## 2020-06-07 PROCEDURE — 25010000002 CEFTRIAXONE PER 250 MG: Performed by: INTERNAL MEDICINE

## 2020-06-07 PROCEDURE — 84166 PROTEIN E-PHORESIS/URINE/CSF: CPT | Performed by: INTERNAL MEDICINE

## 2020-06-07 PROCEDURE — 25010000002 ENOXAPARIN PER 10 MG: Performed by: INTERNAL MEDICINE

## 2020-06-07 PROCEDURE — 99232 SBSQ HOSP IP/OBS MODERATE 35: CPT | Performed by: INTERNAL MEDICINE

## 2020-06-07 PROCEDURE — 84156 ASSAY OF PROTEIN URINE: CPT | Performed by: INTERNAL MEDICINE

## 2020-06-07 PROCEDURE — 25010000002 IRON SUCROSE PER 1 MG: Performed by: INTERNAL MEDICINE

## 2020-06-07 PROCEDURE — 84165 PROTEIN E-PHORESIS SERUM: CPT | Performed by: INTERNAL MEDICINE

## 2020-06-07 PROCEDURE — 82784 ASSAY IGA/IGD/IGG/IGM EACH: CPT | Performed by: INTERNAL MEDICINE

## 2020-06-07 PROCEDURE — 25010000002 PROMETHAZINE PER 50 MG: Performed by: INTERNAL MEDICINE

## 2020-06-07 PROCEDURE — 80048 BASIC METABOLIC PNL TOTAL CA: CPT | Performed by: INTERNAL MEDICINE

## 2020-06-07 PROCEDURE — 63710000001 INSULIN ASPART PER 5 UNITS: Performed by: INTERNAL MEDICINE

## 2020-06-07 PROCEDURE — 63710000001 DIPHENHYDRAMINE PER 50 MG: Performed by: INTERNAL MEDICINE

## 2020-06-07 PROCEDURE — 25010000002 VANCOMYCIN 1 G RECONSTITUTED SOLUTION: Performed by: INTERNAL MEDICINE

## 2020-06-07 RX ORDER — BUMETANIDE 1 MG/1
2 TABLET ORAL ONCE
Status: COMPLETED | OUTPATIENT
Start: 2020-06-07 | End: 2020-06-07

## 2020-06-07 RX ORDER — METOLAZONE 2.5 MG/1
5 TABLET ORAL ONCE
Status: DISCONTINUED | OUTPATIENT
Start: 2020-06-07 | End: 2020-06-15 | Stop reason: HOSPADM

## 2020-06-07 RX ADMIN — CLONIDINE HYDROCHLORIDE 0.2 MG: 0.2 TABLET ORAL at 08:26

## 2020-06-07 RX ADMIN — TIZANIDINE 4 MG: 4 TABLET ORAL at 08:30

## 2020-06-07 RX ADMIN — VANCOMYCIN HYDROCHLORIDE 1000 MG: 1 INJECTION, POWDER, LYOPHILIZED, FOR SOLUTION INTRAVENOUS at 12:18

## 2020-06-07 RX ADMIN — ROPINIROLE HYDROCHLORIDE 0.25 MG: 0.25 TABLET, FILM COATED ORAL at 20:34

## 2020-06-07 RX ADMIN — PROMETHAZINE HYDROCHLORIDE 12.5 MG: 25 INJECTION INTRAMUSCULAR; INTRAVENOUS at 20:35

## 2020-06-07 RX ADMIN — CLONIDINE HYDROCHLORIDE 0.2 MG: 0.2 TABLET ORAL at 20:34

## 2020-06-07 RX ADMIN — BACITRACIN: 500 OINTMENT TOPICAL at 20:35

## 2020-06-07 RX ADMIN — INSULIN ASPART 4 UNITS: 100 INJECTION, SOLUTION INTRAVENOUS; SUBCUTANEOUS at 16:12

## 2020-06-07 RX ADMIN — INSULIN ASPART 3 UNITS: 100 INJECTION, SOLUTION INTRAVENOUS; SUBCUTANEOUS at 12:18

## 2020-06-07 RX ADMIN — TIZANIDINE 4 MG: 4 TABLET ORAL at 20:34

## 2020-06-07 RX ADMIN — DOCUSATE SODIUM 100 MG: 100 CAPSULE, LIQUID FILLED ORAL at 20:34

## 2020-06-07 RX ADMIN — NYSTATIN: 100000 POWDER TOPICAL at 20:35

## 2020-06-07 RX ADMIN — NYSTATIN: 100000 POWDER TOPICAL at 08:27

## 2020-06-07 RX ADMIN — IRON SUCROSE 300 MG: 20 INJECTION, SOLUTION INTRAVENOUS at 20:42

## 2020-06-07 RX ADMIN — Medication 1 CAPSULE: at 08:26

## 2020-06-07 RX ADMIN — CARVEDILOL 6.25 MG: 6.25 TABLET, FILM COATED ORAL at 08:26

## 2020-06-07 RX ADMIN — PROMETHAZINE HYDROCHLORIDE 12.5 MG: 25 INJECTION INTRAMUSCULAR; INTRAVENOUS at 08:34

## 2020-06-07 RX ADMIN — BACITRACIN: 500 OINTMENT TOPICAL at 08:27

## 2020-06-07 RX ADMIN — Medication 10 MG: at 20:34

## 2020-06-07 RX ADMIN — SODIUM CHLORIDE 2 G: 900 INJECTION INTRAVENOUS at 17:18

## 2020-06-07 RX ADMIN — ENOXAPARIN SODIUM 40 MG: 40 INJECTION SUBCUTANEOUS at 08:26

## 2020-06-07 RX ADMIN — INSULIN ASPART 3 UNITS: 100 INJECTION, SOLUTION INTRAVENOUS; SUBCUTANEOUS at 08:27

## 2020-06-07 RX ADMIN — PROMETHAZINE HYDROCHLORIDE 12.5 MG: 25 INJECTION INTRAMUSCULAR; INTRAVENOUS at 14:54

## 2020-06-07 RX ADMIN — CARVEDILOL 6.25 MG: 6.25 TABLET, FILM COATED ORAL at 16:15

## 2020-06-07 RX ADMIN — FLUDROCORTISONE ACETATE 50 MCG: 0.1 TABLET ORAL at 08:26

## 2020-06-07 RX ADMIN — LACTULOSE 30 G: 10 SOLUTION ORAL at 16:06

## 2020-06-07 RX ADMIN — DIPHENHYDRAMINE HYDROCHLORIDE 25 MG: 25 CAPSULE ORAL at 20:34

## 2020-06-07 RX ADMIN — BUMETANIDE 2 MG: 1 TABLET ORAL at 18:28

## 2020-06-07 NOTE — PROGRESS NOTES
Pharmacokinetics Service Note:    Ms. Mcdermott continues on day 13 of 15 of vancomycin for her L elbow septic bursitis.  A 31 hour post infusion random level was reported as 18.4 mg/L this AM.  The serum Cr is stable at 1.28 mg/dL.  Will redose with 1 gm today to maintain adequate levels and will repeat a random level Tuesday AM if treatment continues.      Thank you.  Augusta Dobson, Pharm.D.  6/7/2020  11:34

## 2020-06-07 NOTE — PROGRESS NOTES
Interval History:     Patient Complaints: The patient thinks she diuresed a little better yesterday.  She continues to have troubles with constipation and is on stool softeners with a large dose of lactulose.  Her potassium is trended down.  Her renal function is stable.  She has been afebrile.  She has no leukocytosis.  He is on antibiotics and is not clinically septic        Vital Signs  Temp:  [97.5 °F (36.4 °C)-98.7 °F (37.1 °C)] 97.5 °F (36.4 °C)  Heart Rate:  [79-93] 90  Resp:  [16-18] 18  BP: (123-141)/(79-92) 133/85    Physical Exam:    General:           No distress      HEENT:  Pallor               Neck:  No JVD       Lungs:    Clear   Heart:  no rub       Abdomen:   Normal bowel sounds, soft non-tender, non-distended, no guarding       Extremities:  Edema                        Results Review:    I reviewed the patient's new clinical results.    Lab Results (last 24 hours)     Procedure Component Value Units Date/Time    POC Glucose Once [[id number]]  (Abnormal) Collected:  06/07/20 1606    Specimen:  Blood Updated:  06/07/20 1612     Glucose 266 mg/dL     Protein Electrophoresis, 24 Hr Urine - Urine, Clean Catch [[id number]] Collected:  06/07/20 1327    Specimen:  Urine, Clean Catch Updated:  06/07/20 1335    POC Glucose Once [[id number]]  (Abnormal) Collected:  06/07/20 1044    Specimen:  Blood Updated:  06/07/20 1050     Glucose 208 mg/dL     Basic Metabolic Panel [[id number]]  (Abnormal) Collected:  06/07/20 0750    Specimen:  Blood Updated:  06/07/20 0845     Glucose 256 mg/dL      BUN 34 mg/dL      Creatinine 1.27 mg/dL      Sodium 130 mmol/L      Potassium 5.3 mmol/L      Chloride 100 mmol/L      CO2 23.7 mmol/L      Calcium 8.5 mg/dL      eGFR Non African Amer 46 mL/min/1.73      BUN/Creatinine Ratio 26.8     Anion Gap 6.3 mmol/L     Narrative:       GFR Normal >60  Chronic Kidney Disease <60  Kidney Failure <15      CBC & Differential [[id number]] Collected:  06/07/20 0750    Specimen:  Blood  Updated:  06/07/20 0832    Narrative:       The following orders were created for panel order CBC & Differential.  Procedure                               Abnormality         Status                     ---------                               -----------         ------                     CBC Auto Differential[[id number]]        Abnormal            Final result                 Please view results for these tests on the individual orders.    CBC Auto Differential [[id number]]  (Abnormal) Collected:  06/07/20 0750    Specimen:  Blood Updated:  06/07/20 0832     WBC 7.65 10*3/mm3      RBC 2.87 10*6/mm3      Hemoglobin 7.1 g/dL      Hematocrit 24.7 %      MCV 86.1 fL      MCH 24.7 pg      MCHC 28.7 g/dL      RDW 18.6 %      RDW-SD 58.7 fl      MPV 10.2 fL      Platelets 293 10*3/mm3      Neutrophil % 57.5 %      Lymphocyte % 28.1 %      Monocyte % 8.1 %      Eosinophil % 5.0 %      Basophil % 0.9 %      Immature Grans % 0.4 %      Neutrophils, Absolute 4.40 10*3/mm3      Lymphocytes, Absolute 2.15 10*3/mm3      Monocytes, Absolute 0.62 10*3/mm3      Eosinophils, Absolute 0.38 10*3/mm3      Basophils, Absolute 0.07 10*3/mm3      Immature Grans, Absolute 0.03 10*3/mm3      nRBC 0.0 /100 WBC     Scan Slide [[id number]] Collected:  06/07/20 0750    Specimen:  Blood Updated:  06/07/20 0832     Hypochromia Mod/2+     Platelet Morphology Normal    POC Glucose Once [[id number]]  (Abnormal) Collected:  06/07/20 0709    Specimen:  Blood Updated:  06/07/20 0716     Glucose 246 mg/dL     Vancomycin, Random [[id number]]  (Normal) Collected:  06/07/20 0441    Specimen:  Blood Updated:  06/07/20 0629     Vancomycin Random 18.40 mcg/mL     Immunoglobulin Free LT Chains Blood [[id number]] Collected:  06/07/20 0441    Specimen:  Blood Updated:  06/07/20 0555    Glomerular Basement Membrane Antibodies [[id number]] Collected:  06/07/20 0441    Specimen:  Blood Updated:  06/07/20 0555    DIDIER + PE [[id number]] Collected:  06/07/20 0441    Specimen:   Blood Updated:  06/07/20 0555    Anti-DNA Antibody, Double-stranded [[id number]] Collected:  06/07/20 0441    Specimen:  Blood Updated:  06/07/20 0554    Potassium [[id number]]  (Abnormal) Collected:  06/06/20 1705    Specimen:  Blood Updated:  06/06/20 1726     Potassium 5.4 mmol/L           Imaging Results (Last 24 Hours)     ** No results found for the last 24 hours. **          Assessment and Plan:    1. Persistent Hyperkalemia  2. Severe constipation  3. SARINA on CKD3  4. DM-II  5. Essential hypertension  6. Nephrotic syndrome  7.  Iron deficiency with anemia  8.  Olecranon bursitis on antibiotics    She is normal complementemic.  She is iron deficient.  In view of her constipation I am reluctant to give her oral ferrous sulfate.  I am going to give her a single dose of intravenous iron to replete iron stores and I think I can do this comfortably as she is not bacteremic or septic.    I am going to try a single dose of metolazone with bumetanide to see if she responds to the same without affecting her renal function and to see if it helps the potassium some.  If tolerated may be we can leave her on bumetanide with alternate day metolazone.    I have also requested case management consult to get prior authorization for Veltassa for refractory hyperkalemia    Venkata Soler MD  06/07/20  17:22

## 2020-06-07 NOTE — PLAN OF CARE
Patient has been resting comfortably throughout the shift, 24 hour urine has been collected and sent to lab.  Niño catheter was removed at 1320.  Patient has no complaints at this time, will continue with current plan of care.

## 2020-06-07 NOTE — PROGRESS NOTES
[facility] HOSPITALIST PROGRESS NOTE     Patient Identification:  Name:  Angelique Mcdermott  Age:  39 y.o.  Sex:  female  :  1980  MRN:  [id number]  Visit Number:  [id number]  ROOM: [address] [place] [address]     Primary Care Provider:  Provider, No Known    Length of stay in inpatient status:  12    Subjective     Chief Compliant:    Chief Complaint   Patient presents with   • Abdominal Pain       History of Presenting Illness:    Patient reports feeling about the same as yesterday. Reports stress and anxiety. Niño was placed to gather urine for nephrology studies.     ROS:  Otherwise 10 point ROS negative other than documented above in HPI.     Objective     Current Hospital Meds:  bacitracin  Topical Q12H   buprenorphine-naloxone 1.25 tablet Sublingual Daily   carvedilol 6.25 mg Oral BID With Meals   cefTRIAXone 2 g Intravenous Q24H   cholecalciferol 50,000 Units Oral Q7 Days   cloNIDine 0.2 mg Oral Q12H   docusate sodium 100 mg Oral BID   enoxaparin 40 mg Subcutaneous Q24H   fludrocortisone 50 mcg Oral Daily   folic acid 1 mg Oral Daily   insulin aspart 0-7 Units Subcutaneous TID AC   lactobacillus acidophilus 1 capsule Oral Daily   lactulose 30 g Oral TID   lactulose 40 g Oral Once   melatonin 10 mg Oral Nightly   nystatin  Topical Q12H   polyethylene glycol 17 g Oral Daily   rOPINIRole 0.25 mg Oral Nightly   senna-docusate sodium 1 tablet Oral BID   sodium chloride 10 mL Intravenous Q12H   sodium chloride 10 mL Intravenous Q12H   sodium chloride 10 mL Intravenous Q12H   Vancomycin Pharmacy Intermittent Dosing  Does not apply Daily        Current Antimicrobial Therapy:  Anti-Infectives (From admission, onward)    Ordered     Dose/Rate Route Frequency Start Stop    20 1921  vancomycin 1 g/250 mL 0.9% NS (vial-mate)     Ordering Provider:  Chris Pichardo MD    1,000 mg  over 60 Minutes Intravenous Once 20 2100 20 2142    20 1726  Vancomycin Pharmacy Intermittent Dosing  Review      Ordering Provider:  Chris Pichardo MD     Does not apply Daily 06/05/20 1815 06/10/20 0859    06/05/20 1718  cefTRIAXone (ROCEPHIN) 2 g/100 mL 0.9% NS VTB (KARL)     Ema Michaud [facility] reviewed the order on 06/05/20 1925.   Ordering Provider:  Chris Pichardo MD    2 g  over 30 Minutes Intravenous Every 24 Hours 06/05/20 1800 06/08/20 1759    06/03/20 1616  vancomycin 1 g/250 mL 0.9% NS (vial-mate)     Ordering Provider:  Chris Pichardo MD    1,000 mg  over 60 Minutes Intravenous Once 06/03/20 2200 06/03/20 2110    06/02/20 1411  vancomycin 1 g/250 mL 0.9% NS (vial-mate)     Ordering Provider:  Chris iPchardo MD    1,000 mg  over 60 Minutes Intravenous Once 06/02/20 1700 06/02/20 1730    06/02/20 1411  cefTRIAXone (ROCEPHIN) 2 g/100 mL 0.9% NS VTB (KARL)     Ema Michaud [facility] reviewed the order on 06/03/20 1041.   Ordering Provider:  Chris Pichardo MD    2 g  over 30 Minutes Intravenous Every 24 Hours 06/02/20 1600 06/04/20 1845    06/02/20 1411  Vancomycin Pharmacy Intermittent Dosing     Ordering Provider:  Chris Pichardo MD     Does not apply Daily 06/02/20 1600 06/05/20 0859    05/31/20 2130  Vancomycin Pharmacy Intermittent Dosing     Ordering Provider:  Jeremiah Medina DO     Does not apply Daily 06/01/20 0900 06/02/20 0859    05/31/20 0925  vancomycin 1 g/250 mL 0.9% NS (vial-mate)     Ordering Provider:  Jeremiah Medina DO    1,000 mg  over 60 Minutes Intravenous Once 05/31/20 1100 05/31/20 1251    05/29/20 0741  vancomycin 1 g/250 mL 0.9% NS (vial-mate)     Ordering Provider:  Jeremiah Medina DO    1,000 mg  over 60 Minutes Intravenous Once 05/29/20 0900 05/29/20 1010    05/25/20 0828  cefTRIAXone (ROCEPHIN) 2 g/100 mL 0.9% NS VTB (KARL)     Ordering Provider:  Jeremiah Medina DO    2 g  over 30 Minutes Intravenous Every 24 Hours 05/26/20 0700 06/01/20 0611    05/25/20 0603  cefTRIAXone (ROCEPHIN) 2 g/100 mL 0.9% NS VTB (KARL)     Ordering Provider:  Nick  NEO Roe    2 g  over 30 Minutes Intravenous Once 05/25/20 0605 05/25/20 0739        Current Diuretic Therapy:  Diuretics (From admission, onward)    Ordered     Dose/Rate Route Frequency Start Stop    06/06/20 1542  furosemide (LASIX) injection 40 mg     Ordering Provider:  Venkata Soler MD    40 mg Intravenous Once 06/06/20 1630 06/06/20 1624        ----------------------------------------------------------------------------------------------------------------------  Vital Signs:  Temp:  [98 °F (36.7 °C)-98.7 °F (37.1 °C)] 98.3 °F (36.8 °C)  Heart Rate:  [] 93  Resp:  [18] 18  BP: (124-148)/(79-96) 132/85  SpO2:  [90 %-96 %] 93 %  on   ;   Device (Oxygen Therapy): room air  Body mass index is 26.19 kg/m².    Wt Readings from Last 3 Encounters:   06/06/20 75.8 kg (167 lb 3.2 oz)   10/16/18 59 kg (130 lb)     Intake & Output (last 3 days)       06/04 0701 - 06/05 0700 06/05 0701 - 06/06 0700 06/06 0701 - 06/07 0700    P.O. 2760 960 960    I.V. (mL/kg) 90.4 (1.2) 493.5 (6.5)     IV Piggyback       Total Intake(mL/kg) 2850.4 (37.5) 1453.5 (19.2) 960 (12.7)    Urine (mL/kg/hr)   1200 (1.1)    Stool   0    Total Output   1200    Net +2850.4 +1453.5 -240           Urine Unmeasured Occurrence 5 x 4 x 2 x    Stool Unmeasured Occurrence 1 x  1 x        Diet Regular; Consistent Carbohydrate, Low Potassium  ----------------------------------------------------------------------------------------------------------------------  Physical exam:  Constitutional:  Well-developed and well-nourished.  No respiratory distress.      HENT:  Head:  Normocephalic and atraumatic.  Mouth:  Moist mucous membranes.    Eyes:  Conjunctivae and EOM are normal. No scleral icterus.    Neck:  Neck supple.  No JVD present.    Cardiovascular:  Normal rate, regular rhythm and normal heart sounds with no murmur.  Pulmonary/Chest:  No respiratory distress, no wheezes, no crackles, with normal breath sounds and good air  movement.  Abdominal:  Soft.  Bowel sounds are normal.  No distension and no tenderness.   Musculoskeletal:  L AKA, L elbow with some swelling/redness that is minimal now. Full ROM.   Neurological:  Alert and oriented to person, place, and time.  No cranial nerve deficit.  No tongue deviation.  No facial droop.  No slurred speech.   Skin:  Skin is warm and dry. No rash noted. No pallor.   Peripheral vascular:  Pulses in all 4 extremities with no clubbing, no cyanosis, no edema.  ----------------------------------------------------------------------------------------------------------------------  Tele:    ----------------------------------------------------------------------------------------------------------------------  Results from last 7 days   Lab Units 06/05/20  0324 06/04/20  0423 06/02/20  0116   WBC 10*3/mm3 6.91 7.32 7.33   HEMOGLOBIN g/dL 6.9* 6.9* 7.5*   HEMATOCRIT % 24.0* 24.9* 26.0*   MCV fL 87.3 88.9 86.1   MCHC g/dL 28.8* 27.7* 28.8*   PLATELETS 10*3/mm3 285 305 239         Results from last 7 days   Lab Units 06/06/20  1705 06/06/20  1447 06/06/20  0812 06/05/20  0324 06/04/20  0423  06/03/20  0406  06/02/20  0511  06/01/20  0715 05/31/20  0049   SODIUM mmol/L  --   --  128* 132* 133*  --  133*  --   --    < > 129* 133*   POTASSIUM mmol/L 5.4* 5.8* 6.0* 5.6* 5.3*   < > 6.4*   < >  --    < > 6.0* 6.0*   MAGNESIUM mg/dL  --   --   --   --   --   --  2.4  --  1.7  --   --   --    CHLORIDE mmol/L  --   --  101 102 104  --  105  --   --    < > 101 103   CO2 mmol/L  --   --  19.4* 22.3 23.1  --  21.8*  --   --    < > 20.8* 22.5   BUN mg/dL  --   --  37* 34* 36*  --  42*  --   --    < > 41* 34*   CREATININE mg/dL  --   --  1.28* 1.28* 1.36*  --  1.39*  --   --    < > 1.23* 1.18*   EGFR IF NONAFRICN AM mL/min/1.73  --   --  46* 46* 43*  --  42*  --   --    < > 48* 50*   CALCIUM mg/dL  --   --  8.4* 8.1* 8.4*  --  8.2*  --   --    < > 8.2* 8.2*   GLUCOSE mg/dL  --   --  227* 337* 47*  --  221*  --   --    <  > 104* 206*   ALBUMIN g/dL  --   --   --   --  2.45*  --   --   --   --   --  2.16* 2.20*   BILIRUBIN mg/dL  --   --   --   --  <0.2*  --   --   --   --   --  <0.2* <0.2*   ALK PHOS U/L  --   --   --   --  157*  --   --   --   --   --  142* 147*   AST (SGOT) U/L  --   --   --   --  35*  --   --   --   --   --  28 30   ALT (SGPT) U/L  --   --   --   --  24  --   --   --   --   --  19 19    < > = values in this interval not displayed.   Estimated Creatinine Clearance: 61.5 mL/min (A) (by C-G formula based on SCr of 1.28 mg/dL (H)).  No results found for: AMMONIA              Glucose   Date/Time Value Ref Range Status   06/06/2020 1613 212 (H) 70 - 130 mg/dL Final   06/06/2020 1104 243 (H) 70 - 130 mg/dL Final   06/06/2020 0628 240 (H) 70 - 130 mg/dL Final   06/05/2020 2051 251 (H) 70 - 130 mg/dL Final   06/05/2020 1623 221 (H) 70 - 130 mg/dL Final   06/05/2020 1050 226 (H) 70 - 130 mg/dL Final   06/05/2020 0814 281 (H) 70 - 130 mg/dL Final   06/05/2020 0657 301 (H) 70 - 130 mg/dL Final     No results found for: TSH, FREET4  No results found for: PREGTESTUR, PREGSERUM, HCG, HCGQUANT  Pain Management Panel     Pain Management Panel Latest Ref Rng & Units 6/4/2020 5/26/2020    CREATININE UR mg/dL 13.4 39.3    AMPHETAMINES SCREEN, URINE Negative - -    BARBITURATES SCREEN Negative - -    BENZODIAZEPINE SCREEN, URINE Negative - -    BUPRENORPHINEUR Negative - -    COCAINE SCREEN, URINE Negative - -    METHADONE SCREEN, URINE Negative - -        Brief Urine Lab Results  (Last result in the past 365 days)      Color   Clarity   Blood   Leuk Est   Nitrite   Protein   CREAT   Urine HCG        06/04/20 1112             13.4           No results found for: BLOODCX  No results found for: URINECX  No results found for: WOUNDCX  No results found for: STOOLCX  No results found for: RESPCX  No results found for: AFBCX        I have personally looked at the labs and they are summarized  above.  ----------------------------------------------------------------------------------------------------------------------  Detailed radiology reports for the last 24 hours:    Imaging Results (Last 24 Hours)     ** No results found for the last 24 hours. **        Assessment & Plan    #SARINA vs. 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 0.97 om 10/18 only prior value  - Cr 1.28 this AM. Stable.   - Na down to 128 from 132 yesterday.   - Hyperkalemia persistent even with aggressive bowel regimen and bowel movements.   - Expressed importance for patient to comply with medications for hyperkalemia as condition is life threatening.   - Etiology of hyperkalemia being evaluated by nephrology. Given hyponatremia, hyperkalemia, low normal random cortisol, urine anion gap 33. 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 Fludrocortisone added by nephrology. Aldosterone and renin levels pending. Appreciate recs.   - K trended down today 6.6=>6.1=>5.4=>5.6=>6.0=>5.8 Given improvement with steroids, kayexalate had been discontinued but I gave another dose this Am for k of 6.0.   - Nephrology ordered a dose of lasix. Will monitor response. Autoimmune w/u sent.   - Continue to monitor.     #UTI  - Urine culture revealed normal jefferson. On ceftriaxone as below.      #L elbow effusion w/ concern for bursitis   - Patient declined surgical evaluation. Septic bursitis on differential.  Continue vancomycin and rocephin for an extended 14 day course. (11/14) Can deescalate when medically ready for discharge. Patient declined surgery earlier in hospitalization but is now agreeable for potential surgery. Ortho aware and evaluated. They report patient no longer needs surgery.   - Improving.      #DM II  - A1C 11.1%, Diabetes educator consulted   - SSI , titrate as needed     #Mild troponin leak  - No acute ischemic changes on EKG. Normalized on repeat.     #Stable normocytic anemia     #Substance  abuse   - UDS positive for amphetamines and buprenorphine. Pt is prescribed Suboxone and admits to recent methamphetamine abuse.  - Plans to continue cessation at discharge. Has psychiatry f/u scheduled for 6/11.     #Cirrhosis w/ hx of hepatitis C  - Appears compensated. Outpatient hepatology f/u     #Constipation   #N/V  - Continue to escalate bowel regimen as able. Patient declining enemas.     #Hypomagnesemia   - Replaced     #Insomnia  - Will be conservative given hx of drug abuse.     F: PO  E: Replace as needed   N: CC    Code status: Full     Dispo: Pending clinical improvement      VTE Prophylaxis:   Mechanical Order History:     None      Pharmalogical Order History:     Ordered     Dose Route Frequency Stop    05/25/20 0727  enoxaparin (LOVENOX) syringe 40 mg      40 mg SC Every 24 Hours --    05/25/20 0717  Pharmacy to Dose enoxaparin (LOVENOX)  Status:  Discontinued     Question:  Indication of use  Answer:  Prophylaxis    -- XX Continuous PRN 05/25/20 0727          Chris Pichardo MD  [facility]  06/06/20  21:00

## 2020-06-07 NOTE — PROGRESS NOTES
[facility] HOSPITALIST PROGRESS NOTE     Patient Identification:  Name:  Angelique Mcdermott  Age:  39 y.o.  Sex:  female  :  1980  MRN:  [id number]  Visit Number:  [id number]  ROOM: [address] [place] [address]     Primary Care Provider:  Provider, No Known    Length of stay in inpatient status:  13    Subjective     Chief Compliant:    Chief Complaint   Patient presents with   • Abdominal Pain       History of Presenting Illness:    Patient denies any new complaints. Was eating lunch on my exam. Reports arm continues to feel better. Reports she continues to have multiple BMs daily.     ROS:  Denies fevers/chills. Denies chest pain.   Otherwise 10 point ROS negative other than documented above in HPI.     Objective     Current Hospital Meds:  bacitracin  Topical Q12H   bumetanide 2 mg Oral Once   buprenorphine-naloxone 1.25 tablet Sublingual Daily   carvedilol 6.25 mg Oral BID With Meals   cholecalciferol 50,000 Units Oral Q7 Days   cloNIDine 0.2 mg Oral Q12H   docusate sodium 100 mg Oral BID   enoxaparin 40 mg Subcutaneous Q24H   fludrocortisone 50 mcg Oral Daily   folic acid 1 mg Oral Daily   insulin aspart 0-7 Units Subcutaneous TID AC   iron sucrose (VENOFER) IVPB 300 mg Intravenous Once   lactobacillus acidophilus 1 capsule Oral Daily   lactulose 30 g Oral TID   lactulose 40 g Oral Once   melatonin 10 mg Oral Nightly   metOLazone 5 mg Oral Once   nystatin  Topical Q12H   polyethylene glycol 17 g Oral Daily   rOPINIRole 0.25 mg Oral Nightly   senna-docusate sodium 1 tablet Oral BID   sodium chloride 10 mL Intravenous Q12H   sodium chloride 10 mL Intravenous Q12H   sodium chloride 10 mL Intravenous Q12H   Vancomycin Pharmacy Intermittent Dosing  Does not apply Daily        Current Antimicrobial Therapy:  Anti-Infectives (From admission, onward)    Ordered     Dose/Rate Route Frequency Start Stop    20 1133  vancomycin 1 g/250 mL 0.9% NS (vial-mate)     Ordering Provider:  Chris Pichardo MD    1,000  mg  over 60 Minutes Intravenous Once 06/07/20 1300 06/07/20 1318    06/05/20 1921  vancomycin 1 g/250 mL 0.9% NS (vial-mate)     Ordering Provider:  Chris Pichardo MD    1,000 mg  over 60 Minutes Intravenous Once 06/05/20 2100 06/05/20 2142    06/05/20 1726  Vancomycin Pharmacy Intermittent Dosing     Augusta Dobson, [facility] reviewed the order on 06/07/20 1133.   Ordering Provider:  Chris Pichardo MD     Does not apply Daily 06/05/20 1815 06/10/20 0859    06/05/20 1718  cefTRIAXone (ROCEPHIN) 2 g/100 mL 0.9% NS VTB (KARL)     Ema Michaud [facility] reviewed the order on 06/05/20 1925.   Ordering Provider:  Chris Pichardo MD    2 g  over 30 Minutes Intravenous Every 24 Hours 06/05/20 1800 06/07/20 1748    06/03/20 1616  vancomycin 1 g/250 mL 0.9% NS (vial-mate)     Ordering Provider:  Chris Pichardo MD    1,000 mg  over 60 Minutes Intravenous Once 06/03/20 2200 06/03/20 2110    06/02/20 1411  vancomycin 1 g/250 mL 0.9% NS (vial-mate)     Ordering Provider:  Chris Pichardo MD    1,000 mg  over 60 Minutes Intravenous Once 06/02/20 1700 06/02/20 1730    06/02/20 1411  cefTRIAXone (ROCEPHIN) 2 g/100 mL 0.9% NS VTB (KARL)     Ema Michaud [facility] reviewed the order on 06/03/20 1041.   Ordering Provider:  Chris Pichardo MD    2 g  over 30 Minutes Intravenous Every 24 Hours 06/02/20 1600 06/04/20 1845    06/02/20 1411  Vancomycin Pharmacy Intermittent Dosing     Ordering Provider:  Chris Pichardo MD     Does not apply Daily 06/02/20 1600 06/05/20 0859    05/31/20 2130  Vancomycin Pharmacy Intermittent Dosing     Ordering Provider:  Jeremiah Medina DO     Does not apply Daily 06/01/20 0900 06/02/20 0859    05/31/20 0925  vancomycin 1 g/250 mL 0.9% NS (vial-mate)     Ordering Provider:  Jeremiah Medina DO    1,000 mg  over 60 Minutes Intravenous Once 05/31/20 1100 05/31/20 1251    05/29/20 0741  vancomycin 1 g/250 mL 0.9% NS (vial-mate)     Ordering Provider:  Jeremiah Medina DO    1,000  mg  over 60 Minutes Intravenous Once 05/29/20 0900 05/29/20 1010    05/25/20 0828  cefTRIAXone (ROCEPHIN) 2 g/100 mL 0.9% NS VTB (KARL)     Ordering Provider:  Jeremiah Medina DO    2 g  over 30 Minutes Intravenous Every 24 Hours 05/26/20 0700 06/01/20 0611    05/25/20 0603  cefTRIAXone (ROCEPHIN) 2 g/100 mL 0.9% NS VTB (KARL)     Ordering Provider:  Sharon Romero PA    2 g  over 30 Minutes Intravenous Once 05/25/20 0605 05/25/20 0739        Current Diuretic Therapy:  Diuretics (From admission, onward)    Ordered     Dose/Rate Route Frequency Start Stop    06/07/20 1718  metOLazone (ZAROXOLYN) tablet 5 mg     Ordering Provider:  Venkata Soler MD    5 mg Oral Once 06/07/20 1900      06/07/20 1718  bumetanide (BUMEX) tablet 2 mg     Ordering Provider:  Venkata Soler MD    2 mg Oral Once 06/07/20 1900      06/06/20 1542  furosemide (LASIX) injection 40 mg     Ordering Provider:  Venkata Soler MD    40 mg Intravenous Once 06/06/20 1630 06/06/20 1624        ----------------------------------------------------------------------------------------------------------------------  Vital Signs:  Temp:  [97.5 °F (36.4 °C)-98.7 °F (37.1 °C)] 97.5 °F (36.4 °C)  Heart Rate:  [79-93] 90  Resp:  [16-18] 18  BP: (123-141)/(79-92) 133/85  SpO2:  [93 %-97 %] 96 %  on   ;   Device (Oxygen Therapy): room air  Body mass index is 25.76 kg/m².    Wt Readings from Last 3 Encounters:   06/07/20 74.6 kg (164 lb 8 oz)   10/16/18 59 kg (130 lb)     Intake & Output (last 3 days)       06/04 0701 - 06/05 0700 06/05 0701 - 06/06 0700 06/06 0701 - 06/07 0700 06/07 0701 - 06/08 0700    P.O. 2760 960 2210 600    I.V. (mL/kg) 90.4 (1.2) 493.5 (6.5) 248.2 (3.3)     Other   3650     IV Piggyback        Total Intake(mL/kg) 2850.4 (37.5) 1453.5 (19.2) 6108.2 (81.9) 600 (8)    Urine (mL/kg/hr)   1200 (0.7) 1700 (2.1)    Stool   0     Total Output   1200 1700    Net +2850.4 +1453.5 +4908.2 -1100            Urine Unmeasured Occurrence  5 x 4 x 2 x     Stool Unmeasured Occurrence 1 x  5 x         Diet Regular; Consistent Carbohydrate, Low Potassium  ----------------------------------------------------------------------------------------------------------------------  Physical exam:  Constitutional:  Well-developed and well-nourished.  No respiratory distress.      HENT:  Head:  Normocephalic and atraumatic.  Mouth:  Moist mucous membranes.    Eyes:  Conjunctivae and EOM are normal. No scleral icterus.    Neck:  Neck supple.  No JVD present.    Cardiovascular:  Normal rate, regular rhythm and normal heart sounds with no murmur.  Pulmonary/Chest:  No respiratory distress, no wheezes, no crackles, with normal breath sounds and good air movement.  Abdominal:  Soft.  Bowel sounds are normal.  No distension and no tenderness.   Musculoskeletal:  L AKA, L elbow with some swelling/redness that is minimal now. Full ROM.   Neurological:  Alert and oriented to person, place, and time.  No cranial nerve deficit.  No tongue deviation.  No facial droop.  No slurred speech.   Skin:  Skin is warm and dry. No rash noted. No pallor.   Peripheral vascular:  Pulses in all 4 extremities with no clubbing, no cyanosis, no edema.----------------------------------------------------------------------------------------------------------------------  Tele:    ----------------------------------------------------------------------------------------------------------------------  Results from last 7 days   Lab Units 06/07/20  0750 06/05/20 0324 06/04/20  0423   WBC 10*3/mm3 7.65 6.91 7.32   HEMOGLOBIN g/dL 7.1* 6.9* 6.9*   HEMATOCRIT % 24.7* 24.0* 24.9*   MCV fL 86.1 87.3 88.9   MCHC g/dL 28.7* 28.8* 27.7*   PLATELETS 10*3/mm3 293 285 305         Results from last 7 days   Lab Units 06/07/20  0750 06/06/20  1705 06/06/20  1447 06/06/20  0812 06/05/20  0324 06/04/20  0423  06/03/20  0406  06/02/20  0511  06/01/20  0715   SODIUM mmol/L 130*  --   --  128* 132* 133*  --  133*  --    --    < > 129*   POTASSIUM mmol/L 5.3* 5.4* 5.8* 6.0* 5.6* 5.3*   < > 6.4*   < >  --    < > 6.0*   MAGNESIUM mg/dL  --   --   --   --   --   --   --  2.4  --  1.7  --   --    CHLORIDE mmol/L 100  --   --  101 102 104  --  105  --   --    < > 101   CO2 mmol/L 23.7  --   --  19.4* 22.3 23.1  --  21.8*  --   --    < > 20.8*   BUN mg/dL 34*  --   --  37* 34* 36*  --  42*  --   --    < > 41*   CREATININE mg/dL 1.27*  --   --  1.28* 1.28* 1.36*  --  1.39*  --   --    < > 1.23*   EGFR IF NONAFRICN AM mL/min/1.73 46*  --   --  46* 46* 43*  --  42*  --   --    < > 48*   CALCIUM mg/dL 8.5*  --   --  8.4* 8.1* 8.4*  --  8.2*  --   --    < > 8.2*   GLUCOSE mg/dL 256*  --   --  227* 337* 47*  --  221*  --   --    < > 104*   ALBUMIN g/dL  --   --   --   --   --  2.45*  --   --   --   --   --  2.16*   BILIRUBIN mg/dL  --   --   --   --   --  <0.2*  --   --   --   --   --  <0.2*   ALK PHOS U/L  --   --   --   --   --  157*  --   --   --   --   --  142*   AST (SGOT) U/L  --   --   --   --   --  35*  --   --   --   --   --  28   ALT (SGPT) U/L  --   --   --   --   --  24  --   --   --   --   --  19    < > = values in this interval not displayed.   Estimated Creatinine Clearance: 61.5 mL/min (A) (by C-G formula based on SCr of 1.27 mg/dL (H)).  No results found for: AMMONIA              Glucose   Date/Time Value Ref Range Status   06/07/2020 1606 266 (H) 70 - 130 mg/dL Final   06/07/2020 1044 208 (H) 70 - 130 mg/dL Final   06/07/2020 0709 246 (H) 70 - 130 mg/dL Final   06/06/2020 1613 212 (H) 70 - 130 mg/dL Final   06/06/2020 1104 243 (H) 70 - 130 mg/dL Final   06/06/2020 0628 240 (H) 70 - 130 mg/dL Final   06/05/2020 2051 251 (H) 70 - 130 mg/dL Final   06/05/2020 1623 221 (H) 70 - 130 mg/dL Final     No results found for: TSH, FREET4  No results found for: PREGTESTUR, PREGSERUM, HCG, HCGQUANT  Pain Management Panel     Pain Management Panel Latest Ref Rng & Units 6/4/2020 5/26/2020    CREATININE UR mg/dL 13.4 39.3    AMPHETAMINES  SCREEN, URINE Negative - -    BARBITURATES SCREEN Negative - -    BENZODIAZEPINE SCREEN, URINE Negative - -    BUPRENORPHINEUR Negative - -    COCAINE SCREEN, URINE Negative - -    METHADONE SCREEN, URINE Negative - -        Brief Urine Lab Results  (Last result in the past 365 days)      Color   Clarity   Blood   Leuk Est   Nitrite   Protein   CREAT   Urine HCG        06/04/20 1112             13.4           No results found for: BLOODCX  No results found for: URINECX  No results found for: WOUNDCX  No results found for: STOOLCX  No results found for: RESPCX  No results found for: AFBCX        I have personally looked at the labs and they are summarized above.  ----------------------------------------------------------------------------------------------------------------------  Detailed radiology reports for the last 24 hours:    Imaging Results (Last 24 Hours)     ** No results found for the last 24 hours. **        Assessment & Plan    #SARINA vs. 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 0.97 om 10/18 only prior value  - Cr 1.27 this AM. Stable.   - Na stable at 130  - Hyperkalemia persistent even with aggressive bowel regimen and bowel movements.   - Expressed importance for patient to comply with medications for hyperkalemia as condition is life threatening.   - Etiology of hyperkalemia being evaluated by nephrology. Given hyponatremia, hyperkalemia, low normal random cortisol, urine anion gap 33. 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 Fludrocortisone added by nephrology. Aldosterone and renin levels pending. Appreciate recs.   - K trended down today 6.6=>6.1=>5.4=>5.6=>6.0=>5.8=>5.3 Given improvement with steroids, kayexalate had been discontinued. Last dose given 6/6.   - Nephrology ordered a dose bumex w/ metolazone. They have also requested a prior authorization for veltassa for refractory hyperkalemia.   - Continue to monitor.     #UTI  - Urine culture revealed  normal jefferson. On ceftriaxone as below. Has finished treatment.     #L elbow effusion w/ concern for bursitis   - Patient declined surgical evaluation. Septic bursitis on differential.  Continue vancomycin and rocephin for an extended 14 day course. (12/14) Can deescalate when medically ready for discharge. Patient declined surgery earlier in hospitalization but is now agreeable for potential surgery. Ortho aware and evaluated. They report patient no longer needs surgery.   - Improving.      #DM II  - A1C 11.1%, Diabetes educator consulted   - SSI , titrate as needed     #Iron deficency anemia   - IV iron per nephrology     #Mild troponin leak  - No acute ischemic changes on EKG. Normalized on repeat.     #Stable normocytic anemia     #Substance abuse   - UDS positive for amphetamines and buprenorphine. Pt is prescribed Suboxone and admits to recent methamphetamine abuse.  - Plans to continue cessation at discharge. Has psychiatry f/u scheduled for 6/11.     #Cirrhosis w/ hx of hepatitis C  - Appears compensated. Outpatient hepatology f/u     #Constipation   #N/V  - Continue to escalate bowel regimen as able. Patient declining enemas.     #Hypomagnesemia   - Replaced     #Insomnia  - Will be conservative given hx of drug abuse.     F: PO  E: Replace as needed   N: CC, low K    Code status: Full     Dispo: Pending clinical improvement    VTE Prophylaxis:   Mechanical Order History:     None      Pharmalogical Order History:     Ordered     Dose Route Frequency Stop    05/25/20 0727  enoxaparin (LOVENOX) syringe 40 mg      40 mg SC Every 24 Hours --    05/25/20 0717  Pharmacy to Dose enoxaparin (LOVENOX)  Status:  Discontinued     Question:  Indication of use  Answer:  Prophylaxis    -- XX Continuous PRN 05/25/20 0727          Chris Pichardo MD  [facility]  06/07/20  17:50

## 2020-06-07 NOTE — PLAN OF CARE
Patient had some complaints of nausea gave meds as ordered. No other complaints at this time, will continue to monitor.

## 2020-06-08 LAB
ALBUMIN SERPL-MCNC: 2.5 G/DL (ref 2.9–4.4)
ALBUMIN/GLOB SERPL: 0.5 {RATIO} (ref 0.7–1.7)
ALPHA1 GLOB FLD ELPH-MCNC: 0.2 G/DL (ref 0–0.4)
ALPHA2 GLOB SERPL ELPH-MCNC: 1 G/DL (ref 0.4–1)
ANA SER QL: NEGATIVE
ANION GAP SERPL CALCULATED.3IONS-SCNC: 5.9 MMOL/L (ref 5–15)
ANISOCYTOSIS BLD QL: NORMAL
B-GLOBULIN SERPL ELPH-MCNC: 1 G/DL (ref 0.7–1.3)
BASOPHILS # BLD AUTO: 0.08 10*3/MM3 (ref 0–0.2)
BASOPHILS NFR BLD AUTO: 1.1 % (ref 0–1.5)
BUN BLD-MCNC: 34 MG/DL (ref 6–20)
BUN/CREAT SERPL: 27.9 (ref 7–25)
C-ANCA TITR SER IF: NORMAL TITER
CALCIUM SPEC-SCNC: 8.4 MG/DL (ref 8.6–10.5)
CHLORIDE SERPL-SCNC: 96 MMOL/L (ref 98–107)
CO2 SERPL-SCNC: 27.1 MMOL/L (ref 22–29)
CREAT BLD-MCNC: 1.22 MG/DL (ref 0.57–1)
DEPRECATED RDW RBC AUTO: 57.7 FL (ref 37–54)
DSDNA AB SER-ACNC: 2 IU/ML (ref 0–9)
EOSINOPHIL # BLD AUTO: 0.4 10*3/MM3 (ref 0–0.4)
EOSINOPHIL NFR BLD AUTO: 5.6 % (ref 0.3–6.2)
ERYTHROCYTE [DISTWIDTH] IN BLOOD BY AUTOMATED COUNT: 18.1 % (ref 12.3–15.4)
GAMMA GLOB SERPL ELPH-MCNC: 2.7 G/DL (ref 0.4–1.8)
GFR SERPL CREATININE-BSD FRML MDRD: 49 ML/MIN/1.73
GLOBULIN SER CALC-MCNC: 4.9 G/DL (ref 2.2–3.9)
GLUCOSE BLD-MCNC: 326 MG/DL (ref 65–99)
GLUCOSE BLDC GLUCOMTR-MCNC: 213 MG/DL (ref 70–130)
GLUCOSE BLDC GLUCOMTR-MCNC: 257 MG/DL (ref 70–130)
GLUCOSE BLDC GLUCOMTR-MCNC: 318 MG/DL (ref 70–130)
GLUCOSE BLDC GLUCOMTR-MCNC: 356 MG/DL (ref 70–130)
HCT VFR BLD AUTO: 25 % (ref 34–46.6)
HGB BLD-MCNC: 7.1 G/DL (ref 12–15.9)
HYPOCHROMIA BLD QL: NORMAL
IMM GRANULOCYTES # BLD AUTO: 0.03 10*3/MM3 (ref 0–0.05)
IMM GRANULOCYTES NFR BLD AUTO: 0.4 % (ref 0–0.5)
KAPPA LC SERPL-MCNC: 210.6 MG/L (ref 3.3–19.4)
KAPPA LC/LAMBDA SER: 1.33 {RATIO} (ref 0.26–1.65)
LAMBDA LC FREE SERPL-MCNC: 158.5 MG/L (ref 5.7–26.3)
LYMPHOCYTES # BLD AUTO: 2.03 10*3/MM3 (ref 0.7–3.1)
LYMPHOCYTES NFR BLD AUTO: 28.3 % (ref 19.6–45.3)
Lab: ABNORMAL
M-SPIKE: ABNORMAL G/DL
MCH RBC QN AUTO: 24.6 PG (ref 26.6–33)
MCHC RBC AUTO-ENTMCNC: 28.4 G/DL (ref 31.5–35.7)
MCV RBC AUTO: 86.5 FL (ref 79–97)
MONOCYTES # BLD AUTO: 0.59 10*3/MM3 (ref 0.1–0.9)
MONOCYTES NFR BLD AUTO: 8.2 % (ref 5–12)
MYELOPEROXIDASE AB SER-ACNC: <9 U/ML (ref 0–9)
MYELOPEROXIDASE AB SER-ACNC: <9 U/ML (ref 0–9)
NEUTROPHILS # BLD AUTO: 4.04 10*3/MM3 (ref 1.7–7)
NEUTROPHILS NFR BLD AUTO: 56.4 % (ref 42.7–76)
NRBC BLD AUTO-RTO: 0 /100 WBC (ref 0–0.2)
P-ANCA ATYPICAL TITR SER IF: NORMAL TITER
P-ANCA TITR SER IF: NORMAL TITER
PLAT MORPH BLD: NORMAL
PLATELET # BLD AUTO: 301 10*3/MM3 (ref 140–450)
PMV BLD AUTO: 10 FL (ref 6–12)
POTASSIUM BLD-SCNC: 5 MMOL/L (ref 3.5–5.2)
PROT PATTERN SERPL ELPH-IMP: ABNORMAL
PROT SERPL-MCNC: 7.4 G/DL (ref 6–8.5)
PROTEINASE3 AB SER IA-ACNC: <3.5 U/ML (ref 0–3.5)
PROTEINASE3 AB SER IA-ACNC: <3.5 U/ML (ref 0–3.5)
RBC # BLD AUTO: 2.89 10*6/MM3 (ref 3.77–5.28)
SODIUM BLD-SCNC: 129 MMOL/L (ref 136–145)
WBC NRBC COR # BLD: 7.17 10*3/MM3 (ref 3.4–10.8)

## 2020-06-08 PROCEDURE — 63710000001 INSULIN DETEMIR PER 5 UNITS: Performed by: INTERNAL MEDICINE

## 2020-06-08 PROCEDURE — 99232 SBSQ HOSP IP/OBS MODERATE 35: CPT | Performed by: INTERNAL MEDICINE

## 2020-06-08 PROCEDURE — 82962 GLUCOSE BLOOD TEST: CPT

## 2020-06-08 PROCEDURE — 63710000001 INSULIN ASPART PER 5 UNITS: Performed by: INTERNAL MEDICINE

## 2020-06-08 PROCEDURE — 25010000002 HYDRALAZINE PER 20 MG: Performed by: INTERNAL MEDICINE

## 2020-06-08 PROCEDURE — 97530 THERAPEUTIC ACTIVITIES: CPT

## 2020-06-08 PROCEDURE — 25010000002 PROMETHAZINE PER 50 MG: Performed by: INTERNAL MEDICINE

## 2020-06-08 PROCEDURE — 97110 THERAPEUTIC EXERCISES: CPT

## 2020-06-08 PROCEDURE — 85007 BL SMEAR W/DIFF WBC COUNT: CPT | Performed by: INTERNAL MEDICINE

## 2020-06-08 PROCEDURE — 63710000001 DIPHENHYDRAMINE PER 50 MG: Performed by: INTERNAL MEDICINE

## 2020-06-08 PROCEDURE — 94799 UNLISTED PULMONARY SVC/PX: CPT

## 2020-06-08 PROCEDURE — 85025 COMPLETE CBC W/AUTO DIFF WBC: CPT | Performed by: INTERNAL MEDICINE

## 2020-06-08 PROCEDURE — 25010000002 DIPHENHYDRAMINE PER 50 MG: Performed by: INTERNAL MEDICINE

## 2020-06-08 PROCEDURE — 25010000002 ENOXAPARIN PER 10 MG: Performed by: INTERNAL MEDICINE

## 2020-06-08 PROCEDURE — 80048 BASIC METABOLIC PNL TOTAL CA: CPT | Performed by: INTERNAL MEDICINE

## 2020-06-08 RX ORDER — DIPHENHYDRAMINE HYDROCHLORIDE 50 MG/ML
25 INJECTION INTRAMUSCULAR; INTRAVENOUS ONCE
Status: COMPLETED | OUTPATIENT
Start: 2020-06-08 | End: 2020-06-08

## 2020-06-08 RX ORDER — BUMETANIDE 1 MG/1
2 TABLET ORAL ONCE
Status: COMPLETED | OUTPATIENT
Start: 2020-06-08 | End: 2020-06-08

## 2020-06-08 RX ADMIN — ROPINIROLE HYDROCHLORIDE 0.25 MG: 0.25 TABLET, FILM COATED ORAL at 19:49

## 2020-06-08 RX ADMIN — LACTULOSE 30 G: 10 SOLUTION ORAL at 15:14

## 2020-06-08 RX ADMIN — BUPRENORPHINE HYDROCHLORIDE AND NALOXONE HYDROCHLORIDE 1.25 TABLET: 8; 2 TABLET SUBLINGUAL at 09:38

## 2020-06-08 RX ADMIN — TIZANIDINE 4 MG: 4 TABLET ORAL at 09:38

## 2020-06-08 RX ADMIN — DIPHENHYDRAMINE HYDROCHLORIDE 25 MG: 25 CAPSULE ORAL at 20:47

## 2020-06-08 RX ADMIN — ENOXAPARIN SODIUM 40 MG: 40 INJECTION SUBCUTANEOUS at 08:50

## 2020-06-08 RX ADMIN — FOLIC ACID 1 MG: 1 TABLET ORAL at 08:51

## 2020-06-08 RX ADMIN — CLONIDINE HYDROCHLORIDE 0.2 MG: 0.2 TABLET ORAL at 19:49

## 2020-06-08 RX ADMIN — DIPHENHYDRAMINE HYDROCHLORIDE 25 MG: 50 INJECTION, SOLUTION INTRAMUSCULAR; INTRAVENOUS at 11:25

## 2020-06-08 RX ADMIN — NYSTATIN: 100000 POWDER TOPICAL at 08:52

## 2020-06-08 RX ADMIN — CLONIDINE HYDROCHLORIDE 0.2 MG: 0.2 TABLET ORAL at 08:51

## 2020-06-08 RX ADMIN — INSULIN ASPART 5 UNITS: 100 INJECTION, SOLUTION INTRAVENOUS; SUBCUTANEOUS at 17:00

## 2020-06-08 RX ADMIN — CARVEDILOL 6.25 MG: 6.25 TABLET, FILM COATED ORAL at 08:50

## 2020-06-08 RX ADMIN — PROMETHAZINE HYDROCHLORIDE 12.5 MG: 25 INJECTION INTRAMUSCULAR; INTRAVENOUS at 15:24

## 2020-06-08 RX ADMIN — BACITRACIN: 500 OINTMENT TOPICAL at 08:52

## 2020-06-08 RX ADMIN — CARVEDILOL 6.25 MG: 6.25 TABLET, FILM COATED ORAL at 17:00

## 2020-06-08 RX ADMIN — TIZANIDINE 4 MG: 4 TABLET ORAL at 19:49

## 2020-06-08 RX ADMIN — INSULIN ASPART 6 UNITS: 100 INJECTION, SOLUTION INTRAVENOUS; SUBCUTANEOUS at 08:50

## 2020-06-08 RX ADMIN — INSULIN DETEMIR 10 UNITS: 100 INJECTION, SOLUTION SUBCUTANEOUS at 19:50

## 2020-06-08 RX ADMIN — Medication 10 MG: at 19:49

## 2020-06-08 RX ADMIN — NYSTATIN: 100000 POWDER TOPICAL at 19:49

## 2020-06-08 RX ADMIN — FLUDROCORTISONE ACETATE 50 MCG: 0.1 TABLET ORAL at 08:51

## 2020-06-08 RX ADMIN — INSULIN ASPART 4 UNITS: 100 INJECTION, SOLUTION INTRAVENOUS; SUBCUTANEOUS at 11:25

## 2020-06-08 RX ADMIN — BACITRACIN: 500 OINTMENT TOPICAL at 19:49

## 2020-06-08 RX ADMIN — BUMETANIDE 2 MG: 1 TABLET ORAL at 08:50

## 2020-06-08 RX ADMIN — LACTULOSE 30 G: 10 SOLUTION ORAL at 09:38

## 2020-06-08 RX ADMIN — PROMETHAZINE HYDROCHLORIDE 12.5 MG: 25 INJECTION INTRAMUSCULAR; INTRAVENOUS at 03:40

## 2020-06-08 RX ADMIN — DIPHENHYDRAMINE HYDROCHLORIDE 25 MG: 25 CAPSULE ORAL at 09:38

## 2020-06-08 RX ADMIN — PROMETHAZINE HYDROCHLORIDE 12.5 MG: 25 INJECTION INTRAMUSCULAR; INTRAVENOUS at 23:00

## 2020-06-08 RX ADMIN — PROMETHAZINE HYDROCHLORIDE 12.5 MG: 25 INJECTION INTRAMUSCULAR; INTRAVENOUS at 09:37

## 2020-06-08 RX ADMIN — HYDRALAZINE HYDROCHLORIDE 10 MG: 20 INJECTION INTRAMUSCULAR; INTRAVENOUS at 15:14

## 2020-06-08 NOTE — PROGRESS NOTES
Nephrology Progress Note      Subjective   No chest pain, no SOB, No N/V    Objective       Vital signs :     Temp:  [97.2 °F (36.2 °C)-98 °F (36.7 °C)] 98 °F (36.7 °C)  Heart Rate:  [79-90] 88  Resp:  [18] 18  BP: (123-145)/(79-92) 145/92      Intake/Output Summary (Last 24 hours) at 6/8/2020 0734  Last data filed at 6/8/2020 0459  Gross per 24 hour   Intake 2115 ml   Output 1700 ml   Net 415 ml       Physical Exam:    General Appearance : not in acute distress  Lungs : clear to auscultation, respirations regular  Heart :  regular rhythm & normal rate, normal S1, S2 and no murmur, no rub  Abdomen : normal bowel sounds, no masses, no hepatomegaly, no splenomegaly, soft non-tender and no guarding  Extremities : moves extremities well, no edema, no cyanosis and no redness  Skin :  no bleeding, bruising or rash  Neurologic :   orientated to person, place, time and situation, Grossly no focal deficits  Acess :       Laboratory Data :     Albumin No results found for: ALBUMIN   Magnesium No results found for: MG       PTH               No results found for: PTH    CBC and coagulation:  Results from last 7 days   Lab Units 06/08/20  0519 06/07/20  0750 06/05/20  0324   WBC 10*3/mm3 7.17 7.65 6.91   HEMOGLOBIN g/dL 7.1* 7.1* 6.9*   HEMATOCRIT % 25.0* 24.7* 24.0*   MCV fL 86.5 86.1 87.3   MCHC g/dL 28.4* 28.7* 28.8*   PLATELETS 10*3/mm3 301 293 285     Acid/base balance:      Renal and electrolytes:  Results from last 7 days   Lab Units 06/08/20  0519 06/07/20  0750 06/06/20  1705  06/06/20  0812 06/05/20  0324 06/04/20  0423  06/03/20  0406  06/02/20  0511   SODIUM mmol/L 129* 130*  --   --  128* 132* 133*  --  133*  --   --    POTASSIUM mmol/L 5.0 5.3* 5.4*   < > 6.0* 5.6* 5.3*   < > 6.4*   < >  --    MAGNESIUM mg/dL  --   --   --   --   --   --   --   --  2.4  --  1.7   CHLORIDE mmol/L 96* 100  --   --  101 102 104  --  105  --   --    CO2 mmol/L 27.1 23.7  --   --  19.4* 22.3 23.1  --  21.8*  --   --    BUN mg/dL 34*  34*  --   --  37* 34* 36*  --  42*  --   --    CREATININE mg/dL 1.22* 1.27*  --   --  1.28* 1.28* 1.36*  --  1.39*  --   --    EGFR IF NONAFRICN AM mL/min/1.73 49* 46*  --   --  46* 46* 43*  --  42*  --   --    CALCIUM mg/dL 8.4* 8.5*  --   --  8.4* 8.1* 8.4*  --  8.2*  --   --     < > = values in this interval not displayed.     Estimated Creatinine Clearance: 64.1 mL/min (A) (by C-G formula based on SCr of 1.22 mg/dL (H)).    Liver and pancreatic function:  Results from last 7 days   Lab Units 06/04/20  0423   ALBUMIN g/dL 2.45*   BILIRUBIN mg/dL <0.2*   ALK PHOS U/L 157*   AST (SGOT) U/L 35*   ALT (SGPT) U/L 24         Cardiac:      Liver and pancreatic function:  Results from last 7 days   Lab Units 06/04/20  0423   ALBUMIN g/dL 2.45*   BILIRUBIN mg/dL <0.2*   ALK PHOS U/L 157*   AST (SGOT) U/L 35*   ALT (SGPT) U/L 24       Medications :       bacitracin  Topical Q12H   buprenorphine-naloxone 1.25 tablet Sublingual Daily   carvedilol 6.25 mg Oral BID With Meals   cholecalciferol 50,000 Units Oral Q7 Days   cloNIDine 0.2 mg Oral Q12H   docusate sodium 100 mg Oral BID   enoxaparin 40 mg Subcutaneous Q24H   fludrocortisone 50 mcg Oral Daily   folic acid 1 mg Oral Daily   insulin aspart 0-7 Units Subcutaneous TID AC   lactobacillus acidophilus 1 capsule Oral Daily   lactulose 30 g Oral TID   lactulose 40 g Oral Once   melatonin 10 mg Oral Nightly   metOLazone 5 mg Oral Once   nystatin  Topical Q12H   polyethylene glycol 17 g Oral Daily   rOPINIRole 0.25 mg Oral Nightly   senna-docusate sodium 1 tablet Oral BID   sodium chloride 10 mL Intravenous Q12H   sodium chloride 10 mL Intravenous Q12H   sodium chloride 10 mL Intravenous Q12H   Vancomycin Pharmacy Intermittent Dosing  Does not apply Daily            Assessment/Plan     1. Persistent Hyperkalemia  2. Sever constipation  3. SARINA on CKD  4. DM-II  5. Essential hypertension    HyperKalemia and SARINA is stable. Appropriate diuresis response with oral bumex, still has  some volume overload clinically, will continue bumex today as well.      Hyperkalemia likely 2/2 sever constipation, less likely from RTA.   UAG 33, suggestive of RTA, likely type IV with hyporeninemic hypoaldosteronism  Baseline Cr <1, admitted with 1.3, 2.9 G proteinuria, likely diabetic nephrosclerosis  -bumex  -f/u serology  -cotinue on docusate, senna  -once dose Kayexylate  -continue fludrocortisone 50mcg daily      Jonnathan Kinney MD  06/08/20  07:34

## 2020-06-08 NOTE — THERAPY TREATMENT NOTE
Acute Care - Physical Therapy Treatment Note   [place]     Patient Name: Angelique Mcdermott  : 1980  MRN: [id number]  Today's Date: 2020  Onset of Illness/Injury or Date of Surgery: 20  Date of Referral to PT: 20  Referring Physician: decreased functional mobility    Admit Date: 2020    Visit Dx:    ICD-10-CM ICD-9-CM   1. Type 2 diabetes mellitus with hyperglycemia, with long-term current use of insulin (CMS/[facility]) E11.65 250.00    Z79.4 790.29     V58.67   2. Hyponatremia E87.1 276.1   3. Chronic hepatitis C without hepatic coma (CMS/[facility]) B18.2 070.54   4. Methamphetamine abuse (CMS/[facility]) F15.10 305.70   5. Acute renal failure, unspecified acute renal failure type (CMS/HCC) N17.9 584.9   6. Asymptomatic microscopic hematuria R31.21 599.72   7. Hypertension, unspecified type I10 401.9   8. Olecranon bursa abscess, left M71.022 727.89     Patient Active Problem List   Diagnosis   • Type 2 diabetes mellitus with hyperglycemia, with long-term current use of insulin (CMS/HCC)   • Olecranon bursa abscess, left       Therapy Treatment    Rehabilitation Treatment Summary     Row Name 20 1549             Treatment Time/Intention    Discipline  physical therapy assistant  -RF      Document Type  therapy note (daily note)  -RF      Subjective Information  complains of;weakness;fatigue;pain  -RF      Mode of Treatment  physical therapy  -RF      Patient/Family Observations  Patient supine in bed, agreeable to treatment session.   -RF      Therapy Frequency (PT Clinical Impression)  3 times/wk 3-5 times/wk   -RF      Patient Effort  adequate  -RF      Existing Precautions/Restrictions  fall  -RF      Equipment Issued to Patient  gait belt  -RF      Patient Response to Treatment  Patient demonstrated good tolerance this date as improved functional mobility waas noted. Patient was able to stand at bedside, however LE weakness is noted. Continued skilled care required for further improvements.   -RF       Recorded by [RF] Jossy Soriano, PTA 06/08/20 1557      Row Name 06/08/20 1549             Cognitive Assessment/Intervention- PT/OT    Orientation Status (Cognition)  oriented x 3  -RF      Follows Commands (Cognition)  WFL  -RF      Recorded by [RF] Jossy Soriano, PTA 06/08/20 1557      Row Name 06/08/20 1549             Safety Issues, Functional Mobility    Impairments Affecting Function (Mobility)  endurance/activity tolerance;strength  -RF      Recorded by [RF] Jossy Soriano, PTA 06/08/20 1557      Row Name 06/08/20 1549             Bed Mobility Assessment/Treatment    Bed Mobility Assessment/Treatment  bed mobility (all) activities  -RF      [place] Level (Bed Mobility)  contact guard assist  -RF      Bed Mobility, Safety Issues  decreased use of arms for pushing/pulling;decreased use of legs for bridging/pushing  -RF      Assistive Device (Bed Mobility)  bed rails  -RF      Recorded by [RF] Jossy Soriano, PTA 06/08/20 1557      Row Name 06/08/20 1549             Transfer Assessment/Treatment    Transfer Assessment/Treatment  sit-stand transfer;stand-sit transfer  -RF      Comment (Transfers)  Patient stood at bedside X2 trials for approximately 30-45 secs. Fatigue and weakness noted with rest breaks required.   -RF      Recorded by [RF] Jossy Soriano, PTA 06/08/20 1557      Row Name 06/08/20 1549             Sit-Stand Transfer    Sit-Stand [place] (Transfers)  moderate assist (50% patient effort)  -RF      Assistive Device (Sit-Stand Transfers)  walker, front-wheeled  -RF      Recorded by [RF] Jossy Soriano, PTA 06/08/20 1557      Row Name 06/08/20 1549             Stand-Sit Transfer    Stand-Sit [place] (Transfers)  moderate assist (50% patient effort)  -RF      Assistive Device (Stand-Sit Transfers)  walker, front-wheeled  -RF      Recorded by [RF] Jossy Soriano, PTA 06/08/20 1557      Row Name 06/08/20 1549             Gait/Stairs Assessment/Training    Comment  (Gait/Stairs)  Pt unable to take steps this date.   -RF      Recorded by [RF] Jossy Soriano, PTA 06/08/20 1557      Row Name 06/08/20 1549             Motor Skills Assessment/Interventions    Additional Documentation  Therapeutic Exercise (Group);Therapeutic Exercise Interventions (Group)  -RF      Recorded by [RF] Jossy Soriano, PTA 06/08/20 1557      Row Name 06/08/20 1549             Therapeutic Exercise    Therapeutic Exercise  seated, lower extremities  -RF      Recorded by [RF] Jossy Soriano, PTA 06/08/20 1557      Row Name 06/08/20 1549             Lower Extremity Seated Therapeutic Exercise    Performed, Seated Lower Extremity (Therapeutic Exercise)  hip flexion/extension;hip abduction/adduction;knee flexion/extension;ankle dorsiflexion/plantarflexion;LAQ (long arc quad), knee extension  -RF      Exercise Type, Seated Lower Extremity (Therapeutic Exercise)  AROM (active range of motion);eccentric contraction;isotonic contraction, concentric  -RF      Expected Outcomes, Seated Lower Extremity (Therapeutic Exercise)  improve functional tolerance, community activity;improve functional tolerance, household activity;improve functional tolerance, self-care activity;improve performance, gait skills;strengthen normal movement patterns;strengthen, facilitate independent active range of motion  -RF      Sets/Reps Detail, Seated Lower Extremity (Therapeutic Exercise)  30  -RF      Comment, Seated Lower Extremity (Therapeutic Exercise)  Education provided on importance of compliance with HEP for further improved LE strengthening.   -RF      Recorded by [RF] Jossy Soriano, PTA 06/08/20 1557      Row Name 06/08/20 1549             Positioning and Restraints    Pre-Treatment Position  in bed  -RF      Post Treatment Position  bed  -RF      In Bed  supine;call light within reach;encouraged to call for assist;exit alarm on  -RF      Recorded by [RF] Jossy Soriano, PTA 06/08/20 1557      Row Name                 Wound 05/25/20 0845 Right anterior toe Arterial Ulcer    Wound - Properties Group Date first assessed: 05/25/20 [TW] Time first assessed: 0845 [TW] Present on Hospital Admission: Y [TW] Side: Right [TW] Orientation: anterior [TW] Location: toe [TW] Primary Wound Type: Arterial ulc [TW] Recorded by:  [TW] Stacy Cisneros RN 05/25/20 1259    Row Name                Wound 05/25/20 0845 Right anterior;distal;lower ankle Venous Ulcer    Wound - Properties Group Date first assessed: 05/25/20 [TW] Time first assessed: 0845 [TW] Present on Hospital Admission: Y [TW] Side: Right [TW] Orientation: anterior;distal;lower [TW] Location: ankle [TW], multiple areas noted open and scabbed  Primary Wound Type: Venous ulcer [TW] Recorded by:  [TW] Stacy Cisneros RN 05/25/20 1301    Row Name                Wound 05/25/20 0845 Right anterior;midline;lower leg Venous Ulcer    Wound - Properties Group Date first assessed: 05/25/20 [TW] Time first assessed: 0845 [TW] Present on Hospital Admission: Y [TW] Side: Right [TW] Orientation: anterior;midline;lower [TW] Location: leg [TW], multiple areas noted on shin that are open and/or dry  Primary Wound Type: Venous ulcer [TW] Recorded by:  [TW] Stacy Cisneros RN 05/25/20 1303    Row Name                Wound 05/25/20 0845 Right lower abdomen Other (comment)    Wound - Properties Group Date first assessed: 05/25/20 [TW] Time first assessed: 0845 [TW] Present on Hospital Admission: Y [TW] Side: Right [TW] Orientation: lower [TW] Location: abdomen [TW] Primary Wound Type: Other [TW], one open area, one dry area noted  Recorded by:  [TW] Stacy Cisneros RN 05/25/20 1308    Row Name                Wound 05/25/20 0845 Right mid axillary Other (comment)    Wound - Properties Group Date first assessed: 05/25/20 [TW] Time first assessed: 0845 [TW] Present on Hospital Admission: Y [TW] Side: Right [TW] Location: mid axillary [TW] Primary Wound Type: Other [TW], open area noted   Recorded by:  [TW] Stacy Cisneros RN 05/25/20 1310    Row Name                Wound 05/25/20 0845 Right shoulder Other (comment)    Wound - Properties Group Date first assessed: 05/25/20 [TW] Time first assessed: 0845 [TW] Present on Hospital Admission: Y [TW] Side: Right [TW] Location: shoulder [TW], outer shoulder with open area noted  Primary Wound Type: Other [TW], wound, open  Recorded by:  [TW] Stacy Cisneros RN 05/25/20 1312    Row Name                Wound 05/25/20 0845 scalp Other (comment)    Wound - Properties Group Date first assessed: 05/25/20 [TW] Time first assessed: 0845 [TW] Present on Hospital Admission: Y [TW] Location: scalp [TW] Primary Wound Type: Other [TW], wound x 2 top of head; one with partial yellow slough  Recorded by:  [TW] Stacy Cisneros RN 05/25/20 1314    Row Name 06/08/20 1549             Outcome Summary/Treatment Plan (PT)    Anticipated Equipment Needs at Discharge (PT)  -- tbd  -RF      Anticipated Discharge Disposition (PT)  home with assist  -RF      Recorded by [RF] Jossy Soriano, PTA 06/08/20 7346        User Key  (r) = Recorded By, (t) = Taken By, (c) = Cosigned By    Initials Name Effective Dates Discipline    TW Stacy Cisneros RN 06/16/16 -  Nurse    RF Jossy Soriano, PTA 03/07/18 -  PT          Wound 05/25/20 0845 Right anterior toe Arterial Ulcer (Active)   Dressing Appearance open to air 6/8/2020  8:52 AM   Base dry;non-blanchable 6/8/2020  8:52 AM   Periwound dry;warm 6/8/2020  8:52 AM   Periwound Temperature warm 6/8/2020  8:52 AM   Periwound Skin Turgor firm 6/8/2020  8:52 AM   Edges irregular 6/8/2020  8:52 AM   Drainage Amount none 6/8/2020  8:52 AM   Care, Wound cleansed with;sterile normal saline 6/8/2020  8:52 AM   Dressing Care, Wound open to air 6/8/2020  8:52 AM   Periwound Care, Wound topical treatment applied 6/8/2020  8:52 AM       Wound 05/25/20 0845 Right anterior;distal;lower ankle Venous Ulcer (Active)   Dressing Appearance  dry;intact 6/8/2020  8:52 AM   Periwound dry 6/8/2020  8:52 AM   Periwound Temperature warm 6/8/2020  8:52 AM   Periwound Skin Turgor firm 6/8/2020  8:52 AM   Edges irregular 6/8/2020  8:52 AM   Drainage Amount none 6/8/2020  8:52 AM   Care, Wound cleansed with;sterile normal saline 6/7/2020  8:30 PM   Dressing Care, Wound dressing changed 6/7/2020  8:30 PM   Periwound Care, Wound topical treatment applied 6/7/2020  8:30 PM       Wound 05/25/20 0845 Right anterior;midline;lower leg Venous Ulcer (Active)   Dressing Appearance dry;intact 6/8/2020  8:52 AM   Closure Adhesive bandage 6/8/2020  8:52 AM   Base non-blanchable;purple 6/8/2020  8:52 AM   Periwound dry 6/8/2020  8:52 AM   Periwound Temperature warm 6/8/2020  8:52 AM   Periwound Skin Turgor firm 6/8/2020  8:52 AM   Edges irregular 6/8/2020  8:52 AM   Drainage Amount scant 6/8/2020  8:52 AM   Care, Wound cleansed with;sterile normal saline 6/8/2020  8:52 AM   Dressing Care, Wound dressing changed 6/8/2020  8:52 AM   Periwound Care, Wound topical treatment applied 6/8/2020  8:52 AM       Wound 05/25/20 0845 Right lower abdomen Other (comment) (Active)   Dressing Appearance dry;intact 6/8/2020  8:52 AM   Periwound dry 6/8/2020  8:52 AM   Periwound Temperature warm 6/8/2020  8:52 AM   Periwound Skin Turgor firm 6/8/2020  8:52 AM   Edges irregular 6/8/2020  8:52 AM   Drainage Amount none 6/8/2020  8:52 AM   Care, Wound cleansed with;sterile normal saline 6/8/2020  8:52 AM   Dressing Care, Wound dressing changed 6/8/2020  8:52 AM   Periwound Care, Wound topical treatment applied 6/8/2020  8:52 AM       Wound 05/25/20 0845 Right mid axillary Other (comment) (Active)   Dressing Appearance open to air 6/8/2020  8:52 AM   Base pink;dry;clean 6/8/2020  8:52 AM   Periwound pink;intact 6/8/2020  8:52 AM   Periwound Temperature warm 6/8/2020  8:52 AM   Periwound Skin Turgor soft 6/8/2020  8:52 AM   Edges irregular 6/8/2020  8:52 AM   Drainage Amount none 6/8/2020  8:52 AM    Care, Wound cleansed with;sterile normal saline 6/8/2020  8:52 AM   Dressing Care, Wound open to air 6/8/2020  8:52 AM   Periwound Care, Wound topical treatment applied 6/8/2020  8:52 AM       Wound 05/25/20 0845 Right shoulder Other (comment) (Active)   Dressing Appearance dry;intact 6/8/2020  8:52 AM   Base dry;scab 6/8/2020  8:52 AM   Periwound dry 6/8/2020  8:52 AM   Periwound Temperature warm 6/8/2020  8:52 AM   Periwound Skin Turgor firm 6/8/2020  8:52 AM   Edges irregular 6/8/2020  8:52 AM   Drainage Amount none 6/8/2020  8:52 AM   Care, Wound cleansed with;sterile normal saline 6/7/2020  8:30 PM   Dressing Care, Wound open to air 6/7/2020  8:30 PM   Periwound Care, Wound topical treatment applied 6/7/2020  8:30 PM       Wound 05/25/20 0845 scalp Other (comment) (Active)   Dressing Appearance open to air 6/8/2020  8:52 AM   Closure Open to air 6/8/2020  8:52 AM   Base dry;scab 6/8/2020  8:52 AM   Periwound dry 6/8/2020  8:52 AM   Periwound Temperature warm 6/8/2020  8:52 AM   Drainage Characteristics/Odor yellow 6/8/2020  8:52 AM   Drainage Amount none 6/8/2020  8:52 AM   Care, Wound cleansed with;sterile normal saline 6/8/2020  8:52 AM   Dressing Care, Wound open to air 6/8/2020  8:52 AM   Periwound Care, Wound topical treatment applied 6/8/2020  8:52 AM           Physical Therapy Education                 Title: PT OT SLP Therapies (Done)     Topic: Physical Therapy (Done)     Point: Mobility training (Done)     Description:   Instruct learner(s) on safety and technique for assisting patient out of bed, chair or wheelchair.  Instruct in the proper use of assistive devices, such as walker, crutches, cane or brace.              Patient Friendly Description:   It's important to get you on your feet again, but we need to do so in a way that is safe for you. Falling has serious consequences, and your personal safety is the most important thing of all.        When it's time to get out of bed, one of us or a  family member will sit next to you on the bed to give you support.     If your doctor or nurse tells you to use a walker, crutches, a cane, or a brace, be sure you use it every time you get out of bed, even if you think you don't need it.    Learning Progress Summary           Patient Acceptance, E,TB, VU by RF at 6/8/2020 1557    Acceptance, E,TB, VU by BT at 6/7/2020 0927    Acceptance, E, VU by CC at 6/5/2020 1513    Acceptance, E,TB, VU by CT at 6/4/2020 1346    Acceptance, E, VU by KB at 6/3/2020 1326    Acceptance, E,TB, VU by CT at 6/2/2020 1413                   Point: Home exercise program (Done)     Description:   Instruct learner(s) on appropriate technique for monitoring, assisting and/or progressing patient with therapeutic exercises and activities.              Learning Progress Summary           Patient Acceptance, E,TB, VU by RF at 6/8/2020 1557    Acceptance, E,TB, VU by BT at 6/7/2020 0927    Acceptance, E, VU by CC at 6/5/2020 1513    Acceptance, E,TB, VU by CT at 6/4/2020 1346    Acceptance, E, VU by KB at 6/3/2020 1326    Acceptance, E,TB, VU by CT at 6/2/2020 1413                   Point: Body mechanics (Done)     Description:   Instruct learner(s) on proper positioning and spine alignment for patient and/or caregiver during mobility tasks and/or exercises.              Learning Progress Summary           Patient Acceptance, E,TB, VU by RF at 6/8/2020 1557    Acceptance, E,TB, VU by BT at 6/7/2020 0927    Acceptance, E, VU by CC at 6/5/2020 1513    Acceptance, E,TB, VU by CT at 6/4/2020 1346    Acceptance, E, VU by KB at 6/3/2020 1326    Acceptance, E,TB, VU by CT at 6/2/2020 1413                   Point: Precautions (Done)     Description:   Instruct learner(s) on prescribed precautions during mobility and gait tasks              Learning Progress Summary           Patient Acceptance, E,TB, VU by RF at 6/8/2020 1557    Acceptance, E,TB, VU by BT at 6/7/2020 0927    Acceptance, E, VU by CC at  6/5/2020 1513    Acceptance, E,TB, VU by CT at 6/4/2020 1346    Acceptance, E, VU by KB at 6/3/2020 1326    Acceptance, E,TB, VU by CT at 6/2/2020 1413                               User Key     Initials Effective Dates Name Provider Type Discipline    CT 04/03/18 -  Nury Ochoa, PT Physical Therapist PT    BT 05/22/17 -  Ronan Baldwin, RN Registered Nurse Nurse    RF 03/07/18 -  Jossy Soriano PTA Physical Therapy Assistant PT    KB 07/25/18 -  Ml Hung, MCKENNA Registered Nurse Nurse    CC 01/15/20 -  Cheyenne Sanchez, MCKENNA Registered Nurse Nurse                PT Recommendation and Plan  Anticipated Discharge Disposition (PT): home with assist  Therapy Frequency (PT Clinical Impression): 3 times/wk(3-5 times/wk )  Outcome Summary/Treatment Plan (PT)  Anticipated Equipment Needs at Discharge (PT): (tbd)  Anticipated Discharge Disposition (PT): home with assist     Time Calculation:   PT Charges     Row Name 06/08/20 1557             Time Calculation    PT Received On  06/08/20  -RF      PT Goal Re-Cert Due Date  06/16/20  -RF         Time Calculation- PT    Total Timed Code Minutes- PT  38 minute(s)  -RF        User Key  (r) = Recorded By, (t) = Taken By, (c) = Cosigned By    Initials Name Provider Type    RF Jossy Soriano PTA Physical Therapy Assistant        Therapy Charges for Today     Code Description Service Date Service Provider Modifiers Qty    21626803605 HC PT THER PROC EA 15 MIN 6/8/2020 Jossy Soriano PTA GP 1    84308146723 HC PT THERAPEUTIC ACT EA 15 MIN 6/8/2020 Jossy Soriano PTA GP 2               Jossy Soriano PTA  6/8/2020

## 2020-06-08 NOTE — PLAN OF CARE
Pt frequently seeks out staff and is noncompliant w/ turns, wedge use, and/or medication administration. Pt educated on importance of taking meds ordered to no avail as well as the importance of turns q2 and wedge use. Pt said she would self turn. Will continue to monitor.

## 2020-06-08 NOTE — PROGRESS NOTES
Subjective     History:   Angelique Mcdermott is a 39 y.o. female admitted on 5/25/2020 secondary to Olecranon bursa abscess, left     Procedures: None    CC: Follow up hyperkalemia     Patient seen and examined with MCKENNA Joseph. Awake and alert. Reports some nausea but states she feels overall improved. No reported vomiting. Reports multiple BM's. No reported CP, dyspnea or palpitations. No acute events overnight per RN.     History taken from: patient, chart, and RN.      Objective     Vital Signs  Temp:  [97.2 °F (36.2 °C)-98.1 °F (36.7 °C)] 98.1 °F (36.7 °C)  Heart Rate:  [82-96] 96  Resp:  [18] 18  BP: (134-157)/() 134/90    Intake/Output Summary (Last 24 hours) at 6/8/2020 1658  Last data filed at 6/8/2020 1410  Gross per 24 hour   Intake 2405 ml   Output --   Net 2405 ml         Physical Exam:  General:    Awake, alert, in no acute distress, chronically ill appearing   Heart:      Normal S1 and S2. Regular rate and rhythm. No significant murmur, rubs or gallops appreciated.   Lungs:     Respirations regular, even and unlabored. Lungs clear to auscultation B/L. No wheezes, rales or rhonchi.   Abdomen:   Soft. Nontender. No guarding, rebound tenderness or organomegaly noted. Bowel sounds present x 4.   Extremities:  No clubbing, cyanosis or edema noted in RLE. RLE wounds bandaged. (+) LLE AKA. Left elbow swelling much improved. No drainage or surrounding erythema.       Results Review:    Results from last 7 days   Lab Units 06/08/20  0519 06/07/20  0750 06/05/20  0324 06/04/20  0423 06/02/20  0116   WBC 10*3/mm3 7.17 7.65 6.91 7.32 7.33   HEMOGLOBIN g/dL 7.1* 7.1* 6.9* 6.9* 7.5*   PLATELETS 10*3/mm3 301 293 285 305 239     Results from last 7 days   Lab Units 06/08/20  0519 06/07/20  0750 06/06/20  1705 06/06/20  1447 06/06/20  0812 06/05/20  0324 06/04/20  0423  06/03/20  0406  06/02/20  0116   SODIUM mmol/L 129* 130*  --   --  128* 132* 133*  --  133*  --  129*   POTASSIUM mmol/L 5.0 5.3* 5.4* 5.8* 6.0*  5.6* 5.3*   < > 6.4*   < > 6.6*   CHLORIDE mmol/L 96* 100  --   --  101 102 104  --  105  --  103   CO2 mmol/L 27.1 23.7  --   --  19.4* 22.3 23.1  --  21.8*  --  20.0*   BUN mg/dL 34* 34*  --   --  37* 34* 36*  --  42*  --  45*   CREATININE mg/dL 1.22* 1.27*  --   --  1.28* 1.28* 1.36*  --  1.39*  --  1.25*   CALCIUM mg/dL 8.4* 8.5*  --   --  8.4* 8.1* 8.4*  --  8.2*  --  7.9*   GLUCOSE mg/dL 326* 256*  --   --  227* 337* 47*  --  221*  --  130*    < > = values in this interval not displayed.     Results from last 7 days   Lab Units 06/04/20  0423   BILIRUBIN mg/dL <0.2*   ALK PHOS U/L 157*   AST (SGOT) U/L 35*   ALT (SGPT) U/L 24     Results from last 7 days   Lab Units 06/03/20  0406 06/02/20  0511   MAGNESIUM mg/dL 2.4 1.7               Imaging Results (Last 24 Hours)     ** No results found for the last 24 hours. **            Medications:    bacitracin  Topical Q12H   buprenorphine-naloxone 1.25 tablet Sublingual Daily   carvedilol 6.25 mg Oral BID With Meals   cholecalciferol 50,000 Units Oral Q7 Days   cloNIDine 0.2 mg Oral Q12H   docusate sodium 100 mg Oral BID   enoxaparin 40 mg Subcutaneous Q24H   fludrocortisone 50 mcg Oral Daily   folic acid 1 mg Oral Daily   insulin aspart 0-7 Units Subcutaneous TID AC   insulin detemir 10 Units Subcutaneous Nightly   lactobacillus acidophilus 1 capsule Oral Daily   lactulose 30 g Oral TID   lactulose 40 g Oral Once   melatonin 10 mg Oral Nightly   metOLazone 5 mg Oral Once   nystatin  Topical Q12H   polyethylene glycol 17 g Oral Daily   rOPINIRole 0.25 mg Oral Nightly   senna-docusate sodium 1 tablet Oral BID   sodium chloride 10 mL Intravenous Q12H   sodium chloride 10 mL Intravenous Q12H   sodium chloride 10 mL Intravenous Q12H   Vancomycin Pharmacy Intermittent Dosing  Does not apply Daily              Assessment/Plan   UTI: Urine culture revealing mixed jefferson. She has been on Rocephin empirically.       Nausea, vomiting and abdominal pain: CT abd/pelvis revealed  no acute abnormalities with extensive soft tissue edema throughout the body wall and mesentary with no ascites, hepatosplenomegaly, cholecystectomy, hysterectomy, likely prior appendectomy, RLQ hernia repair and nonobstructing right renal calculi. Gastric emptying study is normal. Symptoms overall improved. Cont treatment of constipation.     DM II, insulin dependent with hyperglycemia: HgbA1c is 11.1. Pt admits to noncompliance with diabetic diet at home. Diabetes educator consulted. Basal insulin stopped as her BG decreased but it has now trended back upward. Restart basal insulin at reduced dose. Cont SSI with Accuchecks.      Essential HTN: BP elevated upon admission but much improved and now stable and controlled. Cont home Coreg and clonidine.  Holding home lisinopril in the setting of hyperkalemia. Cont PRN IV hydralazine.      SARINA vs CKD III with proteinuria: Possibly 2/2 above. No hydronephrosis on CT. Holding home lisinopril. Creatine overall stable today. Repeat labs in the AM.     Hyperkalemia: Initially suspected to be 2/2 severe constipation. 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 Improved with fludrocortisone. Cont bowel regimen. Nephrology input appreciated.       Anion gap metabolic acidosis: Possibly 2/2 above. Now resolved.       Mild troponin elevation with hx of CAD: No acute ischemic changes on EKG. Normalized on repeat. Echo reveals an EF of 56-60%, grade I diastolic dysfunction and mild TR. Pt denies CP. Monitor on telemetry.       Hyponatremia: See above treatment. Nephrology has ordered diuretics today.       Normocytic anemia: Iron deficient and nephrology ordered a dose of Venofer. Folate and Vit D are low and supplementation has been started. Repeat CBC in the AM.      Substance abuse: UDS positive for amphetamines and buprenorphine. Pt is prescribed Suboxone and admits to recent methamphetamine abuse. Denies recent IVDA. Declines any information regarding  substance abuse programs.      Cirrhosis with hx of Hep B and C: Viral hepatitis panel reveals Hep C Ab reactivity. Will need outpatient follow up with GI.     Left elbow effusion with concern for bursitis: No acute findings on xray. Records from [facility] reveal MRSA on cultures. Surgical intervention planned but pt refused, stating she did not want to be NPO for an extended period of time. She later became agreeable to surgery but she clinically improved with IV antibiotics and ortho did not think she needed surgery. Cont Vanc. Ortho has signed off with input appreciated.     Severe constipation: Improved. Cont aggressive bowel regimen.     Hypomagnesemia: Improved with supplementation.      DVT PPX: SQ Lovenox          Jeremiah Medina DO  06/08/20  16:58

## 2020-06-08 NOTE — PLAN OF CARE
Patient resting comfortably this shift. Complains of intermittent nausea. PRN medication utilized. Will continue to monitor and follow plan of care

## 2020-06-09 LAB
ALBUMIN SERPL-MCNC: 2.3 G/DL (ref 2.9–4.4)
ALBUMIN SERPL-MCNC: 2.49 G/DL (ref 3.5–5.2)
ALBUMIN/GLOB SERPL: 0.5 G/DL
ALBUMIN/GLOB SERPL: 0.5 {RATIO} (ref 0.7–1.7)
ALDOST SERPL-MCNC: 2.3 NG/DL (ref 0–30)
ALP SERPL-CCNC: 163 U/L (ref 39–117)
ALPHA1 GLOB FLD ELPH-MCNC: 0.2 G/DL (ref 0–0.4)
ALPHA2 GLOB SERPL ELPH-MCNC: 0.9 G/DL (ref 0.4–1)
ALT SERPL W P-5'-P-CCNC: 17 U/L (ref 1–33)
ANION GAP SERPL CALCULATED.3IONS-SCNC: 8.3 MMOL/L (ref 5–15)
ANISOCYTOSIS BLD QL: NORMAL
AST SERPL-CCNC: 21 U/L (ref 1–32)
B-GLOBULIN SERPL ELPH-MCNC: 1 G/DL (ref 0.7–1.3)
BASOPHILS # BLD AUTO: 0.09 10*3/MM3 (ref 0–0.2)
BASOPHILS NFR BLD AUTO: 1.4 % (ref 0–1.5)
BILIRUB SERPL-MCNC: <0.2 MG/DL (ref 0.2–1.2)
BUN BLD-MCNC: 34 MG/DL (ref 6–20)
BUN/CREAT SERPL: 27.4 (ref 7–25)
CALCIUM SPEC-SCNC: 8.5 MG/DL (ref 8.6–10.5)
CHLORIDE SERPL-SCNC: 97 MMOL/L (ref 98–107)
CO2 SERPL-SCNC: 23.7 MMOL/L (ref 22–29)
CREAT BLD-MCNC: 1.24 MG/DL (ref 0.57–1)
DEPRECATED RDW RBC AUTO: 59.4 FL (ref 37–54)
EOSINOPHIL # BLD AUTO: 0.45 10*3/MM3 (ref 0–0.4)
EOSINOPHIL NFR BLD AUTO: 6.8 % (ref 0.3–6.2)
ERYTHROCYTE [DISTWIDTH] IN BLOOD BY AUTOMATED COUNT: 18.2 % (ref 12.3–15.4)
GAMMA GLOB SERPL ELPH-MCNC: 2.7 G/DL (ref 0.4–1.8)
GFR SERPL CREATININE-BSD FRML MDRD: 48 ML/MIN/1.73
GLOBULIN SER CALC-MCNC: 4.7 G/DL (ref 2.2–3.9)
GLOBULIN UR ELPH-MCNC: 5.4 GM/DL
GLUCOSE BLD-MCNC: 238 MG/DL (ref 65–99)
GLUCOSE BLDC GLUCOMTR-MCNC: 175 MG/DL (ref 70–130)
GLUCOSE BLDC GLUCOMTR-MCNC: 187 MG/DL (ref 70–130)
GLUCOSE BLDC GLUCOMTR-MCNC: 282 MG/DL (ref 70–130)
GLUCOSE BLDC GLUCOMTR-MCNC: 314 MG/DL (ref 70–130)
HBV CORE AB SER DONR QL IA: POSITIVE
HBV CORE IGM SERPL QL IA: NEGATIVE
HBV E AB SERPL QL IA: POSITIVE
HBV E AG SERPL QL IA: NEGATIVE
HBV SURFACE AB SER QL: REACTIVE
HBV SURFACE AG SERPL QL IA: NEGATIVE
HCT VFR BLD AUTO: 26.7 % (ref 34–46.6)
HCV AB S/CO SERPL IA: >11 S/CO RATIO (ref 0–0.9)
HGB BLD-MCNC: 7.3 G/DL (ref 12–15.9)
HYPOCHROMIA BLD QL: NORMAL
IGA SERPL-MCNC: 313 MG/DL (ref 87–352)
IGG SERPL-MCNC: 2883 MG/DL (ref 586–1602)
IGM SERPL-MCNC: 298 MG/DL (ref 26–217)
IMM GRANULOCYTES # BLD AUTO: 0.03 10*3/MM3 (ref 0–0.05)
IMM GRANULOCYTES NFR BLD AUTO: 0.5 % (ref 0–0.5)
INTERPRETATION SERPL IEP-IMP: ABNORMAL
LABORATORY COMMENT REPORT: NORMAL
LYMPHOCYTES # BLD AUTO: 2.15 10*3/MM3 (ref 0.7–3.1)
LYMPHOCYTES NFR BLD AUTO: 32.6 % (ref 19.6–45.3)
Lab: ABNORMAL
M-SPIKE: ABNORMAL G/DL
MCH RBC QN AUTO: 24.3 PG (ref 26.6–33)
MCHC RBC AUTO-ENTMCNC: 27.3 G/DL (ref 31.5–35.7)
MCV RBC AUTO: 89 FL (ref 79–97)
MONOCYTES # BLD AUTO: 0.54 10*3/MM3 (ref 0.1–0.9)
MONOCYTES NFR BLD AUTO: 8.2 % (ref 5–12)
NEUTROPHILS # BLD AUTO: 3.33 10*3/MM3 (ref 1.7–7)
NEUTROPHILS NFR BLD AUTO: 50.5 % (ref 42.7–76)
NRBC BLD AUTO-RTO: 0 /100 WBC (ref 0–0.2)
PLAT MORPH BLD: NORMAL
PLATELET # BLD AUTO: 304 10*3/MM3 (ref 140–450)
PMV BLD AUTO: 9.8 FL (ref 6–12)
POTASSIUM BLD-SCNC: 5.1 MMOL/L (ref 3.5–5.2)
PROT SERPL-MCNC: 7 G/DL (ref 6–8.5)
PROT SERPL-MCNC: 7.9 G/DL (ref 6–8.5)
RBC # BLD AUTO: 3 10*6/MM3 (ref 3.77–5.28)
SODIUM BLD-SCNC: 129 MMOL/L (ref 136–145)
VANCOMYCIN SERPL-MCNC: 16.9 MCG/ML (ref 5–40)
WBC NRBC COR # BLD: 6.59 10*3/MM3 (ref 3.4–10.8)

## 2020-06-09 PROCEDURE — 99232 SBSQ HOSP IP/OBS MODERATE 35: CPT | Performed by: INTERNAL MEDICINE

## 2020-06-09 PROCEDURE — 25010000002 VANCOMYCIN 1 G RECONSTITUTED SOLUTION: Performed by: INTERNAL MEDICINE

## 2020-06-09 PROCEDURE — 25010000002 ONDANSETRON PER 1 MG: Performed by: INTERNAL MEDICINE

## 2020-06-09 PROCEDURE — 97530 THERAPEUTIC ACTIVITIES: CPT

## 2020-06-09 PROCEDURE — 25010000002 PROMETHAZINE PER 50 MG: Performed by: INTERNAL MEDICINE

## 2020-06-09 PROCEDURE — 63710000001 INSULIN DETEMIR PER 5 UNITS: Performed by: INTERNAL MEDICINE

## 2020-06-09 PROCEDURE — 63710000001 DIPHENHYDRAMINE PER 50 MG: Performed by: INTERNAL MEDICINE

## 2020-06-09 PROCEDURE — 80202 ASSAY OF VANCOMYCIN: CPT

## 2020-06-09 PROCEDURE — 80053 COMPREHEN METABOLIC PANEL: CPT | Performed by: INTERNAL MEDICINE

## 2020-06-09 PROCEDURE — 85007 BL SMEAR W/DIFF WBC COUNT: CPT | Performed by: INTERNAL MEDICINE

## 2020-06-09 PROCEDURE — 63710000001 INSULIN ASPART PER 5 UNITS: Performed by: INTERNAL MEDICINE

## 2020-06-09 PROCEDURE — 25010000002 ENOXAPARIN PER 10 MG: Performed by: INTERNAL MEDICINE

## 2020-06-09 PROCEDURE — 82962 GLUCOSE BLOOD TEST: CPT

## 2020-06-09 PROCEDURE — 85025 COMPLETE CBC W/AUTO DIFF WBC: CPT | Performed by: INTERNAL MEDICINE

## 2020-06-09 PROCEDURE — 97110 THERAPEUTIC EXERCISES: CPT

## 2020-06-09 RX ADMIN — NYSTATIN: 100000 POWDER TOPICAL at 08:35

## 2020-06-09 RX ADMIN — FOLIC ACID 1 MG: 1 TABLET ORAL at 08:36

## 2020-06-09 RX ADMIN — LACTULOSE 30 G: 10 SOLUTION ORAL at 15:01

## 2020-06-09 RX ADMIN — TIZANIDINE 4 MG: 4 TABLET ORAL at 12:49

## 2020-06-09 RX ADMIN — BACITRACIN: 500 OINTMENT TOPICAL at 20:15

## 2020-06-09 RX ADMIN — INSULIN ASPART 2 UNITS: 100 INJECTION, SOLUTION INTRAVENOUS; SUBCUTANEOUS at 08:35

## 2020-06-09 RX ADMIN — NYSTATIN: 100000 POWDER TOPICAL at 20:15

## 2020-06-09 RX ADMIN — CARVEDILOL 6.25 MG: 6.25 TABLET, FILM COATED ORAL at 16:32

## 2020-06-09 RX ADMIN — ENOXAPARIN SODIUM 40 MG: 40 INJECTION SUBCUTANEOUS at 08:37

## 2020-06-09 RX ADMIN — CLONIDINE HYDROCHLORIDE 0.2 MG: 0.2 TABLET ORAL at 20:15

## 2020-06-09 RX ADMIN — BUPRENORPHINE HYDROCHLORIDE AND NALOXONE HYDROCHLORIDE 1.25 TABLET: 8; 2 TABLET SUBLINGUAL at 08:36

## 2020-06-09 RX ADMIN — CARVEDILOL 6.25 MG: 6.25 TABLET, FILM COATED ORAL at 08:37

## 2020-06-09 RX ADMIN — Medication 10 MG: at 20:15

## 2020-06-09 RX ADMIN — VANCOMYCIN HYDROCHLORIDE 1000 MG: 1 INJECTION, POWDER, LYOPHILIZED, FOR SOLUTION INTRAVENOUS at 09:47

## 2020-06-09 RX ADMIN — INSULIN ASPART 5 UNITS: 100 INJECTION, SOLUTION INTRAVENOUS; SUBCUTANEOUS at 16:31

## 2020-06-09 RX ADMIN — FLUDROCORTISONE ACETATE 50 MCG: 0.1 TABLET ORAL at 08:36

## 2020-06-09 RX ADMIN — ROPINIROLE HYDROCHLORIDE 0.25 MG: 0.25 TABLET, FILM COATED ORAL at 20:15

## 2020-06-09 RX ADMIN — DIPHENHYDRAMINE HYDROCHLORIDE 25 MG: 25 CAPSULE ORAL at 20:15

## 2020-06-09 RX ADMIN — TIZANIDINE 4 MG: 4 TABLET ORAL at 20:15

## 2020-06-09 RX ADMIN — BACITRACIN: 500 OINTMENT TOPICAL at 08:35

## 2020-06-09 RX ADMIN — CLONIDINE HYDROCHLORIDE 0.2 MG: 0.2 TABLET ORAL at 08:37

## 2020-06-09 RX ADMIN — PROMETHAZINE HYDROCHLORIDE 12.5 MG: 25 INJECTION INTRAMUSCULAR; INTRAVENOUS at 05:39

## 2020-06-09 RX ADMIN — DIPHENHYDRAMINE HYDROCHLORIDE 25 MG: 25 CAPSULE ORAL at 11:16

## 2020-06-09 RX ADMIN — INSULIN DETEMIR 15 UNITS: 100 INJECTION, SOLUTION SUBCUTANEOUS at 20:15

## 2020-06-09 RX ADMIN — PROMETHAZINE HYDROCHLORIDE 12.5 MG: 25 INJECTION INTRAMUSCULAR; INTRAVENOUS at 11:16

## 2020-06-09 RX ADMIN — ONDANSETRON 4 MG: 2 INJECTION INTRAMUSCULAR; INTRAVENOUS at 08:45

## 2020-06-09 RX ADMIN — PROMETHAZINE HYDROCHLORIDE 12.5 MG: 25 INJECTION INTRAMUSCULAR; INTRAVENOUS at 17:54

## 2020-06-09 RX ADMIN — INSULIN ASPART 4 UNITS: 100 INJECTION, SOLUTION INTRAVENOUS; SUBCUTANEOUS at 11:17

## 2020-06-09 RX ADMIN — LACTULOSE 30 G: 10 SOLUTION ORAL at 08:37

## 2020-06-09 NOTE — PHARMACY PATIENT ASSISTANCE
Dr. Soler asked pharmacy to look into coverage and pricing for Veltassa.  It required a prior authorization.  According to her insurance, [employer], they denied the prior authorization due to Kionex suspension is the preferred drug and must be tried first.    Thank You;  Kellen Landa, [facility]  06/09/20  15:33

## 2020-06-09 NOTE — THERAPY TREATMENT NOTE
Acute Care - Physical Therapy Treatment Note  [facility]     Patient Name: Angelique Mcdermott  : 1980  MRN: [id number]  Today's Date: 2020  Onset of Illness/Injury or Date of Surgery: 20  Date of Referral to PT: 20  Referring Physician: decreased functional mobility    Admit Date: 2020    Visit Dx:    ICD-10-CM ICD-9-CM   1. Type 2 diabetes mellitus with hyperglycemia, with long-term current use of insulin (CMS/HCC) E11.65 250.00    Z79.4 790.29     V58.67   2. Hyponatremia E87.1 276.1   3. Chronic hepatitis C without hepatic coma (CMS/[facility]) B18.2 070.54   4. Methamphetamine abuse (CMS/[facility]) F15.10 305.70   5. Acute renal failure, unspecified acute renal failure type (CMS/HCC) N17.9 584.9   6. Asymptomatic microscopic hematuria R31.21 599.72   7. Hypertension, unspecified type I10 401.9   8. Olecranon bursa abscess, left M71.022 727.89     Patient Active Problem List   Diagnosis   • Type 2 diabetes mellitus with hyperglycemia, with long-term current use of insulin (CMS/HCC)   • Olecranon bursa abscess, left       Therapy Treatment    Rehabilitation Treatment Summary     Row Name 20 1558             Treatment Time/Intention    Discipline  physical therapy assistant  -RF      Document Type  therapy note (daily note)  -RF      Subjective Information  complains of;pain with activity  -RF      Mode of Treatment  physical therapy  -RF      Patient/Family Observations  Patient supine in bed, agreeable to treatment session. No apparent distress noted.   -RF      Therapy Frequency (PT Clinical Impression)  3 times/wk 3-5 times/wk   -RF      Patient Effort  adequate  -RF      Existing Precautions/Restrictions  fall  -RF      Patient Response to Treatment  Patient continues to demonstrate LE weakess, however improvements in functional mobility noted.  Patient refuses tranfer to bedside chair due to toileting issues. Continued skilled care required for further improvements.   -RF2      Recorded by  [RF] Jossy Soriano, PTA 06/09/20 1608  [RF2] Jossy Soriano, PTA 06/09/20 1609      Row Name 06/09/20 1558             Cognitive Assessment/Intervention- PT/OT    Orientation Status (Cognition)  oriented x 3  -RF      Follows Commands (Cognition)  WFL  -RF      Recorded by [RF] Jossy Soriano, PTA 06/09/20 1608      Row Name 06/09/20 1558             Safety Issues, Functional Mobility    Impairments Affecting Function (Mobility)  endurance/activity tolerance;strength  -RF      Recorded by [RF] Jossy Soriano, PTA 06/09/20 1608      Row Name 06/09/20 1558             Bed Mobility Assessment/Treatment    Bed Mobility Assessment/Treatment  bed mobility (all) activities  -RF      [place] Level (Bed Mobility)  contact guard assist  -RF      Bed Mobility, Safety Issues  decreased use of arms for pushing/pulling;decreased use of legs for bridging/pushing  -RF      Assistive Device (Bed Mobility)  bed rails  -RF      Recorded by [RF] Jossy Soriano, PTA 06/09/20 1608      Row Name 06/09/20 1558             Transfer Assessment/Treatment    Transfer Assessment/Treatment  sit-stand transfer;stand-sit transfer  -RF      Comment (Transfers)  Sit>stand from elevated surface X 3 for approx 45 secs.   -RF      Recorded by [RF] Jossy Soriano, PTA 06/09/20 1608      Row Name 06/09/20 1558             Sit-Stand Transfer    Sit-Stand [place] (Transfers)  minimum assist (75% patient effort);moderate assist (50% patient effort)  -RF      Assistive Device (Sit-Stand Transfers)  walker, front-wheeled  -RF      Recorded by [RF] Jossy Soriano, PTA 06/09/20 1608      Row Name 06/09/20 1558             Stand-Sit Transfer    Stand-Sit [place] (Transfers)  minimum assist (75% patient effort);moderate assist (50% patient effort)  -RF      Assistive Device (Stand-Sit Transfers)  walker, front-wheeled  -RF      Recorded by [RF] Jossy Soriano, PTA 06/09/20 1608      Row Name 06/09/20 1558              Gait/Stairs Assessment/Training    Comment (Gait/Stairs)  Patient attemoted taking one step with RW, but was unable. Fear avoidance noted.   -RF      Recorded by [RF] Jossy Soriano, [facility] 06/09/20 1608      Row Name 06/09/20 1558             Motor Skills Assessment/Interventions    Additional Documentation  Balance (Group);Balance Interventions (Group)  -RF      Recorded by [RF] Jossy Soriano, [facility] 06/09/20 1608      Row Name 06/09/20 1558             Therapeutic Exercise    Therapeutic Exercise  standing, lower extremities  -RF      Recorded by [RF] Jossy Soriano, [facility] 06/09/20 1608      Row Name 06/09/20 1558             Lower Extremity Standing Therapeutic Exercise    Performed, Standing Lower Extremity (Therapeutic Exercise)  other (see comments) HR on RLE, Hip flex, ABD, and Ext on L  -RF      Device, Standing Lower Extremity (Therapeutic Exercise)  other (see comments) RW  -RF      Exercise Type, Standing Lower Extremity (Therapeutic Exercise)  AROM (active range of motion);eccentric contraction;isotonic contraction, concentric  -RF      Expected Outcomes, Standing Lower Extremity (Therapeutic Exercise)  improve functional tolerance, community activity;improve functional tolerance, household activity;improve functional tolerance, self-care activity;improve performance, gait skills;strengthen, facilitate independent active range of motion;strengthen normal movement patterns  -RF      Sets/Reps Detail, Standing Lower Extremity (Therapeutic Exercise)  10  -RF      Recorded by [RF] Jossy Soriano, [facility] 06/09/20 1608      Row Name 06/09/20 1558             Balance    Balance  standing balance activity  -RF      Recorded by [RF] Jossy Soriano, [facility] 06/09/20 1608      Row Name 06/09/20 1558             Standing Balance Activity    Activities Performed (Standing, Balance Training)  other (see comments) weightshifting with RW  -RF      Support Needed for Balance (Standing, Balance Training)  minimal  external support for balance, 75% patient effort  -RF      Comment (Standing, Balance Training)  Patient able to stand fro approx 45 secs while weightshifting;  -RF      Recorded by [RF] Jossy Soriano PTA 06/09/20 1608      Row Name 06/09/20 1558             Positioning and Restraints    Pre-Treatment Position  in bed  -RF      Post Treatment Position  bed  -RF      In Bed  supine;call light within reach;encouraged to call for assist;exit alarm on  -RF      Recorded by [RF] Jossy Soriano, PTA 06/09/20 1608      Row Name                Wound 05/25/20 0845 Right anterior toe Arterial Ulcer    Wound - Properties Group Date first assessed: 05/25/20 [TW] Time first assessed: 0845 [TW] Present on Hospital Admission: Y [TW] Side: Right [TW] Orientation: anterior [TW] Location: toe [TW] Primary Wound Type: Arterial ulc [TW] Recorded by:  [TW] Stacy Cisneros RN 05/25/20 1259    Row Name                Wound 05/25/20 0845 Right anterior;distal;lower ankle Venous Ulcer    Wound - Properties Group Date first assessed: 05/25/20 [TW] Time first assessed: 0845 [TW] Present on Hospital Admission: Y [TW] Side: Right [TW] Orientation: anterior;distal;lower [TW] Location: ankle [TW], multiple areas noted open and scabbed  Primary Wound Type: Venous ulcer [TW] Recorded by:  [TW] Stacy Cisneros RN 05/25/20 1301    Row Name                Wound 05/25/20 0845 Right anterior;midline;lower leg Venous Ulcer    Wound - Properties Group Date first assessed: 05/25/20 [TW] Time first assessed: 0845 [TW] Present on Hospital Admission: Y [TW] Side: Right [TW] Orientation: anterior;midline;lower [TW] Location: leg [TW], multiple areas noted on shin that are open and/or dry  Primary Wound Type: Venous ulcer [TW] Recorded by:  [TW] Stacy Cisneros RN 05/25/20 1303    Row Name                Wound 05/25/20 0845 Right lower abdomen Other (comment)    Wound - Properties Group Date first assessed: 05/25/20 [TW] Time first assessed:  0845 [TW] Present on Hospital Admission: Y [TW] Side: Right [TW] Orientation: lower [TW] Location: abdomen [TW] Primary Wound Type: Other [TW], one open area, one dry area noted  Recorded by:  [TW] Stacy Cisneros RN 05/25/20 1308    Row Name                Wound 05/25/20 0845 Right mid axillary Other (comment)    Wound - Properties Group Date first assessed: 05/25/20 [TW] Time first assessed: 0845 [TW] Present on Hospital Admission: Y [TW] Side: Right [TW] Location: mid axillary [TW] Primary Wound Type: Other [TW], open area noted  Recorded by:  [TW] Stacy Cisneros RN 05/25/20 1310    Row Name                Wound 05/25/20 0845 Right shoulder Other (comment)    Wound - Properties Group Date first assessed: 05/25/20 [TW] Time first assessed: 0845 [TW] Present on Hospital Admission: Y [TW] Side: Right [TW] Location: shoulder [TW], outer shoulder with open area noted  Primary Wound Type: Other [TW], wound, open  Recorded by:  [TW] Stacy Cisneros RN 05/25/20 1312    Row Name                Wound 05/25/20 0845 scalp Other (comment)    Wound - Properties Group Date first assessed: 05/25/20 [TW] Time first assessed: 0845 [TW] Present on Hospital Admission: Y [TW] Location: scalp [TW] Primary Wound Type: Other [TW], wound x 2 top of head; one with partial yellow slough  Recorded by:  [TW] Stacy Cisneros RN 05/25/20 1314    Row Name 06/09/20 1558             Outcome Summary/Treatment Plan (PT)    Daily Summary of Progress (PT)  progress toward functional goals as expected  -RF      Anticipated Equipment Needs at Discharge (PT)  -- tbd  -RF      Anticipated Discharge Disposition (PT)  home with assist  -RF      Recorded by [RF] Jossy Soriano PTA 06/09/20 8132        User Key  (r) = Recorded By, (t) = Taken By, (c) = Cosigned By    Initials Name Effective Dates Discipline    TW Stacy Cisneros RN 06/16/16 -  Nurse    RF Jossy Soriano PTA 03/07/18 -  PT          Wound 05/25/20 0845 Right  anterior toe Arterial Ulcer (Active)   Dressing Appearance open to air 6/9/2020  8:35 AM   Base dry;non-blanchable 6/9/2020  8:35 AM   Periwound dry;warm 6/9/2020  8:35 AM   Periwound Temperature warm 6/9/2020  8:35 AM   Periwound Skin Turgor firm 6/9/2020  8:35 AM   Edges irregular 6/9/2020  8:35 AM   Drainage Amount none 6/9/2020  8:35 AM   Care, Wound cleansed with;sterile normal saline 6/9/2020  8:35 AM   Dressing Care, Wound open to air 6/9/2020  8:35 AM   Periwound Care, Wound topical treatment applied 6/9/2020  8:35 AM       Wound 05/25/20 0845 Right anterior;distal;lower ankle Venous Ulcer (Active)   Dressing Appearance dry;intact 6/9/2020  8:35 AM   Periwound dry 6/9/2020  8:35 AM   Periwound Temperature warm 6/9/2020  8:35 AM   Periwound Skin Turgor firm 6/9/2020  8:35 AM   Edges irregular 6/9/2020  8:35 AM   Drainage Amount none 6/9/2020  8:35 AM   Care, Wound cleansed with;sterile normal saline 6/9/2020  8:35 AM   Dressing Care, Wound dressing changed 6/9/2020  8:35 AM   Periwound Care, Wound topical treatment applied 6/9/2020  8:35 AM       Wound 05/25/20 0845 Right anterior;midline;lower leg Venous Ulcer (Active)   Dressing Appearance dry;intact 6/9/2020  8:35 AM   Closure Adhesive bandage 6/9/2020  8:35 AM   Base non-blanchable;purple 6/9/2020  8:35 AM   Periwound dry 6/9/2020  8:35 AM   Periwound Temperature warm 6/9/2020  8:35 AM   Periwound Skin Turgor firm 6/9/2020  8:35 AM   Edges irregular 6/9/2020  8:35 AM   Drainage Amount scant 6/9/2020  8:35 AM   Care, Wound cleansed with;sterile normal saline 6/9/2020  8:35 AM   Dressing Care, Wound dressing changed 6/9/2020  8:35 AM   Periwound Care, Wound topical treatment applied 6/9/2020  8:35 AM       Wound 05/25/20 0845 Right lower abdomen Other (comment) (Active)   Dressing Appearance dry;intact 6/9/2020  8:35 AM   Periwound dry 6/9/2020  8:35 AM   Periwound Temperature warm 6/9/2020  8:35 AM   Periwound Skin Turgor firm 6/9/2020  8:35 AM   Edges  irregular 6/9/2020  8:35 AM   Drainage Amount none 6/9/2020  8:35 AM   Care, Wound cleansed with;sterile normal saline 6/9/2020  8:35 AM   Dressing Care, Wound dressing changed 6/9/2020  8:35 AM   Periwound Care, Wound topical treatment applied 6/9/2020  8:35 AM       Wound 05/25/20 0845 Right mid axillary Other (comment) (Active)   Dressing Appearance open to air 6/9/2020  8:35 AM   Base pink;dry;clean 6/9/2020  8:35 AM   Periwound pink;intact 6/9/2020  8:35 AM   Periwound Temperature warm 6/9/2020  8:35 AM   Periwound Skin Turgor soft 6/9/2020  8:35 AM   Edges irregular 6/9/2020  8:35 AM   Drainage Amount none 6/9/2020  8:35 AM   Care, Wound cleansed with;sterile normal saline 6/9/2020  8:35 AM   Dressing Care, Wound open to air 6/9/2020  8:35 AM   Periwound Care, Wound topical treatment applied 6/9/2020  8:35 AM       Wound 05/25/20 0845 Right shoulder Other (comment) (Active)   Dressing Appearance dry;intact 6/9/2020  8:35 AM   Base dry;scab 6/9/2020  8:35 AM   Periwound dry 6/9/2020  8:35 AM   Periwound Temperature warm 6/9/2020  8:35 AM   Periwound Skin Turgor firm 6/9/2020  8:35 AM   Edges irregular 6/9/2020  8:35 AM   Drainage Amount none 6/9/2020  8:35 AM   Care, Wound cleansed with;sterile normal saline 6/9/2020  8:35 AM   Dressing Care, Wound open to air 6/9/2020  8:35 AM   Periwound Care, Wound topical treatment applied 6/9/2020  8:35 AM       Wound 05/25/20 0845 scalp Other (comment) (Active)   Dressing Appearance open to air 6/9/2020  8:35 AM   Closure Open to air 6/9/2020  8:35 AM   Base dry;scab 6/9/2020  8:35 AM   Periwound dry 6/9/2020  8:35 AM   Periwound Temperature warm 6/9/2020  8:35 AM   Drainage Characteristics/Odor yellow 6/9/2020  8:35 AM   Drainage Amount none 6/9/2020  8:35 AM   Care, Wound cleansed with;sterile normal saline 6/9/2020  8:35 AM   Dressing Care, Wound open to air 6/9/2020  8:35 AM   Periwound Care, Wound topical treatment applied 6/9/2020  8:35 AM           Physical Therapy  Education                 Title: PT OT SLP Therapies (Done)     Topic: Physical Therapy (Done)     Point: Mobility training (Done)     Description:   Instruct learner(s) on safety and technique for assisting patient out of bed, chair or wheelchair.  Instruct in the proper use of assistive devices, such as walker, crutches, cane or brace.              Patient Friendly Description:   It's important to get you on your feet again, but we need to do so in a way that is safe for you. Falling has serious consequences, and your personal safety is the most important thing of all.        When it's time to get out of bed, one of us or a family member will sit next to you on the bed to give you support.     If your doctor or nurse tells you to use a walker, crutches, a cane, or a brace, be sure you use it every time you get out of bed, even if you think you don't need it.    Learning Progress Summary           Patient Acceptance, E,TB, VU by RF at 6/9/2020 1608    Acceptance, E,TB, VU by RF at 6/8/2020 1557    Acceptance, E,TB, VU by BT at 6/7/2020 0927    Acceptance, E, VU by CC at 6/5/2020 1513    Acceptance, E,TB, VU by CT at 6/4/2020 1346    Acceptance, E, VU by KB at 6/3/2020 1326    Acceptance, E,TB, VU by CT at 6/2/2020 1413                   Point: Home exercise program (Done)     Description:   Instruct learner(s) on appropriate technique for monitoring, assisting and/or progressing patient with therapeutic exercises and activities.              Learning Progress Summary           Patient Acceptance, E,TB, VU by RF at 6/9/2020 1608    Acceptance, E,TB, VU by RF at 6/8/2020 1557    Acceptance, E,TB, VU by BT at 6/7/2020 0927    Acceptance, E, VU by CC at 6/5/2020 1513    Acceptance, E,TB, VU by CT at 6/4/2020 1346    Acceptance, E, VU by KB at 6/3/2020 1326    Acceptance, E,TB, VU by CT at 6/2/2020 1413                   Point: Body mechanics (Done)     Description:   Instruct learner(s) on proper positioning and spine  alignment for patient and/or caregiver during mobility tasks and/or exercises.              Learning Progress Summary           Patient Acceptance, E,TB, VU by RF at 6/9/2020 1608    Acceptance, E,TB, VU by RF at 6/8/2020 1557    Acceptance, E,TB, VU by BT at 6/7/2020 0927    Acceptance, E, VU by CC at 6/5/2020 1513    Acceptance, E,TB, VU by CT at 6/4/2020 1346    Acceptance, E, VU by KB at 6/3/2020 1326    Acceptance, E,TB, VU by CT at 6/2/2020 1413                   Point: Precautions (Done)     Description:   Instruct learner(s) on prescribed precautions during mobility and gait tasks              Learning Progress Summary           Patient Acceptance, E,TB, VU by RF at 6/9/2020 1608    Acceptance, E,TB, VU by RF at 6/8/2020 1557    Acceptance, E,TB, VU by BT at 6/7/2020 0927    Acceptance, E, VU by CC at 6/5/2020 1513    Acceptance, E,TB, VU by CT at 6/4/2020 1346    Acceptance, E, VU by KB at 6/3/2020 1326    Acceptance, E,TB, VU by CT at 6/2/2020 1413                               User Key     Initials Effective Dates Name Provider Type Discipline    CT 04/03/18 -  Nury Ochoa, PT Physical Therapist PT    BT 05/22/17 -  Ronan Baldwin, RN Registered Nurse Nurse    RF 03/07/18 -  Jossy Soriano PTA Physical Therapy Assistant PT     07/25/18 -  Ml Hung, RN Registered Nurse Nurse     01/15/20 -  Cheyenne Sanchez, MCKENNA Registered Nurse Nurse                PT Recommendation and Plan  Anticipated Discharge Disposition (PT): home with assist  Therapy Frequency (PT Clinical Impression): 3 times/wk(3-5 times/wk )  Outcome Summary/Treatment Plan (PT)  Daily Summary of Progress (PT): progress toward functional goals as expected  Anticipated Equipment Needs at Discharge (PT): (tbd)  Anticipated Discharge Disposition (PT): home with assist     Time Calculation:   PT Charges     Row Name 06/09/20 1609             Time Calculation    PT Received On  06/09/20  -      PT Goal Re-Cert Due Date  06/16/20  -          Time Calculation- PT    Total Timed Code Minutes- PT  40 minute(s)  -RF        User Key  (r) = Recorded By, (t) = Taken By, (c) = Cosigned By    Initials Name Provider Type    RF Jossy Soriano, AMADEO Physical Therapy Assistant        Therapy Charges for Today     Code Description Service Date Service Provider Modifiers Qty    05297370301 HC PT THER PROC EA 15 MIN 6/8/2020 Jossy Soriano, PTA GP 1    18055095053 HC PT THERAPEUTIC ACT EA 15 MIN 6/8/2020 Jossy Soriano, PTA GP 2    32206952239 HC PT THER PROC EA 15 MIN 6/9/2020 Jossy Soriano, PTA GP 1    33182560575 HC PT THERAPEUTIC ACT EA 15 MIN 6/9/2020 Jossy Soriano, PTA GP 2               Jossy Soriano, PTA  6/9/2020

## 2020-06-09 NOTE — PROGRESS NOTES
Subjective     History:   Angelique Mcdermott is a 39 y.o. female admitted on 5/25/2020 secondary to Olecranon bursa abscess, left     Procedures: None    CC: Follow up hyperkalemia     Patient seen and examined with MCKENNA Joseph. Awake and alert. Working with PT during my during my encounter. Reports some nausea. No reported vomiting. Reports multiple BM's. States she has diuresed well. No reported CP, dyspnea or palpitations. No acute events overnight per RN.     History taken from: patient, chart, and RN.      Objective     Vital Signs  Temp:  [97.6 °F (36.4 °C)-98.8 °F (37.1 °C)] 98.1 °F (36.7 °C)  Heart Rate:  [] 90  Resp:  [18-28] 20  BP: (123-142)/(80-94) 123/80    Intake/Output Summary (Last 24 hours) at 6/9/2020 1643  Last data filed at 6/9/2020 1519  Gross per 24 hour   Intake 1070 ml   Output 2400 ml   Net -1330 ml         Physical Exam:  General:    Awake, alert, in no acute distress, chronically ill appearing   Heart:      Normal S1 and S2. Regular rate and rhythm. No significant murmur, rubs or gallops appreciated.   Lungs:     Respirations regular, even and unlabored. Lungs clear to auscultation B/L. No wheezes, rales or rhonchi.   Abdomen:   Soft. Nontender. No guarding, rebound tenderness or organomegaly noted. Bowel sounds present x 4.   Extremities:  No clubbing, cyanosis or edema noted in RLE. RLE wounds bandaged. (+) LLE AKA. Left elbow swelling much improved with no surrounding erythema.       Results Review:    Results from last 7 days   Lab Units 06/09/20  0353 06/08/20  0519 06/07/20  0750 06/05/20  0324 06/04/20  0423   WBC 10*3/mm3 6.59 7.17 7.65 6.91 7.32   HEMOGLOBIN g/dL 7.3* 7.1* 7.1* 6.9* 6.9*   PLATELETS 10*3/mm3 304 301 293 285 305     Results from last 7 days   Lab Units 06/09/20  0353 06/08/20  0519 06/07/20  0750 06/06/20  1705 06/06/20  1447 06/06/20  0812 06/05/20  0324 06/04/20  0423  06/03/20  0406   SODIUM mmol/L 129* 129* 130*  --   --  128* 132* 133*  --  133*   POTASSIUM  mmol/L 5.1 5.0 5.3* 5.4* 5.8* 6.0* 5.6* 5.3*   < > 6.4*   CHLORIDE mmol/L 97* 96* 100  --   --  101 102 104  --  105   CO2 mmol/L 23.7 27.1 23.7  --   --  19.4* 22.3 23.1  --  21.8*   BUN mg/dL 34* 34* 34*  --   --  37* 34* 36*  --  42*   CREATININE mg/dL 1.24* 1.22* 1.27*  --   --  1.28* 1.28* 1.36*  --  1.39*   CALCIUM mg/dL 8.5* 8.4* 8.5*  --   --  8.4* 8.1* 8.4*  --  8.2*   GLUCOSE mg/dL 238* 326* 256*  --   --  227* 337* 47*  --  221*    < > = values in this interval not displayed.     Results from last 7 days   Lab Units 06/09/20  0353 06/04/20  0423   BILIRUBIN mg/dL <0.2* <0.2*   ALK PHOS U/L 163* 157*   AST (SGOT) U/L 21 35*   ALT (SGPT) U/L 17 24     Results from last 7 days   Lab Units 06/03/20  0406   MAGNESIUM mg/dL 2.4               Imaging Results (Last 24 Hours)     ** No results found for the last 24 hours. **            Medications:    bacitracin  Topical Q12H   buprenorphine-naloxone 1.25 tablet Sublingual Daily   carvedilol 6.25 mg Oral BID With Meals   cholecalciferol 50,000 Units Oral Q7 Days   cloNIDine 0.2 mg Oral Q12H   docusate sodium 100 mg Oral BID   enoxaparin 40 mg Subcutaneous Q24H   fludrocortisone 50 mcg Oral Daily   folic acid 1 mg Oral Daily   insulin aspart 0-7 Units Subcutaneous TID AC   insulin detemir 15 Units Subcutaneous Nightly   lactobacillus acidophilus 1 capsule Oral Daily   lactulose 30 g Oral TID   lactulose 40 g Oral Once   melatonin 10 mg Oral Nightly   metOLazone 5 mg Oral Once   nystatin  Topical Q12H   polyethylene glycol 17 g Oral Daily   rOPINIRole 0.25 mg Oral Nightly   senna-docusate sodium 1 tablet Oral BID   sodium chloride 10 mL Intravenous Q12H   sodium chloride 10 mL Intravenous Q12H   sodium chloride 10 mL Intravenous Q12H              Assessment/Plan   UTI: Urine culture revealing mixed jefferosn. She has been on Rocephin empirically with course now completed.        Nausea, vomiting and abdominal pain: CT abd/pelvis revealed no acute abnormalities with  extensive soft tissue edema throughout the body wall and mesentary with no ascites, hepatosplenomegaly, cholecystectomy, hysterectomy, likely prior appendectomy, RLQ hernia repair and nonobstructing right renal calculi. Gastric emptying study is normal. Symptoms overall improved. Cont treatment of constipation.     DM II, insulin dependent with hyperglycemia: HgbA1c is 11.1. Pt admits to noncompliance with diabetic diet at home. Diabetes educator consulted. Basal insulin stopped as her BG decreased but it has now trended back upward. Restarted basal insulin at reduced dose yesterday. Will slightly increase today. Cont SSI with Accuchecks.      Essential HTN: BP elevated upon admission but much improved and now stable and controlled. Cont home Coreg and clonidine.  Holding home lisinopril in the setting of hyperkalemia. Cont PRN IV hydralazine.      SARINA vs CKD III with proteinuria: Possibly 2/2 above. No hydronephrosis on CT. Holding home lisinopril. Creatine overall stable today. Repeat labs in the AM.     Hyperkalemia: Initially suspected to be 2/2 severe constipation. 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 Improved with fludrocortisone. Cont bowel regimen. Nephrology input appreciated.       Anion gap metabolic acidosis: Possibly 2/2 above. Now resolved.       Mild troponin elevation with hx of CAD: No acute ischemic changes on EKG. Normalized on repeat. Echo reveals an EF of 56-60%, grade I diastolic dysfunction and mild TR. Pt denies CP. Monitor on telemetry.       Hyponatremia: See above treatment. Nephrology has held diuretics today.       Normocytic anemia: Iron deficient and nephrology ordered a dose of Venofer. Folate and Vit D are low and supplementation has been started. Stable today. Repeat CBC in the AM.      Substance abuse: UDS positive for amphetamines and buprenorphine. Pt is prescribed Suboxone and admits to recent methamphetamine abuse. Denies recent IVDA. Declines any  information regarding substance abuse programs.      Cirrhosis with hx of Hep B and C: Viral hepatitis panel reveals Hep C Ab reactivity. Will need outpatient follow up with GI.     Left elbow effusion with concern for bursitis: No acute findings on xray. Records from [facility] reveal MRSA on cultures. Surgical intervention planned but pt refused, stating she did not want to be NPO for an extended period of time. She later became agreeable to surgery but she clinically improved with IV antibiotics and ortho did not think she needed surgery. Will stop Vanc as she has now completed a 14 day course of treatment. Ortho has signed off with input appreciated.     Severe constipation: Improved. Cont aggressive bowel regimen.     Hypomagnesemia: Improved with supplementation.      DVT PPX: SQ Lovenox          Jeremiah Medina DO  06/09/20  16:43

## 2020-06-09 NOTE — PLAN OF CARE
Patient complains of intermittent nausea; PRN medication utilized; No other complaints at this time. Will continue to monitor and follow plan of care

## 2020-06-09 NOTE — PLAN OF CARE
Pt continues to be noncompliant w/ medication administration, wedge use, and turns. Pt educated to no avail. Will continue to monitor.

## 2020-06-09 NOTE — PROGRESS NOTES
Antimicrobial Length of Therapy:    Day 15 vancomycin IV (through 6/13)    Thank you.  Augusta Dobson, Pharm.D.  6/9/2020  08:43

## 2020-06-09 NOTE — PROGRESS NOTES
Nephrology Progress Note      Subjective     Patient feels fine, no complaints.     Objective       Vital signs :     Temp:  [97.6 °F (36.4 °C)-98.8 °F (37.1 °C)] 97.6 °F (36.4 °C)  Heart Rate:  [] 91  Resp:  [18-28] 28  BP: (128-147)/() 142/92      Intake/Output Summary (Last 24 hours) at 6/9/2020 0820  Last data filed at 6/9/2020 0500  Gross per 24 hour   Intake 1540 ml   Output 1500 ml   Net 40 ml       Physical Exam:    General Appearance : not in acute distress  Lungs : clear to auscultation, respirations regular  Heart :  regular rhythm & normal rate, normal S1, S2 and no murmur, no rub  Abdomen : normal bowel sounds, no masses, no hepatomegaly, no splenomegaly, soft non-tender and no guarding  Extremities : moves extremities well, no edema, no cyanosis and no redness  Skin :  no bleeding, bruising or rash  Neurologic :   orientated to person, place, time and situation, Grossly no focal deficits      Laboratory Data :     Albumin Albumin   Date Value Ref Range Status   06/09/2020 2.49 (L) 3.50 - 5.20 g/dL Final      Magnesium No results found for: MG       PTH               No results found for: PTH    CBC and coagulation:  Results from last 7 days   Lab Units 06/09/20  0353 06/08/20  0519 06/07/20  0750   WBC 10*3/mm3 6.59 7.17 7.65   HEMOGLOBIN g/dL 7.3* 7.1* 7.1*   HEMATOCRIT % 26.7* 25.0* 24.7*   MCV fL 89.0 86.5 86.1   MCHC g/dL 27.3* 28.4* 28.7*   PLATELETS 10*3/mm3 304 301 293     Acid/base balance:      Renal and electrolytes:  Results from last 7 days   Lab Units 06/09/20  0353 06/08/20  0519 06/07/20  0750  06/06/20  0812 06/05/20  0324  06/03/20  0406   SODIUM mmol/L 129* 129* 130*  --  128* 132*   < > 133*   POTASSIUM mmol/L 5.1 5.0 5.3*   < > 6.0* 5.6*   < > 6.4*   MAGNESIUM mg/dL  --   --   --   --   --   --   --  2.4   CHLORIDE mmol/L 97* 96* 100  --  101 102   < > 105   CO2 mmol/L 23.7 27.1 23.7  --  19.4* 22.3   < > 21.8*   BUN mg/dL 34* 34* 34*  --  37* 34*   < > 42*   CREATININE  mg/dL 1.24* 1.22* 1.27*  --  1.28* 1.28*   < > 1.39*   EGFR IF NONAFRICN AM mL/min/1.73 48* 49* 46*  --  46* 46*   < > 42*   CALCIUM mg/dL 8.5* 8.4* 8.5*  --  8.4* 8.1*   < > 8.2*    < > = values in this interval not displayed.     Estimated Creatinine Clearance: 69.5 mL/min (A) (by C-G formula based on SCr of 1.24 mg/dL (H)).    Liver and pancreatic function:  Results from last 7 days   Lab Units 06/09/20  0353 06/05/20  1022 06/04/20  0423   ALBUMIN g/dL 2.49* 2.5* 2.45*   BILIRUBIN mg/dL <0.2*  --  <0.2*   ALK PHOS U/L 163*  --  157*   AST (SGOT) U/L 21  --  35*   ALT (SGPT) U/L 17  --  24         Cardiac:      Liver and pancreatic function:  Results from last 7 days   Lab Units 06/09/20  0353 06/05/20  1022 06/04/20  0423   ALBUMIN g/dL 2.49* 2.5* 2.45*   BILIRUBIN mg/dL <0.2*  --  <0.2*   ALK PHOS U/L 163*  --  157*   AST (SGOT) U/L 21  --  35*   ALT (SGPT) U/L 17  --  24       Medications :       bacitracin  Topical Q12H   buprenorphine-naloxone 1.25 tablet Sublingual Daily   carvedilol 6.25 mg Oral BID With Meals   cholecalciferol 50,000 Units Oral Q7 Days   cloNIDine 0.2 mg Oral Q12H   docusate sodium 100 mg Oral BID   enoxaparin 40 mg Subcutaneous Q24H   fludrocortisone 50 mcg Oral Daily   folic acid 1 mg Oral Daily   insulin aspart 0-7 Units Subcutaneous TID AC   insulin detemir 10 Units Subcutaneous Nightly   lactobacillus acidophilus 1 capsule Oral Daily   lactulose 30 g Oral TID   lactulose 40 g Oral Once   melatonin 10 mg Oral Nightly   metOLazone 5 mg Oral Once   nystatin  Topical Q12H   polyethylene glycol 17 g Oral Daily   rOPINIRole 0.25 mg Oral Nightly   senna-docusate sodium 1 tablet Oral BID   sodium chloride 10 mL Intravenous Q12H   sodium chloride 10 mL Intravenous Q12H   sodium chloride 10 mL Intravenous Q12H   vancomycin 1,000 mg Intravenous Once   Vancomycin Pharmacy Intermittent Dosing  Does not apply Daily            Assessment/Plan     1. Persistent Hyperkalemia  2. Sever  constipation  3. SARINA on CKD  4. DM-II  5. Essential hypertension     HyperKalemia and SARINA is stable. Serology is essentially negative. Proteinuria likely from DKD.   She appears to be euvolumic now, will DC oral diuretics.      Hyperkalemia likely 2/2 sever constipation, less likely from RTA.   UAG 33, suggestive of RTA, likely type IV with hyporeninemic hypoaldosteronism  Baseline Cr <1, admitted with 1.3, 2.9 G proteinuria, likely diabetic nephrosclerosis  -DC bumex  -cotinue on docusate, senna  -once dose Kayexylate  -continue fludrocortisone 50mcg daily      Jonnathan Kinney MD  06/09/20  08:20

## 2020-06-09 NOTE — PROGRESS NOTES
Pharmacokinetics Service Note:    Ms. Mcdermott continues on day 15 of vancomycin for her L elbow septic bursitis.  A 38.75 hour post infusion random level was reported as 16.9 mg/L this AM.  The serum Cr is stable at 1.24 mg/dL.  Will redose with 1 gm today to maintain adequate levels and will repeat a random level Thursday AM if treatment continues.  Treatment is currently set to continue through 6/13.      Thank you.  Augusta Dobson, Pharm.D.  6/9/2020  08:18

## 2020-06-10 ENCOUNTER — APPOINTMENT (OUTPATIENT)
Dept: GENERAL RADIOLOGY | Facility: HOSPITAL | Age: 40
End: 2020-06-10

## 2020-06-10 LAB
ALBUMIN 24H MFR UR ELPH: 36.4 %
ALPHA1 GLOB 24H MFR UR ELPH: 3.9 %
ALPHA2 GLOB 24H MFR UR ELPH: 9.2 %
ANION GAP SERPL CALCULATED.3IONS-SCNC: 7 MMOL/L (ref 5–15)
ANISOCYTOSIS BLD QL: NORMAL
B-GLOBULIN MFR UR ELPH: 23.3 %
BASOPHILS # BLD AUTO: 0.11 10*3/MM3 (ref 0–0.2)
BASOPHILS NFR BLD AUTO: 1.4 % (ref 0–1.5)
BUN BLD-MCNC: 39 MG/DL (ref 6–20)
BUN/CREAT SERPL: 29.3 (ref 7–25)
CALCIUM SPEC-SCNC: 8.5 MG/DL (ref 8.6–10.5)
CHLORIDE SERPL-SCNC: 98 MMOL/L (ref 98–107)
CO2 SERPL-SCNC: 25 MMOL/L (ref 22–29)
CREAT BLD-MCNC: 1.33 MG/DL (ref 0.57–1)
DEPRECATED RDW RBC AUTO: 59.4 FL (ref 37–54)
EOSINOPHIL # BLD AUTO: 0.46 10*3/MM3 (ref 0–0.4)
EOSINOPHIL NFR BLD AUTO: 6 % (ref 0.3–6.2)
ERYTHROCYTE [DISTWIDTH] IN BLOOD BY AUTOMATED COUNT: 18.3 % (ref 12.3–15.4)
GAMMA GLOB 24H MFR UR ELPH: 27.2 %
GBM IGG SER-ACNC: 5 UNITS (ref 0–20)
GFR SERPL CREATININE-BSD FRML MDRD: 44 ML/MIN/1.73
GLUCOSE BLD-MCNC: 264 MG/DL (ref 65–99)
GLUCOSE BLDC GLUCOMTR-MCNC: 162 MG/DL (ref 70–130)
GLUCOSE BLDC GLUCOMTR-MCNC: 191 MG/DL (ref 70–130)
GLUCOSE BLDC GLUCOMTR-MCNC: 205 MG/DL (ref 70–130)
GLUCOSE BLDC GLUCOMTR-MCNC: 348 MG/DL (ref 70–130)
HCT VFR BLD AUTO: 26.8 % (ref 34–46.6)
HGB BLD-MCNC: 7.3 G/DL (ref 12–15.9)
HYPOCHROMIA BLD QL: NORMAL
IMM GRANULOCYTES # BLD AUTO: 0.01 10*3/MM3 (ref 0–0.05)
IMM GRANULOCYTES NFR BLD AUTO: 0.1 % (ref 0–0.5)
LYMPHOCYTES # BLD AUTO: 2.83 10*3/MM3 (ref 0.7–3.1)
LYMPHOCYTES NFR BLD AUTO: 36.8 % (ref 19.6–45.3)
Lab: ABNORMAL
M PROTEIN MFR UR ELPH: ABNORMAL %
MCH RBC QN AUTO: 24.1 PG (ref 26.6–33)
MCHC RBC AUTO-ENTMCNC: 27.2 G/DL (ref 31.5–35.7)
MCV RBC AUTO: 88.4 FL (ref 79–97)
MONOCYTES # BLD AUTO: 0.54 10*3/MM3 (ref 0.1–0.9)
MONOCYTES NFR BLD AUTO: 7 % (ref 5–12)
NEUTROPHILS # BLD AUTO: 3.75 10*3/MM3 (ref 1.7–7)
NEUTROPHILS NFR BLD AUTO: 48.7 % (ref 42.7–76)
NRBC BLD AUTO-RTO: 0 /100 WBC (ref 0–0.2)
NT-PROBNP SERPL-MCNC: 1680 PG/ML (ref 5–450)
PLATELET # BLD AUTO: 297 10*3/MM3 (ref 140–450)
PMV BLD AUTO: 10.1 FL (ref 6–12)
POTASSIUM BLD-SCNC: 5.6 MMOL/L (ref 3.5–5.2)
PROT 24H UR-MRATE: 1435 MG/24 HR (ref 30–150)
PROT UR-MCNC: 84.4 MG/DL
RBC # BLD AUTO: 3.03 10*6/MM3 (ref 3.77–5.28)
SMALL PLATELETS BLD QL SMEAR: ADEQUATE
SODIUM BLD-SCNC: 130 MMOL/L (ref 136–145)
WBC NRBC COR # BLD: 7.7 10*3/MM3 (ref 3.4–10.8)

## 2020-06-10 PROCEDURE — 63710000001 INSULIN ASPART PER 5 UNITS: Performed by: INTERNAL MEDICINE

## 2020-06-10 PROCEDURE — 63710000001 INSULIN DETEMIR PER 5 UNITS: Performed by: INTERNAL MEDICINE

## 2020-06-10 PROCEDURE — 85025 COMPLETE CBC W/AUTO DIFF WBC: CPT | Performed by: INTERNAL MEDICINE

## 2020-06-10 PROCEDURE — 25010000002 PROMETHAZINE PER 50 MG: Performed by: INTERNAL MEDICINE

## 2020-06-10 PROCEDURE — 82962 GLUCOSE BLOOD TEST: CPT

## 2020-06-10 PROCEDURE — 63710000001 DIPHENHYDRAMINE PER 50 MG: Performed by: INTERNAL MEDICINE

## 2020-06-10 PROCEDURE — 71045 X-RAY EXAM CHEST 1 VIEW: CPT

## 2020-06-10 PROCEDURE — 94799 UNLISTED PULMONARY SVC/PX: CPT

## 2020-06-10 PROCEDURE — 80048 BASIC METABOLIC PNL TOTAL CA: CPT | Performed by: INTERNAL MEDICINE

## 2020-06-10 PROCEDURE — 71045 X-RAY EXAM CHEST 1 VIEW: CPT | Performed by: RADIOLOGY

## 2020-06-10 PROCEDURE — 25010000002 ENOXAPARIN PER 10 MG: Performed by: INTERNAL MEDICINE

## 2020-06-10 PROCEDURE — 83880 ASSAY OF NATRIURETIC PEPTIDE: CPT | Performed by: INTERNAL MEDICINE

## 2020-06-10 PROCEDURE — 99232 SBSQ HOSP IP/OBS MODERATE 35: CPT | Performed by: INTERNAL MEDICINE

## 2020-06-10 PROCEDURE — 85007 BL SMEAR W/DIFF WBC COUNT: CPT | Performed by: INTERNAL MEDICINE

## 2020-06-10 RX ORDER — FLUDROCORTISONE ACETATE 0.1 MG/1
100 TABLET ORAL DAILY
Status: DISCONTINUED | OUTPATIENT
Start: 2020-06-10 | End: 2020-06-15 | Stop reason: HOSPADM

## 2020-06-10 RX ADMIN — PROMETHAZINE HYDROCHLORIDE 12.5 MG: 25 INJECTION INTRAMUSCULAR; INTRAVENOUS at 15:04

## 2020-06-10 RX ADMIN — INSULIN ASPART 5 UNITS: 100 INJECTION, SOLUTION INTRAVENOUS; SUBCUTANEOUS at 11:21

## 2020-06-10 RX ADMIN — BACITRACIN: 500 OINTMENT TOPICAL at 20:10

## 2020-06-10 RX ADMIN — DOCUSATE SODIUM 100 MG: 100 CAPSULE, LIQUID FILLED ORAL at 20:09

## 2020-06-10 RX ADMIN — INSULIN ASPART 2 UNITS: 100 INJECTION, SOLUTION INTRAVENOUS; SUBCUTANEOUS at 16:32

## 2020-06-10 RX ADMIN — Medication 10 MG: at 20:10

## 2020-06-10 RX ADMIN — CLONIDINE HYDROCHLORIDE 0.2 MG: 0.2 TABLET ORAL at 20:09

## 2020-06-10 RX ADMIN — DIPHENHYDRAMINE HYDROCHLORIDE 25 MG: 25 CAPSULE ORAL at 20:10

## 2020-06-10 RX ADMIN — DOCUSATE SODIUM 100 MG: 100 CAPSULE, LIQUID FILLED ORAL at 08:21

## 2020-06-10 RX ADMIN — NYSTATIN: 100000 POWDER TOPICAL at 08:18

## 2020-06-10 RX ADMIN — INSULIN DETEMIR 20 UNITS: 100 INJECTION, SOLUTION SUBCUTANEOUS at 20:48

## 2020-06-10 RX ADMIN — FOLIC ACID 1 MG: 1 TABLET ORAL at 08:18

## 2020-06-10 RX ADMIN — ENOXAPARIN SODIUM 40 MG: 40 INJECTION SUBCUTANEOUS at 08:17

## 2020-06-10 RX ADMIN — CARVEDILOL 6.25 MG: 6.25 TABLET, FILM COATED ORAL at 08:17

## 2020-06-10 RX ADMIN — DIPHENHYDRAMINE HYDROCHLORIDE 25 MG: 25 CAPSULE ORAL at 12:28

## 2020-06-10 RX ADMIN — FLUDROCORTISONE ACETATE 100 MCG: 0.1 TABLET ORAL at 08:18

## 2020-06-10 RX ADMIN — BACITRACIN: 500 OINTMENT TOPICAL at 08:18

## 2020-06-10 RX ADMIN — CARVEDILOL 6.25 MG: 6.25 TABLET, FILM COATED ORAL at 16:33

## 2020-06-10 RX ADMIN — PROMETHAZINE HYDROCHLORIDE 12.5 MG: 25 INJECTION INTRAMUSCULAR; INTRAVENOUS at 01:30

## 2020-06-10 RX ADMIN — CLONIDINE HYDROCHLORIDE 0.2 MG: 0.2 TABLET ORAL at 08:17

## 2020-06-10 RX ADMIN — ROPINIROLE HYDROCHLORIDE 0.25 MG: 0.25 TABLET, FILM COATED ORAL at 20:09

## 2020-06-10 RX ADMIN — LACTULOSE 30 G: 10 SOLUTION ORAL at 14:56

## 2020-06-10 RX ADMIN — DOCUSATE SODIUM 50 MG AND SENNOSIDES 8.6 MG 1 TABLET: 8.6; 5 TABLET, FILM COATED ORAL at 20:09

## 2020-06-10 RX ADMIN — NYSTATIN: 100000 POWDER TOPICAL at 20:10

## 2020-06-10 RX ADMIN — TIZANIDINE 4 MG: 4 TABLET ORAL at 15:04

## 2020-06-10 RX ADMIN — DOCUSATE SODIUM 50 MG AND SENNOSIDES 8.6 MG 1 TABLET: 8.6; 5 TABLET, FILM COATED ORAL at 08:22

## 2020-06-10 RX ADMIN — PROMETHAZINE HYDROCHLORIDE 12.5 MG: 25 INJECTION INTRAMUSCULAR; INTRAVENOUS at 08:18

## 2020-06-10 RX ADMIN — LACTULOSE 30 G: 10 SOLUTION ORAL at 20:09

## 2020-06-10 RX ADMIN — LACTULOSE 30 G: 10 SOLUTION ORAL at 08:18

## 2020-06-10 RX ADMIN — BUPRENORPHINE HYDROCHLORIDE AND NALOXONE HYDROCHLORIDE 1.25 TABLET: 8; 2 TABLET SUBLINGUAL at 08:17

## 2020-06-10 RX ADMIN — Medication 1 CAPSULE: at 08:21

## 2020-06-10 RX ADMIN — INSULIN ASPART 3 UNITS: 100 INJECTION, SOLUTION INTRAVENOUS; SUBCUTANEOUS at 08:17

## 2020-06-10 NOTE — PROGRESS NOTES
Nephrology Progress Note      Subjective     Patient feels fine, no complaints.     Objective       Vital signs :     Temp:  [98 °F (36.7 °C)-98.4 °F (36.9 °C)] 98 °F (36.7 °C)  Heart Rate:  [84-94] 90  Resp:  [18-20] 18  BP: (109-134)/(72-94) 109/86      Intake/Output Summary (Last 24 hours) at 6/10/2020 0725  Last data filed at 6/10/2020 0500  Gross per 24 hour   Intake 2154.6 ml   Output 900 ml   Net 1254.6 ml       Physical Exam:    General Appearance : not in acute distress  Lungs : clear to auscultation, respirations regular  Heart :  regular rhythm & normal rate, normal S1, S2 and no murmur, no rub  Abdomen : normal bowel sounds, no masses, no hepatomegaly, no splenomegaly, soft non-tender and no guarding  Extremities : moves extremities well, no edema, no cyanosis and no redness  Skin :  no bleeding, bruising or rash  Neurologic :   orientated to person, place, time and situation, Grossly no focal deficits      Laboratory Data :     Albumin Albumin   Date Value Ref Range Status   06/09/2020 2.49 (L) 3.50 - 5.20 g/dL Final      Magnesium No results found for: MG       PTH               No results found for: PTH    CBC and coagulation:  Results from last 7 days   Lab Units 06/10/20  0337 06/09/20  0353 06/08/20  0519   WBC 10*3/mm3 7.70 6.59 7.17   HEMOGLOBIN g/dL 7.3* 7.3* 7.1*   HEMATOCRIT % 26.8* 26.7* 25.0*   MCV fL 88.4 89.0 86.5   MCHC g/dL 27.2* 27.3* 28.4*   PLATELETS 10*3/mm3 297 304 301     Acid/base balance:      Renal and electrolytes:  Results from last 7 days   Lab Units 06/10/20  0337 06/09/20  0353 06/08/20  0519 06/07/20  0750  06/06/20  0812   SODIUM mmol/L 130* 129* 129* 130*  --  128*   POTASSIUM mmol/L 5.6* 5.1 5.0 5.3*   < > 6.0*   CHLORIDE mmol/L 98 97* 96* 100  --  101   CO2 mmol/L 25.0 23.7 27.1 23.7  --  19.4*   BUN mg/dL 39* 34* 34* 34*  --  37*   CREATININE mg/dL 1.33* 1.24* 1.22* 1.27*  --  1.28*   EGFR IF NONAFRICN AM mL/min/1.73 44* 48* 49* 46*  --  46*   CALCIUM mg/dL 8.5* 8.5*  8.4* 8.5*  --  8.4*    < > = values in this interval not displayed.     Estimated Creatinine Clearance: 64.9 mL/min (A) (by C-G formula based on SCr of 1.33 mg/dL (H)).    Liver and pancreatic function:  Results from last 7 days   Lab Units 06/09/20  0353 06/07/20 0441 06/05/20  1022 06/04/20  0423   ALBUMIN g/dL 2.49* 2.3* 2.5* 2.45*   BILIRUBIN mg/dL <0.2*  --   --  <0.2*   ALK PHOS U/L 163*  --   --  157*   AST (SGOT) U/L 21  --   --  35*   ALT (SGPT) U/L 17  --   --  24         Cardiac:      Liver and pancreatic function:  Results from last 7 days   Lab Units 06/09/20  0353 06/07/20 0441 06/05/20  1022 06/04/20  0423   ALBUMIN g/dL 2.49* 2.3* 2.5* 2.45*   BILIRUBIN mg/dL <0.2*  --   --  <0.2*   ALK PHOS U/L 163*  --   --  157*   AST (SGOT) U/L 21  --   --  35*   ALT (SGPT) U/L 17  --   --  24       Medications :       bacitracin  Topical Q12H   buprenorphine-naloxone 1.25 tablet Sublingual Daily   carvedilol 6.25 mg Oral BID With Meals   cholecalciferol 50,000 Units Oral Q7 Days   cloNIDine 0.2 mg Oral Q12H   docusate sodium 100 mg Oral BID   enoxaparin 40 mg Subcutaneous Q24H   fludrocortisone 50 mcg Oral Daily   folic acid 1 mg Oral Daily   insulin aspart 0-7 Units Subcutaneous TID AC   insulin detemir 15 Units Subcutaneous Nightly   lactobacillus acidophilus 1 capsule Oral Daily   lactulose 30 g Oral TID   lactulose 40 g Oral Once   melatonin 10 mg Oral Nightly   metOLazone 5 mg Oral Once   nystatin  Topical Q12H   polyethylene glycol 17 g Oral Daily   rOPINIRole 0.25 mg Oral Nightly   senna-docusate sodium 1 tablet Oral BID   sodium chloride 10 mL Intravenous Q12H   sodium chloride 10 mL Intravenous Q12H   sodium chloride 10 mL Intravenous Q12H            Assessment/Plan     1. Persistent Hyperkalemia  2. Sever constipation  3. SARINA on CKD  4. DM-II  5. Essential hypertension     K is 5.6 today, with Cr 1.3. Pt did not have BM in last 2 days and has been refusing for bowl regemine. Educated and counseled,  will increase fludrocortisone. I have also ordered CXR and probnp.     Hyperkalemia likely 2/2 sever constipation, less likely from RTA.   UAG 33, suggestive of RTA, likely type IV with hyporeninemic hypoaldosteronism  Baseline Cr <1, admitted with 1.3, 2.9 G proteinuria, likely diabetic nephrosclerosis  Serology is negative  -bnp and CXR  -cotinue on docusate, senna  -increase fludrocortisone 100mcg daily      Jonnathan Kinney MD  06/10/20  07:25

## 2020-06-10 NOTE — PLAN OF CARE
Patient's potassium increased overnight. At first was refusing her stool softeners, but is now agreeable to taking bowel medicines after being educated by Dr. Kinney on the subject. Complains of nausea intermittently. PRN meds utilized and helpful per patient. Will continue to monitor and follow plan of care.

## 2020-06-10 NOTE — PROGRESS NOTES
Subjective     History:   Angelique Mcdermott is a 39 y.o. female admitted on 5/25/2020 secondary to Olecranon bursa abscess, left     Procedures: None    CC: Follow up hyperkalemia     Patient seen and examined with MCKENNA Joseph. Awake and alert. Reports some nausea. No reported vomiting. No reported CP, dyspnea or palpitations. She has been refusing some of her bowel regimen with decreased frequency of BM's. No acute events overnight per RN.     History taken from: patient, chart, and RN.      Objective     Vital Signs  Temp:  [98 °F (36.7 °C)-98.4 °F (36.9 °C)] 98 °F (36.7 °C)  Heart Rate:  [83-94] 87  Resp:  [18-20] 18  BP: (109-128)/(72-86) 126/81    Intake/Output Summary (Last 24 hours) at 6/10/2020 1716  Last data filed at 6/10/2020 1649  Gross per 24 hour   Intake 2561.01 ml   Output --   Net 2561.01 ml         Physical Exam: Unchanged from previous   General:    Awake, alert, in no acute distress, chronically ill appearing   Heart:      Normal S1 and S2. Regular rate and rhythm. No significant murmur, rubs or gallops appreciated.   Lungs:     Respirations regular, even and unlabored. Lungs clear to auscultation B/L. No wheezes, rales or rhonchi.   Abdomen:   Soft. Nontender. No guarding, rebound tenderness or organomegaly noted. Bowel sounds present x 4.   Extremities:  No clubbing, cyanosis or edema noted in RLE. RLE wounds bandaged. (+) LLE AKA. Left elbow swelling much improved with no surrounding erythema.       Results Review:    Results from last 7 days   Lab Units 06/10/20  0337 06/09/20  0353 06/08/20  0519 06/07/20  0750 06/05/20  0324 06/04/20  0423   WBC 10*3/mm3 7.70 6.59 7.17 7.65 6.91 7.32   HEMOGLOBIN g/dL 7.3* 7.3* 7.1* 7.1* 6.9* 6.9*   PLATELETS 10*3/mm3 297 304 301 293 285 305     Results from last 7 days   Lab Units 06/10/20  0337 06/09/20  0353 06/08/20  0519 06/07/20  0750 06/06/20  1705 06/06/20  1447 06/06/20  0812 06/05/20  0324 06/04/20  0423   SODIUM mmol/L 130* 129* 129* 130*  --   --   128* 132* 133*   POTASSIUM mmol/L 5.6* 5.1 5.0 5.3* 5.4* 5.8* 6.0* 5.6* 5.3*   CHLORIDE mmol/L 98 97* 96* 100  --   --  101 102 104   CO2 mmol/L 25.0 23.7 27.1 23.7  --   --  19.4* 22.3 23.1   BUN mg/dL 39* 34* 34* 34*  --   --  37* 34* 36*   CREATININE mg/dL 1.33* 1.24* 1.22* 1.27*  --   --  1.28* 1.28* 1.36*   CALCIUM mg/dL 8.5* 8.5* 8.4* 8.5*  --   --  8.4* 8.1* 8.4*   GLUCOSE mg/dL 264* 238* 326* 256*  --   --  227* 337* 47*     Results from last 7 days   Lab Units 06/09/20  0353 06/04/20  0423   BILIRUBIN mg/dL <0.2* <0.2*   ALK PHOS U/L 163* 157*   AST (SGOT) U/L 21 35*   ALT (SGPT) U/L 17 24                   Imaging Results (Last 24 Hours)     Procedure Component Value Units Date/Time    XR Chest 1 View [[id number]] Collected:  06/10/20 0851     Updated:  06/10/20 0910    Narrative:       EXAMINATION: XR CHEST 1 VW-      CLINICAL INDICATION:     ? pulm edema; E11.65-Type 2 diabetes mellitus  with hyperglycemia; Z79.4-Long term (current) use of insulin;  E87.6-Wwac-blsjlagtlb and hyponatremia; B18.2-Chronic viral hepatitis C;  F15.10-Other stimulant abuse, uncomplicated; N17.9-Acute kidney failure,  unspecified; R31.21-Asymptomatic microscopic hematuria; I10-Essential  (primary) hypertension; M71.022-Abscess of bursa, left elbow     TECHNIQUE:  XR CHEST 1 VW-      COMPARISON: 05/25/2020      FINDINGS:      Interstitial edema is noted.   Cardiomegaly and pulmonary vascular congestion.   No pneumothorax.   Small effusions.   No acute osseous findings.          Impression:       CHF/edema.     This report was finalized on 6/10/2020 8:53 AM by Dr. Mekhi Becerra MD.               Medications:    bacitracin  Topical Q12H   buprenorphine-naloxone 1.25 tablet Sublingual Daily   carvedilol 6.25 mg Oral BID With Meals   cholecalciferol 50,000 Units Oral Q7 Days   cloNIDine 0.2 mg Oral Q12H   docusate sodium 100 mg Oral BID   enoxaparin 40 mg Subcutaneous Q24H   fludrocortisone 100 mcg Oral Daily   folic acid 1 mg Oral  Daily   insulin aspart 0-7 Units Subcutaneous TID AC   insulin detemir 20 Units Subcutaneous Nightly   lactobacillus acidophilus 1 capsule Oral Daily   lactulose 30 g Oral TID   lactulose 40 g Oral Once   melatonin 10 mg Oral Nightly   metOLazone 5 mg Oral Once   nystatin  Topical Q12H   polyethylene glycol 17 g Oral Daily   rOPINIRole 0.25 mg Oral Nightly   senna-docusate sodium 1 tablet Oral BID   sodium chloride 10 mL Intravenous Q12H   sodium chloride 10 mL Intravenous Q12H   sodium chloride 10 mL Intravenous Q12H              Assessment/Plan   UTI: Urine culture revealing mixed jefferson. Completed course of Rocephin.         Nausea, vomiting and abdominal pain: CT abd/pelvis revealed no acute abnormalities with extensive soft tissue edema throughout the body wall and mesentary with no ascites, hepatosplenomegaly, cholecystectomy, hysterectomy, likely prior appendectomy, RLQ hernia repair and nonobstructing right renal calculi. Gastric emptying study is normal. Symptoms overall improved. Cont treatment of constipation.     DM II, insulin dependent with hyperglycemia: HgbA1c is 11.1. Pt admits to noncompliance with diabetic diet at home. Diabetes educator consulted. Basal insulin stopped as her BG decreased but it has now trended back upward. Cont to slowly increase basal insulin. Cont SSI with Accuchecks.      Essential HTN: BP elevated upon admission but much improved and now stable and controlled. Cont home Coreg and clonidine.  Holding home lisinopril in the setting of hyperkalemia. Cont PRN IV hydralazine.      SARINA vs CKD III with proteinuria: Possibly 2/2 above. No hydronephrosis on CT. Holding home lisinopril. Creatine overall stable today. Repeat labs in the AM.     Hyperkalemia: Initially suspected to be 2/2 severe constipation. 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 Improved with fludrocortisone. K+ trended back upward today. Pt has been noncompliant with bowel regimen.  Discussed importance of bowel regimen. Nephrology has increased fludrocortisone today. Nephrology input appreciated.       Anion gap metabolic acidosis: Possibly 2/2 above. Now resolved.       Mild troponin elevation with hx of CAD: No acute ischemic changes on EKG. Normalized on repeat. Echo reveals an EF of 56-60%, grade I diastolic dysfunction and mild TR. Pt denies CP. Monitor on telemetry.       Hyponatremia: Stable today. Cont to monitor.      Normocytic anemia: Iron deficient and nephrology ordered a dose of Venofer. Folate and Vit D are low and supplementation has been started. Stable today. Repeat CBC in the AM.      Substance abuse: UDS positive for amphetamines and buprenorphine. Pt is prescribed Suboxone and admits to recent methamphetamine abuse. Denies recent IVDA. Declines any information regarding substance abuse programs.      Cirrhosis with hx of Hep B and C: Viral hepatitis panel reveals Hep C Ab reactivity. Will need outpatient follow up with GI.     Left elbow effusion with concern for bursitis: No acute findings on xray. Records from [facility] reveal MRSA on cultures. Surgical intervention planned but pt refused, stating she did not want to be NPO for an extended period of time. She later became agreeable to surgery but she clinically improved with IV antibiotics and ortho did not think she needed surgery. Course of Vanc and Rocephin now completed. Ortho has signed off with input appreciated.     Severe constipation: Cont aggressive bowel regimen.     Hypomagnesemia: Improved with supplementation.      DVT PPX: SQ Lovenox          Jeremiah Medina,   06/10/20  17:16

## 2020-06-10 NOTE — PLAN OF CARE
Pt continues to refuse various medications, turns, and wedge use. Pt education provided but pt continues to refuse. Will continue to monitor.

## 2020-06-10 NOTE — PROGRESS NOTES
Discharge Planning Assessment   [place]     Patient Name: Angelique Mcdermott  MRN: [id number]  Today's Date: 6/10/2020    Admit Date: 5/25/2020      Discharge Plan     Row Name 06/10/20 0956       Plan    Plan  Pt admitted on 5/25/20.  SS spoke with pt on this date.  Pt lives at home with friends and plans to return home at discharge.  Pt currently does not utilize home health services.  Pt currently utilizes w/c, bsc and hospital bed via unknown provider.  Pt states no PCP.  SS will follow and assist with discharge needs.             GONZALEZ MaldonadoW

## 2020-06-11 LAB
ANION GAP SERPL CALCULATED.3IONS-SCNC: 8.9 MMOL/L (ref 5–15)
ANISOCYTOSIS BLD QL: NORMAL
BASOPHILS # BLD AUTO: 0.09 10*3/MM3 (ref 0–0.2)
BASOPHILS NFR BLD AUTO: 1.4 % (ref 0–1.5)
BUN BLD-MCNC: 44 MG/DL (ref 6–20)
BUN/CREAT SERPL: 30.6 (ref 7–25)
CALCIUM SPEC-SCNC: 8.8 MG/DL (ref 8.6–10.5)
CHLORIDE SERPL-SCNC: 99 MMOL/L (ref 98–107)
CO2 SERPL-SCNC: 22.1 MMOL/L (ref 22–29)
CREAT BLD-MCNC: 1.44 MG/DL (ref 0.57–1)
DEPRECATED RDW RBC AUTO: 59.8 FL (ref 37–54)
EOSINOPHIL # BLD AUTO: 0.4 10*3/MM3 (ref 0–0.4)
EOSINOPHIL NFR BLD AUTO: 6.1 % (ref 0.3–6.2)
ERYTHROCYTE [DISTWIDTH] IN BLOOD BY AUTOMATED COUNT: 18.4 % (ref 12.3–15.4)
GFR SERPL CREATININE-BSD FRML MDRD: 40 ML/MIN/1.73
GLUCOSE BLD-MCNC: 244 MG/DL (ref 65–99)
GLUCOSE BLDC GLUCOMTR-MCNC: 180 MG/DL (ref 70–130)
GLUCOSE BLDC GLUCOMTR-MCNC: 191 MG/DL (ref 70–130)
GLUCOSE BLDC GLUCOMTR-MCNC: 203 MG/DL (ref 70–130)
GLUCOSE BLDC GLUCOMTR-MCNC: 217 MG/DL (ref 70–130)
GLUCOSE BLDC GLUCOMTR-MCNC: 324 MG/DL (ref 70–130)
HCT VFR BLD AUTO: 28.9 % (ref 34–46.6)
HGB BLD-MCNC: 7.9 G/DL (ref 12–15.9)
HYPOCHROMIA BLD QL: NORMAL
IMM GRANULOCYTES # BLD AUTO: 0.01 10*3/MM3 (ref 0–0.05)
IMM GRANULOCYTES NFR BLD AUTO: 0.2 % (ref 0–0.5)
LYMPHOCYTES # BLD AUTO: 2.34 10*3/MM3 (ref 0.7–3.1)
LYMPHOCYTES NFR BLD AUTO: 35.8 % (ref 19.6–45.3)
MCH RBC QN AUTO: 24.6 PG (ref 26.6–33)
MCHC RBC AUTO-ENTMCNC: 27.3 G/DL (ref 31.5–35.7)
MCV RBC AUTO: 90 FL (ref 79–97)
MONOCYTES # BLD AUTO: 0.41 10*3/MM3 (ref 0.1–0.9)
MONOCYTES NFR BLD AUTO: 6.3 % (ref 5–12)
NEUTROPHILS # BLD AUTO: 3.29 10*3/MM3 (ref 1.7–7)
NEUTROPHILS NFR BLD AUTO: 50.2 % (ref 42.7–76)
NRBC BLD AUTO-RTO: 0 /100 WBC (ref 0–0.2)
PLAT MORPH BLD: NORMAL
PLATELET # BLD AUTO: 278 10*3/MM3 (ref 140–450)
PMV BLD AUTO: 10.2 FL (ref 6–12)
POTASSIUM BLD-SCNC: 5.7 MMOL/L (ref 3.5–5.2)
RBC # BLD AUTO: 3.21 10*6/MM3 (ref 3.77–5.28)
SODIUM BLD-SCNC: 130 MMOL/L (ref 136–145)
WBC NRBC COR # BLD: 6.54 10*3/MM3 (ref 3.4–10.8)

## 2020-06-11 PROCEDURE — 63710000001 INSULIN ASPART PER 5 UNITS: Performed by: INTERNAL MEDICINE

## 2020-06-11 PROCEDURE — 25010000002 ENOXAPARIN PER 10 MG: Performed by: INTERNAL MEDICINE

## 2020-06-11 PROCEDURE — 94799 UNLISTED PULMONARY SVC/PX: CPT

## 2020-06-11 PROCEDURE — 85007 BL SMEAR W/DIFF WBC COUNT: CPT | Performed by: INTERNAL MEDICINE

## 2020-06-11 PROCEDURE — 80048 BASIC METABOLIC PNL TOTAL CA: CPT | Performed by: INTERNAL MEDICINE

## 2020-06-11 PROCEDURE — 82962 GLUCOSE BLOOD TEST: CPT

## 2020-06-11 PROCEDURE — 99232 SBSQ HOSP IP/OBS MODERATE 35: CPT | Performed by: INTERNAL MEDICINE

## 2020-06-11 PROCEDURE — 25010000002 PROMETHAZINE PER 50 MG: Performed by: INTERNAL MEDICINE

## 2020-06-11 PROCEDURE — 63710000001 INSULIN DETEMIR PER 5 UNITS: Performed by: INTERNAL MEDICINE

## 2020-06-11 PROCEDURE — 85025 COMPLETE CBC W/AUTO DIFF WBC: CPT | Performed by: INTERNAL MEDICINE

## 2020-06-11 RX ORDER — ACETAMINOPHEN 325 MG/1
650 TABLET ORAL EVERY 6 HOURS PRN
Status: DISCONTINUED | OUTPATIENT
Start: 2020-06-11 | End: 2020-06-15 | Stop reason: HOSPADM

## 2020-06-11 RX ORDER — BUMETANIDE 0.25 MG/ML
1 INJECTION INTRAMUSCULAR; INTRAVENOUS ONCE
Status: COMPLETED | OUTPATIENT
Start: 2020-06-11 | End: 2020-06-11

## 2020-06-11 RX ADMIN — FOLIC ACID 1 MG: 1 TABLET ORAL at 07:56

## 2020-06-11 RX ADMIN — INSULIN ASPART 3 UNITS: 100 INJECTION, SOLUTION INTRAVENOUS; SUBCUTANEOUS at 12:05

## 2020-06-11 RX ADMIN — LACTULOSE 30 G: 10 SOLUTION ORAL at 15:15

## 2020-06-11 RX ADMIN — DOCUSATE SODIUM 50 MG AND SENNOSIDES 8.6 MG 1 TABLET: 8.6; 5 TABLET, FILM COATED ORAL at 20:10

## 2020-06-11 RX ADMIN — TIZANIDINE 4 MG: 4 TABLET ORAL at 09:45

## 2020-06-11 RX ADMIN — LACTULOSE 30 G: 10 SOLUTION ORAL at 20:09

## 2020-06-11 RX ADMIN — Medication 1 CAPSULE: at 07:56

## 2020-06-11 RX ADMIN — BACITRACIN: 500 OINTMENT TOPICAL at 20:09

## 2020-06-11 RX ADMIN — Medication 10 MG: at 20:10

## 2020-06-11 RX ADMIN — BUPRENORPHINE HYDROCHLORIDE AND NALOXONE HYDROCHLORIDE 1.25 TABLET: 8; 2 TABLET SUBLINGUAL at 09:45

## 2020-06-11 RX ADMIN — NYSTATIN: 100000 POWDER TOPICAL at 20:09

## 2020-06-11 RX ADMIN — BUMETANIDE 1 MG: 0.25 INJECTION, SOLUTION INTRAMUSCULAR; INTRAVENOUS at 07:56

## 2020-06-11 RX ADMIN — PROMETHAZINE HYDROCHLORIDE 12.5 MG: 25 INJECTION INTRAMUSCULAR; INTRAVENOUS at 20:29

## 2020-06-11 RX ADMIN — INSULIN DETEMIR 20 UNITS: 100 INJECTION, SOLUTION SUBCUTANEOUS at 20:08

## 2020-06-11 RX ADMIN — DOCUSATE SODIUM 100 MG: 100 CAPSULE, LIQUID FILLED ORAL at 07:56

## 2020-06-11 RX ADMIN — ENOXAPARIN SODIUM 40 MG: 40 INJECTION SUBCUTANEOUS at 07:54

## 2020-06-11 RX ADMIN — LACTULOSE 30 G: 10 SOLUTION ORAL at 07:55

## 2020-06-11 RX ADMIN — INSULIN ASPART 3 UNITS: 100 INJECTION, SOLUTION INTRAVENOUS; SUBCUTANEOUS at 16:54

## 2020-06-11 RX ADMIN — INSULIN ASPART 2 UNITS: 100 INJECTION, SOLUTION INTRAVENOUS; SUBCUTANEOUS at 07:57

## 2020-06-11 RX ADMIN — DOCUSATE SODIUM 50 MG AND SENNOSIDES 8.6 MG 1 TABLET: 8.6; 5 TABLET, FILM COATED ORAL at 08:00

## 2020-06-11 RX ADMIN — FLUDROCORTISONE ACETATE 100 MCG: 0.1 TABLET ORAL at 07:55

## 2020-06-11 RX ADMIN — SODIUM CHLORIDE, PRESERVATIVE FREE 10 ML: 5 INJECTION INTRAVENOUS at 20:29

## 2020-06-11 RX ADMIN — PROMETHAZINE HYDROCHLORIDE 12.5 MG: 25 INJECTION INTRAMUSCULAR; INTRAVENOUS at 00:41

## 2020-06-11 RX ADMIN — NYSTATIN: 100000 POWDER TOPICAL at 07:54

## 2020-06-11 RX ADMIN — CARVEDILOL 6.25 MG: 6.25 TABLET, FILM COATED ORAL at 16:55

## 2020-06-11 RX ADMIN — PROMETHAZINE HYDROCHLORIDE 12.5 MG: 25 INJECTION INTRAMUSCULAR; INTRAVENOUS at 14:10

## 2020-06-11 RX ADMIN — BACITRACIN: 500 OINTMENT TOPICAL at 07:57

## 2020-06-11 RX ADMIN — CARVEDILOL 6.25 MG: 6.25 TABLET, FILM COATED ORAL at 07:55

## 2020-06-11 RX ADMIN — TIZANIDINE 4 MG: 4 TABLET ORAL at 18:24

## 2020-06-11 RX ADMIN — ROPINIROLE HYDROCHLORIDE 0.25 MG: 0.25 TABLET, FILM COATED ORAL at 20:09

## 2020-06-11 RX ADMIN — CLONIDINE HYDROCHLORIDE 0.2 MG: 0.2 TABLET ORAL at 20:09

## 2020-06-11 RX ADMIN — PROMETHAZINE HYDROCHLORIDE 12.5 MG: 25 INJECTION INTRAMUSCULAR; INTRAVENOUS at 07:54

## 2020-06-11 NOTE — PROGRESS NOTES
Nephrology Progress Note      Subjective     Patient feels fine, no complaints.     Objective       Vital signs :     Temp:  [98 °F (36.7 °C)-98.4 °F (36.9 °C)] 98.4 °F (36.9 °C)  Heart Rate:  [83-88] 85  Resp:  [18] 18  BP: (115-146)/(77-86) 146/86      Intake/Output Summary (Last 24 hours) at 6/11/2020 0744  Last data filed at 6/11/2020 0600  Gross per 24 hour   Intake 2121.01 ml   Output 300 ml   Net 1821.01 ml       Physical Exam:    General Appearance : not in acute distress  Lungs : clear to auscultation, respirations regular  Heart :  regular rhythm & normal rate, normal S1, S2 and no murmur, no rub  Abdomen : normal bowel sounds, no masses, no hepatomegaly, no splenomegaly, soft non-tender and no guarding  Extremities : moves extremities well, no edema, no cyanosis and no redness  Skin :  no bleeding, bruising or rash  Neurologic :   orientated to person, place, time and situation, Grossly no focal deficits      Laboratory Data :     Albumin Albumin   Date Value Ref Range Status   06/09/2020 2.49 (L) 3.50 - 5.20 g/dL Final      Magnesium No results found for: MG       PTH               No results found for: PTH    CBC and coagulation:  Results from last 7 days   Lab Units 06/11/20  0105 06/10/20  0337 06/09/20  0353   WBC 10*3/mm3 6.54 7.70 6.59   HEMOGLOBIN g/dL 7.9* 7.3* 7.3*   HEMATOCRIT % 28.9* 26.8* 26.7*   MCV fL 90.0 88.4 89.0   MCHC g/dL 27.3* 27.2* 27.3*   PLATELETS 10*3/mm3 278 297 304     Acid/base balance:      Renal and electrolytes:  Results from last 7 days   Lab Units 06/11/20  0105 06/10/20  0337 06/09/20  0353 06/08/20  0519 06/07/20  0750   SODIUM mmol/L 130* 130* 129* 129* 130*   POTASSIUM mmol/L 5.7* 5.6* 5.1 5.0 5.3*   CHLORIDE mmol/L 99 98 97* 96* 100   CO2 mmol/L 22.1 25.0 23.7 27.1 23.7   BUN mg/dL 44* 39* 34* 34* 34*   CREATININE mg/dL 1.44* 1.33* 1.24* 1.22* 1.27*   EGFR IF NONAFRICN AM mL/min/1.73 40* 44* 48* 49* 46*   CALCIUM mg/dL 8.8 8.5* 8.5* 8.4* 8.5*     Estimated Creatinine  Clearance: 54.1 mL/min (A) (by C-G formula based on SCr of 1.44 mg/dL (H)).    Liver and pancreatic function:  Results from last 7 days   Lab Units 06/09/20  0353 06/07/20  0441 06/05/20  1022   ALBUMIN g/dL 2.49* 2.3* 2.5*   BILIRUBIN mg/dL <0.2*  --   --    ALK PHOS U/L 163*  --   --    AST (SGOT) U/L 21  --   --    ALT (SGPT) U/L 17  --   --          Cardiac:  Results from last 7 days   Lab Units 06/10/20  0337   PROBNP pg/mL 1,680.0*     Liver and pancreatic function:  Results from last 7 days   Lab Units 06/09/20  0353 06/07/20 0441 06/05/20  1022   ALBUMIN g/dL 2.49* 2.3* 2.5*   BILIRUBIN mg/dL <0.2*  --   --    ALK PHOS U/L 163*  --   --    AST (SGOT) U/L 21  --   --    ALT (SGPT) U/L 17  --   --        Medications :       bacitracin  Topical Q12H   buprenorphine-naloxone 1.25 tablet Sublingual Daily   carvedilol 6.25 mg Oral BID With Meals   cholecalciferol 50,000 Units Oral Q7 Days   cloNIDine 0.2 mg Oral Q12H   docusate sodium 100 mg Oral BID   enoxaparin 40 mg Subcutaneous Q24H   fludrocortisone 100 mcg Oral Daily   folic acid 1 mg Oral Daily   insulin aspart 0-7 Units Subcutaneous TID AC   insulin detemir 20 Units Subcutaneous Nightly   lactobacillus acidophilus 1 capsule Oral Daily   lactulose 30 g Oral TID   lactulose 40 g Oral Once   melatonin 10 mg Oral Nightly   metOLazone 5 mg Oral Once   nystatin  Topical Q12H   polyethylene glycol 17 g Oral Daily   rOPINIRole 0.25 mg Oral Nightly   senna-docusate sodium 1 tablet Oral BID   sodium chloride 10 mL Intravenous Q12H   sodium chloride 10 mL Intravenous Q12H   sodium chloride 10 mL Intravenous Q12H            Assessment/Plan     1. Persistent Hyperkalemia  2. Sever constipation  3. SARINA on CKD  4. DM-II  5. Essential hypertension     Resistent hyperkalemia, despite adequate bowel evacuation. CXR and bnp suggestive of fluid overload, will give bumex and increase fludrocortisone to 100mcg.       Hyperkalemia likely 2/2 sever constipation, less likely from  RTA.   UAG 33, suggestive of RTA, likely type IV with hyporeninemic hypoaldosteronism  Baseline Cr <1, admitted with 1.3, 2.9 G proteinuria, likely diabetic nephrosclerosis  Serology is negative  -cotinue on docusate, senna  -increase fludrocortisone 100mcg daily  -Bumex 1mg IV once      Jonnathan Kinney MD  06/11/20  07:44

## 2020-06-11 NOTE — PLAN OF CARE
Patient continues to complain of intermittent nausea, PRN medications helpful.  Refused miralax today but took all other medications throughout the shift.  No complaints at this time will continue with current plan of care.

## 2020-06-11 NOTE — PROGRESS NOTES
Subjective     History:   Angelique Mcdermott is a 39 y.o. female admitted on 5/25/2020 secondary to Olecranon bursa abscess, left     Procedures: None    CC: Follow up hyperkalemia     Patient seen and examined with MCKENNA Salgado. Awake and alert. States she does not feel well today with nausea and headache. Reports some increased dyspnea as well. No reported CP. RN reports she has been intermittently refusing bowel regimen again. No acute events overnight per RN.     History taken from: patient, chart, and RN.      Objective     Vital Signs  Temp:  [97.3 °F (36.3 °C)-98.4 °F (36.9 °C)] 97.7 °F (36.5 °C)  Heart Rate:  [82-88] 88  Resp:  [18] 18  BP: (129-151)/(76-97) 143/97    Intake/Output Summary (Last 24 hours) at 6/11/2020 1540  Last data filed at 6/11/2020 1200  Gross per 24 hour   Intake 1885.01 ml   Output 300 ml   Net 1585.01 ml         Physical Exam: Unchanged from previous   General:    Awake, alert, in no acute distress, chronically ill appearing   Heart:      Normal S1 and S2. Regular rate and rhythm. No significant murmur, rubs or gallops appreciated.   Lungs:     Respirations regular, even and unlabored. Lungs clear to auscultation B/L. No wheezes, rales or rhonchi.   Abdomen:   Soft. Nontender. No guarding, rebound tenderness or organomegaly noted. Bowel sounds present x 4.   Extremities:  No clubbing, cyanosis or edema noted in RLE. RLE wounds bandaged. (+) LLE AKA. Left elbow swelling much improved with no surrounding erythema.       Results Review:    Results from last 7 days   Lab Units 06/11/20  0105 06/10/20  0337 06/09/20  0353 06/08/20  0519 06/07/20  0750 06/05/20  0324   WBC 10*3/mm3 6.54 7.70 6.59 7.17 7.65 6.91   HEMOGLOBIN g/dL 7.9* 7.3* 7.3* 7.1* 7.1* 6.9*   PLATELETS 10*3/mm3 278 297 304 301 293 285     Results from last 7 days   Lab Units 06/11/20  0105 06/10/20  0337 06/09/20  0353 06/08/20  0519 06/07/20  0750 06/06/20  1705 06/06/20  1447 06/06/20  0812 06/05/20  0324   SODIUM mmol/L  130* 130* 129* 129* 130*  --   --  128* 132*   POTASSIUM mmol/L 5.7* 5.6* 5.1 5.0 5.3* 5.4* 5.8* 6.0* 5.6*   CHLORIDE mmol/L 99 98 97* 96* 100  --   --  101 102   CO2 mmol/L 22.1 25.0 23.7 27.1 23.7  --   --  19.4* 22.3   BUN mg/dL 44* 39* 34* 34* 34*  --   --  37* 34*   CREATININE mg/dL 1.44* 1.33* 1.24* 1.22* 1.27*  --   --  1.28* 1.28*   CALCIUM mg/dL 8.8 8.5* 8.5* 8.4* 8.5*  --   --  8.4* 8.1*   GLUCOSE mg/dL 244* 264* 238* 326* 256*  --   --  227* 337*     Results from last 7 days   Lab Units 06/09/20  0353   BILIRUBIN mg/dL <0.2*   ALK PHOS U/L 163*   AST (SGOT) U/L 21   ALT (SGPT) U/L 17                   Imaging Results (Last 24 Hours)     ** No results found for the last 24 hours. **            Medications:    bacitracin  Topical Q12H   buprenorphine-naloxone 1.25 tablet Sublingual Daily   carvedilol 6.25 mg Oral BID With Meals   cholecalciferol 50,000 Units Oral Q7 Days   cloNIDine 0.2 mg Oral Q12H   docusate sodium 100 mg Oral BID   enoxaparin 40 mg Subcutaneous Q24H   fludrocortisone 100 mcg Oral Daily   folic acid 1 mg Oral Daily   insulin aspart 0-7 Units Subcutaneous TID AC   insulin detemir 20 Units Subcutaneous Nightly   lactobacillus acidophilus 1 capsule Oral Daily   lactulose 30 g Oral TID   lactulose 40 g Oral Once   melatonin 10 mg Oral Nightly   metOLazone 5 mg Oral Once   nystatin  Topical Q12H   polyethylene glycol 17 g Oral Daily   rOPINIRole 0.25 mg Oral Nightly   senna-docusate sodium 1 tablet Oral BID   sodium chloride 10 mL Intravenous Q12H   sodium chloride 10 mL Intravenous Q12H   sodium chloride 10 mL Intravenous Q12H              Assessment/Plan   UTI: Urine culture revealing mixed jefferson. Completed course of Rocephin.         Nausea, vomiting and abdominal pain: CT abd/pelvis revealed no acute abnormalities with extensive soft tissue edema throughout the body wall and mesentary with no ascites, hepatosplenomegaly, cholecystectomy, hysterectomy, likely prior appendectomy, RLQ hernia  repair and nonobstructing right renal calculi. Gastric emptying study is normal. Symptoms overall improved. Cont treatment of constipation.     DM II, insulin dependent with hyperglycemia: HgbA1c is 11.1. Pt admits to noncompliance with diabetic diet at home. Diabetes educator consulted. Basal insulin stopped as her BG decreased but it has now trended back upward. Improved today with recent insulin adjustments and will cont current basal insulin today. Cont SSI with Accuchecks.      Essential HTN: BP elevated upon admission but much improved and now stable and controlled. Cont home Coreg and clonidine. Holding home lisinopril in the setting of hyperkalemia. Cont PRN IV hydralazine.      SARINA vs CKD III with proteinuria: Possibly 2/2 above. No hydronephrosis on CT. Holding home lisinopril. Creatine has risen slightly but remains overall stable today. Repeat labs in the AM.     Hyperkalemia: Initially suspected to be 2/2 severe constipation. 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 Improved with fludrocortisone. K+ remains elevated today. Pt has been noncompliant with bowel regimen. Discussed importance of bowel regimen again today. Nephrology has increased fludrocortisone. Bumex ordered. Nephrology input appreciated.       Anion gap metabolic acidosis: Possibly 2/2 above. Now resolved.       Mild troponin elevation with hx of CAD: No acute ischemic changes on EKG. Normalized on repeat. Echo reveals an EF of 56-60%, grade I diastolic dysfunction and mild TR. Pt denies CP. Monitor on telemetry.       Hyponatremia: Stable today. Bumex ordered per nephrology. Cont to monitor.      Normocytic anemia: Iron deficient and nephrology ordered a dose of Venofer. Folate and Vit D are low and supplementation has been started. Stable today. Repeat CBC in the AM.      Substance abuse: UDS positive for amphetamines and buprenorphine. Pt is prescribed Suboxone and admits to recent methamphetamine abuse.  Denies recent IVDA. Declines any information regarding substance abuse programs.      Cirrhosis with hx of Hep B and C: Viral hepatitis panel reveals Hep C Ab reactivity. Will need outpatient follow up with GI.     Left elbow effusion with concern for bursitis: No acute findings on xray. Records from [facility] reveal MRSA on cultures. Surgical intervention planned but pt refused, stating she did not want to be NPO for an extended period of time. She later became agreeable to surgery but she clinically improved with IV antibiotics and ortho did not think she needed surgery. Course of Vanc and Rocephin now completed. Ortho has signed off with input appreciated.     Severe constipation: Cont aggressive bowel regimen.     Hypomagnesemia: Improved with supplementation.      DVT PPX: SQ Lovenox          Jeremiah Medina DO  06/11/20  15:40

## 2020-06-11 NOTE — PLAN OF CARE
Pt continues to complain of nausea w/ no reports of vomiting. Prn medication administered. Pt also continues to refuse wedge use and turns. Educated pt on importance of wedge use and turns to avoid skin breakdown. Pt states she self turns. Pt was compliant w/ bowel regiment medications this shift, however, no bm noted thus far.

## 2020-06-12 LAB
ANION GAP SERPL CALCULATED.3IONS-SCNC: 8.5 MMOL/L (ref 5–15)
ANISOCYTOSIS BLD QL: NORMAL
BASOPHILS # BLD AUTO: 0.07 10*3/MM3 (ref 0–0.2)
BASOPHILS NFR BLD AUTO: 1.1 % (ref 0–1.5)
BUN BLD-MCNC: 45 MG/DL (ref 6–20)
BUN/CREAT SERPL: 34.6 (ref 7–25)
CALCIUM SPEC-SCNC: 9 MG/DL (ref 8.6–10.5)
CHLORIDE SERPL-SCNC: 100 MMOL/L (ref 98–107)
CO2 SERPL-SCNC: 24.5 MMOL/L (ref 22–29)
CREAT BLD-MCNC: 1.3 MG/DL (ref 0.57–1)
DEPRECATED RDW RBC AUTO: 58.4 FL (ref 37–54)
EOSINOPHIL # BLD AUTO: 0.43 10*3/MM3 (ref 0–0.4)
EOSINOPHIL NFR BLD AUTO: 6.5 % (ref 0.3–6.2)
ERYTHROCYTE [DISTWIDTH] IN BLOOD BY AUTOMATED COUNT: 18.6 % (ref 12.3–15.4)
GFR SERPL CREATININE-BSD FRML MDRD: 45 ML/MIN/1.73
GLUCOSE BLD-MCNC: 153 MG/DL (ref 65–99)
GLUCOSE BLDC GLUCOMTR-MCNC: 150 MG/DL (ref 70–130)
GLUCOSE BLDC GLUCOMTR-MCNC: 150 MG/DL (ref 70–130)
GLUCOSE BLDC GLUCOMTR-MCNC: 262 MG/DL (ref 70–130)
GLUCOSE BLDC GLUCOMTR-MCNC: 304 MG/DL (ref 70–130)
HCT VFR BLD AUTO: 28.3 % (ref 34–46.6)
HGB BLD-MCNC: 8 G/DL (ref 12–15.9)
HYPOCHROMIA BLD QL: NORMAL
IMM GRANULOCYTES # BLD AUTO: 0.02 10*3/MM3 (ref 0–0.05)
IMM GRANULOCYTES NFR BLD AUTO: 0.3 % (ref 0–0.5)
LYMPHOCYTES # BLD AUTO: 2.34 10*3/MM3 (ref 0.7–3.1)
LYMPHOCYTES NFR BLD AUTO: 35.4 % (ref 19.6–45.3)
MCH RBC QN AUTO: 24.8 PG (ref 26.6–33)
MCHC RBC AUTO-ENTMCNC: 28.3 G/DL (ref 31.5–35.7)
MCV RBC AUTO: 87.6 FL (ref 79–97)
MONOCYTES # BLD AUTO: 0.49 10*3/MM3 (ref 0.1–0.9)
MONOCYTES NFR BLD AUTO: 7.4 % (ref 5–12)
NEUTROPHILS # BLD AUTO: 3.26 10*3/MM3 (ref 1.7–7)
NEUTROPHILS NFR BLD AUTO: 49.3 % (ref 42.7–76)
NRBC BLD AUTO-RTO: 0 /100 WBC (ref 0–0.2)
PLATELET # BLD AUTO: 275 10*3/MM3 (ref 140–450)
PMV BLD AUTO: 10.1 FL (ref 6–12)
POTASSIUM BLD-SCNC: 5.7 MMOL/L (ref 3.5–5.2)
RBC # BLD AUTO: 3.23 10*6/MM3 (ref 3.77–5.28)
SMALL PLATELETS BLD QL SMEAR: ADEQUATE
SODIUM BLD-SCNC: 133 MMOL/L (ref 136–145)
WBC NRBC COR # BLD: 6.61 10*3/MM3 (ref 3.4–10.8)

## 2020-06-12 PROCEDURE — 63710000001 INSULIN DETEMIR PER 5 UNITS: Performed by: INTERNAL MEDICINE

## 2020-06-12 PROCEDURE — 82595 ASSAY OF CRYOGLOBULIN: CPT | Performed by: INTERNAL MEDICINE

## 2020-06-12 PROCEDURE — 63710000001 INSULIN ASPART PER 5 UNITS: Performed by: INTERNAL MEDICINE

## 2020-06-12 PROCEDURE — 80048 BASIC METABOLIC PNL TOTAL CA: CPT | Performed by: INTERNAL MEDICINE

## 2020-06-12 PROCEDURE — 87522 HEPATITIS C REVRS TRNSCRPJ: CPT | Performed by: INTERNAL MEDICINE

## 2020-06-12 PROCEDURE — 82962 GLUCOSE BLOOD TEST: CPT

## 2020-06-12 PROCEDURE — 63710000001 PROMETHAZINE PER 12.5 MG: Performed by: INTERNAL MEDICINE

## 2020-06-12 PROCEDURE — 94799 UNLISTED PULMONARY SVC/PX: CPT

## 2020-06-12 PROCEDURE — 85007 BL SMEAR W/DIFF WBC COUNT: CPT | Performed by: INTERNAL MEDICINE

## 2020-06-12 PROCEDURE — 85025 COMPLETE CBC W/AUTO DIFF WBC: CPT | Performed by: INTERNAL MEDICINE

## 2020-06-12 PROCEDURE — 25010000002 PROMETHAZINE PER 50 MG: Performed by: INTERNAL MEDICINE

## 2020-06-12 PROCEDURE — 25010000002 ENOXAPARIN PER 10 MG: Performed by: INTERNAL MEDICINE

## 2020-06-12 PROCEDURE — 25010000002 ONDANSETRON PER 1 MG: Performed by: INTERNAL MEDICINE

## 2020-06-12 PROCEDURE — 99232 SBSQ HOSP IP/OBS MODERATE 35: CPT | Performed by: INTERNAL MEDICINE

## 2020-06-12 RX ORDER — BUMETANIDE 0.25 MG/ML
1.5 INJECTION INTRAMUSCULAR; INTRAVENOUS ONCE
Status: COMPLETED | OUTPATIENT
Start: 2020-06-12 | End: 2020-06-12

## 2020-06-12 RX ADMIN — TIZANIDINE 4 MG: 4 TABLET ORAL at 15:34

## 2020-06-12 RX ADMIN — DOCUSATE SODIUM 100 MG: 100 CAPSULE, LIQUID FILLED ORAL at 08:59

## 2020-06-12 RX ADMIN — Medication 1 CAPSULE: at 09:00

## 2020-06-12 RX ADMIN — CLONIDINE HYDROCHLORIDE 0.2 MG: 0.2 TABLET ORAL at 21:47

## 2020-06-12 RX ADMIN — INSULIN ASPART 2 UNITS: 100 INJECTION, SOLUTION INTRAVENOUS; SUBCUTANEOUS at 09:09

## 2020-06-12 RX ADMIN — CARVEDILOL 6.25 MG: 6.25 TABLET, FILM COATED ORAL at 16:42

## 2020-06-12 RX ADMIN — PROMETHAZINE HYDROCHLORIDE 12.5 MG: 12.5 TABLET ORAL at 16:42

## 2020-06-12 RX ADMIN — INSULIN ASPART 2 UNITS: 100 INJECTION, SOLUTION INTRAVENOUS; SUBCUTANEOUS at 17:31

## 2020-06-12 RX ADMIN — CARVEDILOL 6.25 MG: 6.25 TABLET, FILM COATED ORAL at 09:00

## 2020-06-12 RX ADMIN — NYSTATIN: 100000 POWDER TOPICAL at 09:00

## 2020-06-12 RX ADMIN — LACTULOSE 30 G: 10 SOLUTION ORAL at 16:42

## 2020-06-12 RX ADMIN — CLONIDINE HYDROCHLORIDE 0.2 MG: 0.2 TABLET ORAL at 09:00

## 2020-06-12 RX ADMIN — DOCUSATE SODIUM 50 MG AND SENNOSIDES 8.6 MG 1 TABLET: 8.6; 5 TABLET, FILM COATED ORAL at 21:47

## 2020-06-12 RX ADMIN — BACITRACIN: 500 OINTMENT TOPICAL at 21:53

## 2020-06-12 RX ADMIN — FLUDROCORTISONE ACETATE 100 MCG: 0.1 TABLET ORAL at 09:00

## 2020-06-12 RX ADMIN — ONDANSETRON 4 MG: 2 INJECTION INTRAMUSCULAR; INTRAVENOUS at 02:54

## 2020-06-12 RX ADMIN — NYSTATIN: 100000 POWDER TOPICAL at 21:53

## 2020-06-12 RX ADMIN — ROPINIROLE HYDROCHLORIDE 0.25 MG: 0.25 TABLET, FILM COATED ORAL at 21:47

## 2020-06-12 RX ADMIN — INSULIN ASPART 5 UNITS: 100 INJECTION, SOLUTION INTRAVENOUS; SUBCUTANEOUS at 12:00

## 2020-06-12 RX ADMIN — LACTULOSE 30 G: 10 SOLUTION ORAL at 08:59

## 2020-06-12 RX ADMIN — FOLIC ACID 1 MG: 1 TABLET ORAL at 09:00

## 2020-06-12 RX ADMIN — PROMETHAZINE HYDROCHLORIDE 12.5 MG: 25 INJECTION INTRAMUSCULAR; INTRAVENOUS at 08:58

## 2020-06-12 RX ADMIN — BACITRACIN: 500 OINTMENT TOPICAL at 09:01

## 2020-06-12 RX ADMIN — LACTULOSE 30 G: 10 SOLUTION ORAL at 21:47

## 2020-06-12 RX ADMIN — SODIUM CHLORIDE, PRESERVATIVE FREE 10 ML: 5 INJECTION INTRAVENOUS at 21:54

## 2020-06-12 RX ADMIN — BUMETANIDE 1.5 MG: 0.25 INJECTION, SOLUTION INTRAMUSCULAR; INTRAVENOUS at 08:59

## 2020-06-12 RX ADMIN — INSULIN DETEMIR 20 UNITS: 100 INJECTION, SOLUTION SUBCUTANEOUS at 21:47

## 2020-06-12 RX ADMIN — ONDANSETRON 4 MG: 2 INJECTION INTRAMUSCULAR; INTRAVENOUS at 22:21

## 2020-06-12 RX ADMIN — DOCUSATE SODIUM 50 MG AND SENNOSIDES 8.6 MG 1 TABLET: 8.6; 5 TABLET, FILM COATED ORAL at 08:59

## 2020-06-12 RX ADMIN — ENOXAPARIN SODIUM 40 MG: 40 INJECTION SUBCUTANEOUS at 09:00

## 2020-06-12 RX ADMIN — Medication 10 MG: at 21:47

## 2020-06-12 NOTE — PLAN OF CARE
Patient stated she was very tired today, she has slept off and on throughout the shift.  She did decline to work with therapy today, prn nausea medication switched to PO. Instructed dietary if the patient requests foods outside of her diet to inform her they cannot do that and contact nursing staff.  Patient currently has no complaints, will continue with current plan of care.

## 2020-06-12 NOTE — PROGRESS NOTES
Subjective     History:   Angelique Mcdermott is a 39 y.o. female admitted on 5/25/2020 secondary to Olecranon bursa abscess, left     Procedures: None    CC: Follow up hyperkalemia     Patient seen and examined with MCKENNA Serrano. Awake and alert. States she does not feel well again today with HA and nausea. Tolerating PO intake with no reported vomiting. Reports some dyspnea. No reported CP. RN reports large BM this AM. No acute events overnight per RN.     History taken from: patient, chart, and RN.      Objective     Vital Signs  Temp:  [97.2 °F (36.2 °C)-98.4 °F (36.9 °C)] 98.4 °F (36.9 °C)  Heart Rate:  [82-90] 82  Resp:  [18] 18  BP: (138-145)/(84-96) 142/94    Intake/Output Summary (Last 24 hours) at 6/12/2020 1653  Last data filed at 6/12/2020 1500  Gross per 24 hour   Intake 1315.79 ml   Output --   Net 1315.79 ml         Physical Exam: Unchanged from previous   General:    Awake, alert, in no acute distress, chronically ill appearing   Heart:      Normal S1 and S2. Regular rate and rhythm. No significant murmur, rubs or gallops appreciated.   Lungs:     Respirations regular, even and unlabored. Lungs clear to auscultation B/L. No wheezes, rales or rhonchi.   Abdomen:   Soft. Nontender. No guarding, rebound tenderness or organomegaly noted. Bowel sounds present x 4.   Extremities:  No clubbing, cyanosis or edema noted in RLE. RLE wounds bandaged. (+) LLE AKA. Left elbow swelling much improved with no surrounding erythema.       Results Review:    Results from last 7 days   Lab Units 06/12/20 0447 06/11/20  0105 06/10/20  0337 06/09/20  0353 06/08/20  0519 06/07/20  0750   WBC 10*3/mm3 6.61 6.54 7.70 6.59 7.17 7.65   HEMOGLOBIN g/dL 8.0* 7.9* 7.3* 7.3* 7.1* 7.1*   PLATELETS 10*3/mm3 275 278 297 304 301 293     Results from last 7 days   Lab Units 06/12/20 0447 06/11/20  0105 06/10/20  0337 06/09/20  0353 06/08/20  0519 06/07/20  0750 06/06/20  1705  06/06/20  0812   SODIUM mmol/L 133* 130* 130* 129* 129* 130*  --    --  128*   POTASSIUM mmol/L 5.7* 5.7* 5.6* 5.1 5.0 5.3* 5.4*   < > 6.0*   CHLORIDE mmol/L 100 99 98 97* 96* 100  --   --  101   CO2 mmol/L 24.5 22.1 25.0 23.7 27.1 23.7  --   --  19.4*   BUN mg/dL 45* 44* 39* 34* 34* 34*  --   --  37*   CREATININE mg/dL 1.30* 1.44* 1.33* 1.24* 1.22* 1.27*  --   --  1.28*   CALCIUM mg/dL 9.0 8.8 8.5* 8.5* 8.4* 8.5*  --   --  8.4*   GLUCOSE mg/dL 153* 244* 264* 238* 326* 256*  --   --  227*    < > = values in this interval not displayed.     Results from last 7 days   Lab Units 06/09/20  0353   BILIRUBIN mg/dL <0.2*   ALK PHOS U/L 163*   AST (SGOT) U/L 21   ALT (SGPT) U/L 17                   Imaging Results (Last 24 Hours)     ** No results found for the last 24 hours. **            Medications:    bacitracin  Topical Q12H   buprenorphine-naloxone 1.25 tablet Sublingual Daily   carvedilol 6.25 mg Oral BID With Meals   cholecalciferol 50,000 Units Oral Q7 Days   cloNIDine 0.2 mg Oral Q12H   docusate sodium 100 mg Oral BID   enoxaparin 40 mg Subcutaneous Q24H   fludrocortisone 100 mcg Oral Daily   folic acid 1 mg Oral Daily   insulin aspart 0-7 Units Subcutaneous TID AC   insulin detemir 20 Units Subcutaneous Nightly   lactobacillus acidophilus 1 capsule Oral Daily   lactulose 30 g Oral TID   lactulose 40 g Oral Once   melatonin 10 mg Oral Nightly   metOLazone 5 mg Oral Once   nystatin  Topical Q12H   polyethylene glycol 17 g Oral Daily   rOPINIRole 0.25 mg Oral Nightly   senna-docusate sodium 1 tablet Oral BID   sodium chloride 10 mL Intravenous Q12H   sodium chloride 10 mL Intravenous Q12H   sodium chloride 10 mL Intravenous Q12H              Assessment/Plan   UTI: Urine culture finalized with mixed jefferson. Completed course of Rocephin.         Nausea, vomiting and abdominal pain: CT abd/pelvis revealed no acute abnormalities with extensive soft tissue edema throughout the body wall and mesentary with no ascites, hepatosplenomegaly, cholecystectomy, hysterectomy, likely prior  appendectomy, RLQ hernia repair and nonobstructing right renal calculi. Gastric emptying study is normal. Symptoms overall improved. Cont treatment of constipation.     DM II, insulin dependent with hyperglycemia: HgbA1c is 11.1. Pt admits to noncompliance with diabetic diet at home. Diabetes educator consulted. Basal insulin stopped as her BG decreased but later trended back upward. Improved over the last few days with recent insulin adjustments and will cont current basal insulin today. Cont SSI with Accuchecks.      Essential HTN: BP elevated upon admission but much improved and now stable and controlled. Cont home Coreg and clonidine. Holding home lisinopril in the setting of hyperkalemia. Cont PRN IV hydralazine.      SARINA vs CKD III with proteinuria: Possibly 2/2 above. No hydronephrosis on CT. Holding home lisinopril. Creatine improved today. Repeat labs in the AM.     Hyperkalemia: Initially suspected to be 2/2 severe constipation. 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 Improved with fludrocortisone. K+ remains elevated today. Pt has been intermittently noncompliant with bowel regimen.  Nephrology has increased fludrocortisone. Bumex ordered. Cont to encourage compliance with bowel regimen. Nephrology has requested that preauth for Patromir be attempted again. Nephrology input appreciated.       Anion gap metabolic acidosis: Possibly 2/2 above. Now resolved.       Mild troponin elevation with hx of CAD: No acute ischemic changes on EKG. Normalized on repeat. Echo reveals an EF of 56-60%, grade I diastolic dysfunction and mild TR. Pt denies CP. Monitor on telemetry.       Hyponatremia: Stable today. Bumex ordered per nephrology. Cont to monitor.      Normocytic anemia: Iron deficient and nephrology ordered a dose of Venofer. Folate and Vit D are low and supplementation has been started. Stable today. Repeat CBC in the AM.      Substance abuse: UDS positive for amphetamines and  buprenorphine. Pt is prescribed Suboxone and admits to recent methamphetamine abuse. Denies recent IVDA. Declines any information regarding substance abuse programs.      Cirrhosis with hx of Hep B and C: Viral hepatitis panel reveals Hep C Ab reactivity. Will need outpatient follow up with GI.     Left elbow effusion with concern for bursitis: No acute findings on xray. Records from [facility] reveal MRSA on cultures. Surgical intervention planned but pt refused, stating she did not want to be NPO for an extended period of time. She later became agreeable to surgery but she clinically improved with IV antibiotics and ortho did not think she needed surgery. Course of Vanc and Rocephin now completed. Ortho has signed off with input appreciated.     Severe constipation: Cont aggressive bowel regimen.     Hypomagnesemia: Improved with supplementation.      DVT PPX: SQ Lovenox     Discussed with Dr. Kinney.         Jeremiah Medina DO  06/12/20  16:53

## 2020-06-12 NOTE — SIGNIFICANT NOTE
06/12/20 1606   Rehab Treatment   Discipline physical therapist   Reason Treatment Not Performed patient/family declined treatment

## 2020-06-12 NOTE — PROGRESS NOTES
Discharge Planning Assessment   Lucien     Patient Name: Angelique Mcdermott  MRN: [id number]  Today's Date: 6/12/2020    Admit Date: 5/25/2020      Discharge Plan     Row Name 06/12/20 1622       Plan    Plan  Pt admitted on 5/25/20.  Pt lives at home with friends and plans to return home at discharge.  Pt currently does not utilize home health services.  Pt currently utilizes w/c, bsc and hospital bed via unknown provider.  SS will follow.           NADINE Maldonado

## 2020-06-12 NOTE — PLAN OF CARE
Patient had been complaining of nausea, given meds as ordered, no other complaints, will continue to monitor.

## 2020-06-12 NOTE — PROGRESS NOTES
Nephrology Progress Note      Subjective     Patient feels fine, no complaints except her usual headach    Objective       Vital signs :     Temp:  [97.2 °F (36.2 °C)-98 °F (36.7 °C)] 97.2 °F (36.2 °C)  Heart Rate:  [84-90] 86  Resp:  [18] 18  BP: (132-145)/(76-97) 145/92      Intake/Output Summary (Last 24 hours) at 6/12/2020 0725  Last data filed at 6/12/2020 0600  Gross per 24 hour   Intake 1675.79 ml   Output --   Net 1675.79 ml       Physical Exam:    General Appearance : not in acute distress  Lungs : clear to auscultation, respirations regular  Heart :  regular rhythm & normal rate, normal S1, S2 and no murmur, no rub  Abdomen : normal bowel sounds, no masses, no hepatomegaly, no splenomegaly, soft non-tender and no guarding  Extremities : moves extremities well, no edema, no cyanosis and no redness  Skin :  no bleeding, bruising or rash  Neurologic :   orientated to person, place, time and situation, Grossly no focal deficits      Laboratory Data :     Albumin No results found for: ALBUMIN   Magnesium No results found for: MG       PTH               No results found for: PTH    CBC and coagulation:  Results from last 7 days   Lab Units 06/12/20  0447 06/11/20  0105 06/10/20  0337   WBC 10*3/mm3 6.61 6.54 7.70   HEMOGLOBIN g/dL 8.0* 7.9* 7.3*   HEMATOCRIT % 28.3* 28.9* 26.8*   MCV fL 87.6 90.0 88.4   MCHC g/dL 28.3* 27.3* 27.2*   PLATELETS 10*3/mm3 275 278 297     Acid/base balance:      Renal and electrolytes:  Results from last 7 days   Lab Units 06/12/20  0447 06/11/20  0105 06/10/20  0337 06/09/20  0353 06/08/20  0519   SODIUM mmol/L 133* 130* 130* 129* 129*   POTASSIUM mmol/L 5.7* 5.7* 5.6* 5.1 5.0   CHLORIDE mmol/L 100 99 98 97* 96*   CO2 mmol/L 24.5 22.1 25.0 23.7 27.1   BUN mg/dL 45* 44* 39* 34* 34*   CREATININE mg/dL 1.30* 1.44* 1.33* 1.24* 1.22*   EGFR IF NONAFRICN AM mL/min/1.73 45* 40* 44* 48* 49*   CALCIUM mg/dL 9.0 8.8 8.5* 8.5* 8.4*     Estimated Creatinine Clearance: 65.3 mL/min (A) (by C-G  formula based on SCr of 1.3 mg/dL (H)).    Liver and pancreatic function:  Results from last 7 days   Lab Units 06/09/20  0353 06/07/20  0441 06/05/20  1022   ALBUMIN g/dL 2.49* 2.3* 2.5*   BILIRUBIN mg/dL <0.2*  --   --    ALK PHOS U/L 163*  --   --    AST (SGOT) U/L 21  --   --    ALT (SGPT) U/L 17  --   --          Cardiac:  Results from last 7 days   Lab Units 06/10/20  0337   PROBNP pg/mL 1,680.0*     Liver and pancreatic function:  Results from last 7 days   Lab Units 06/09/20  0353 06/07/20  0441 06/05/20  1022   ALBUMIN g/dL 2.49* 2.3* 2.5*   BILIRUBIN mg/dL <0.2*  --   --    ALK PHOS U/L 163*  --   --    AST (SGOT) U/L 21  --   --    ALT (SGPT) U/L 17  --   --        Medications :       bacitracin  Topical Q12H   buprenorphine-naloxone 1.25 tablet Sublingual Daily   carvedilol 6.25 mg Oral BID With Meals   cholecalciferol 50,000 Units Oral Q7 Days   cloNIDine 0.2 mg Oral Q12H   docusate sodium 100 mg Oral BID   enoxaparin 40 mg Subcutaneous Q24H   fludrocortisone 100 mcg Oral Daily   folic acid 1 mg Oral Daily   insulin aspart 0-7 Units Subcutaneous TID AC   insulin detemir 20 Units Subcutaneous Nightly   lactobacillus acidophilus 1 capsule Oral Daily   lactulose 30 g Oral TID   lactulose 40 g Oral Once   melatonin 10 mg Oral Nightly   metOLazone 5 mg Oral Once   nystatin  Topical Q12H   polyethylene glycol 17 g Oral Daily   rOPINIRole 0.25 mg Oral Nightly   senna-docusate sodium 1 tablet Oral BID   sodium chloride 10 mL Intravenous Q12H   sodium chloride 10 mL Intravenous Q12H   sodium chloride 10 mL Intravenous Q12H            Assessment/Plan     1. Persistent Hyperkalemia  2. Sever constipation  3. SARINA on CKD  4. DM-II  5. Essential hypertension     K remains elevated, Cr is slightly better today. Serology positive for Hep C and evidence of past infection with Hep B. Will send cyroglobulins. Insurance has refused Patromir pre auth, will contact again as we have already tried Kapablolate and beyond this  she will have significant higher risk of gut necrosis.   Will continue IV diresis     Hyperkalemia likely 2/2 sever constipation, less likely from RTA.   UAG 33, suggestive of RTA, likely type IV with hyporeninemic hypoaldosteronism  Baseline Cr <1, admitted with 1.3, 2.9 G proteinuria, likely diabetic nephrosclerosis  Serology is negative  -cotinue on docusate, senna  - fludrocortisone 100mcg daily  -Bumex 1.5mg IV once      Jonnathan Kinney MD  06/12/20  07:25

## 2020-06-13 LAB
ALBUMIN SERPL-MCNC: 2.45 G/DL (ref 3.5–5.2)
ALBUMIN/GLOB SERPL: 0.4 G/DL
ALP SERPL-CCNC: 171 U/L (ref 39–117)
ALT SERPL W P-5'-P-CCNC: 29 U/L (ref 1–33)
ANION GAP SERPL CALCULATED.3IONS-SCNC: 7.6 MMOL/L (ref 5–15)
ANISOCYTOSIS BLD QL: NORMAL
AST SERPL-CCNC: 41 U/L (ref 1–32)
BASOPHILS # BLD AUTO: 0.08 10*3/MM3 (ref 0–0.2)
BASOPHILS NFR BLD AUTO: 1.3 % (ref 0–1.5)
BILIRUB SERPL-MCNC: <0.2 MG/DL (ref 0.2–1.2)
BUN BLD-MCNC: 41 MG/DL (ref 6–20)
BUN/CREAT SERPL: 33.9 (ref 7–25)
CALCIUM SPEC-SCNC: 8.8 MG/DL (ref 8.6–10.5)
CHLORIDE SERPL-SCNC: 99 MMOL/L (ref 98–107)
CO2 SERPL-SCNC: 25.4 MMOL/L (ref 22–29)
CREAT BLD-MCNC: 1.21 MG/DL (ref 0.57–1)
DEPRECATED RDW RBC AUTO: 59.5 FL (ref 37–54)
EOSINOPHIL # BLD AUTO: 0.34 10*3/MM3 (ref 0–0.4)
EOSINOPHIL NFR BLD AUTO: 5.7 % (ref 0.3–6.2)
ERYTHROCYTE [DISTWIDTH] IN BLOOD BY AUTOMATED COUNT: 18.7 % (ref 12.3–15.4)
GFR SERPL CREATININE-BSD FRML MDRD: 49 ML/MIN/1.73
GLOBULIN UR ELPH-MCNC: 5.6 GM/DL
GLUCOSE BLD-MCNC: 232 MG/DL (ref 65–99)
GLUCOSE BLDC GLUCOMTR-MCNC: 145 MG/DL (ref 70–130)
GLUCOSE BLDC GLUCOMTR-MCNC: 240 MG/DL (ref 70–130)
GLUCOSE BLDC GLUCOMTR-MCNC: 283 MG/DL (ref 70–130)
GLUCOSE BLDC GLUCOMTR-MCNC: 291 MG/DL (ref 70–130)
HCT VFR BLD AUTO: 27 % (ref 34–46.6)
HGB BLD-MCNC: 7.7 G/DL (ref 12–15.9)
HYPOCHROMIA BLD QL: NORMAL
IMM GRANULOCYTES # BLD AUTO: 0.01 10*3/MM3 (ref 0–0.05)
IMM GRANULOCYTES NFR BLD AUTO: 0.2 % (ref 0–0.5)
LYMPHOCYTES # BLD AUTO: 1.96 10*3/MM3 (ref 0.7–3.1)
LYMPHOCYTES NFR BLD AUTO: 32.7 % (ref 19.6–45.3)
MCH RBC QN AUTO: 25 PG (ref 26.6–33)
MCHC RBC AUTO-ENTMCNC: 28.5 G/DL (ref 31.5–35.7)
MCV RBC AUTO: 87.7 FL (ref 79–97)
MONOCYTES # BLD AUTO: 0.47 10*3/MM3 (ref 0.1–0.9)
MONOCYTES NFR BLD AUTO: 7.8 % (ref 5–12)
NEUTROPHILS # BLD AUTO: 3.13 10*3/MM3 (ref 1.7–7)
NEUTROPHILS NFR BLD AUTO: 52.3 % (ref 42.7–76)
NRBC BLD AUTO-RTO: 0 /100 WBC (ref 0–0.2)
PLAT MORPH BLD: NORMAL
PLATELET # BLD AUTO: 240 10*3/MM3 (ref 140–450)
PMV BLD AUTO: 10.1 FL (ref 6–12)
POTASSIUM BLD-SCNC: 5.2 MMOL/L (ref 3.5–5.2)
PROT SERPL-MCNC: 8 G/DL (ref 6–8.5)
RBC # BLD AUTO: 3.08 10*6/MM3 (ref 3.77–5.28)
SODIUM BLD-SCNC: 132 MMOL/L (ref 136–145)
WBC NRBC COR # BLD: 5.99 10*3/MM3 (ref 3.4–10.8)

## 2020-06-13 PROCEDURE — 63710000001 INSULIN ASPART PER 5 UNITS: Performed by: INTERNAL MEDICINE

## 2020-06-13 PROCEDURE — 63710000001 DIPHENHYDRAMINE PER 50 MG: Performed by: INTERNAL MEDICINE

## 2020-06-13 PROCEDURE — 85025 COMPLETE CBC W/AUTO DIFF WBC: CPT | Performed by: INTERNAL MEDICINE

## 2020-06-13 PROCEDURE — 82962 GLUCOSE BLOOD TEST: CPT

## 2020-06-13 PROCEDURE — 80053 COMPREHEN METABOLIC PANEL: CPT | Performed by: INTERNAL MEDICINE

## 2020-06-13 PROCEDURE — 99232 SBSQ HOSP IP/OBS MODERATE 35: CPT | Performed by: INTERNAL MEDICINE

## 2020-06-13 PROCEDURE — 63710000001 PROMETHAZINE PER 12.5 MG: Performed by: INTERNAL MEDICINE

## 2020-06-13 PROCEDURE — 85007 BL SMEAR W/DIFF WBC COUNT: CPT | Performed by: INTERNAL MEDICINE

## 2020-06-13 PROCEDURE — 25010000002 ENOXAPARIN PER 10 MG: Performed by: INTERNAL MEDICINE

## 2020-06-13 PROCEDURE — 63710000001 INSULIN DETEMIR PER 5 UNITS: Performed by: INTERNAL MEDICINE

## 2020-06-13 RX ORDER — BUMETANIDE 1 MG/1
1 TABLET ORAL DAILY
Status: DISCONTINUED | OUTPATIENT
Start: 2020-06-13 | End: 2020-06-15 | Stop reason: HOSPADM

## 2020-06-13 RX ADMIN — DOCUSATE SODIUM 100 MG: 100 CAPSULE, LIQUID FILLED ORAL at 08:34

## 2020-06-13 RX ADMIN — BUPRENORPHINE HYDROCHLORIDE AND NALOXONE HYDROCHLORIDE 1.25 TABLET: 8; 2 TABLET SUBLINGUAL at 08:32

## 2020-06-13 RX ADMIN — CARVEDILOL 6.25 MG: 6.25 TABLET, FILM COATED ORAL at 08:34

## 2020-06-13 RX ADMIN — LACTULOSE 30 G: 10 SOLUTION ORAL at 16:17

## 2020-06-13 RX ADMIN — INSULIN DETEMIR 20 UNITS: 100 INJECTION, SOLUTION SUBCUTANEOUS at 20:48

## 2020-06-13 RX ADMIN — BUMETANIDE 1 MG: 1 TABLET ORAL at 12:17

## 2020-06-13 RX ADMIN — DOCUSATE SODIUM 50 MG AND SENNOSIDES 8.6 MG 1 TABLET: 8.6; 5 TABLET, FILM COATED ORAL at 20:48

## 2020-06-13 RX ADMIN — CLONIDINE HYDROCHLORIDE 0.2 MG: 0.2 TABLET ORAL at 20:48

## 2020-06-13 RX ADMIN — DOCUSATE SODIUM 50 MG AND SENNOSIDES 8.6 MG 1 TABLET: 8.6; 5 TABLET, FILM COATED ORAL at 08:34

## 2020-06-13 RX ADMIN — ENOXAPARIN SODIUM 40 MG: 40 INJECTION SUBCUTANEOUS at 08:34

## 2020-06-13 RX ADMIN — NYSTATIN: 100000 POWDER TOPICAL at 20:48

## 2020-06-13 RX ADMIN — PROMETHAZINE HYDROCHLORIDE 12.5 MG: 12.5 TABLET ORAL at 08:34

## 2020-06-13 RX ADMIN — ROPINIROLE HYDROCHLORIDE 0.25 MG: 0.25 TABLET, FILM COATED ORAL at 20:48

## 2020-06-13 RX ADMIN — CLONIDINE HYDROCHLORIDE 0.2 MG: 0.2 TABLET ORAL at 08:34

## 2020-06-13 RX ADMIN — BACITRACIN: 500 OINTMENT TOPICAL at 08:34

## 2020-06-13 RX ADMIN — POLYETHYLENE GLYCOL (3350) 17 G: 17 POWDER, FOR SOLUTION ORAL at 08:34

## 2020-06-13 RX ADMIN — BACITRACIN: 500 OINTMENT TOPICAL at 20:47

## 2020-06-13 RX ADMIN — TIZANIDINE 4 MG: 4 TABLET ORAL at 08:34

## 2020-06-13 RX ADMIN — INSULIN ASPART 4 UNITS: 100 INJECTION, SOLUTION INTRAVENOUS; SUBCUTANEOUS at 12:16

## 2020-06-13 RX ADMIN — INSULIN ASPART 3 UNITS: 100 INJECTION, SOLUTION INTRAVENOUS; SUBCUTANEOUS at 16:54

## 2020-06-13 RX ADMIN — Medication 1 CAPSULE: at 08:34

## 2020-06-13 RX ADMIN — FOLIC ACID 1 MG: 1 TABLET ORAL at 08:34

## 2020-06-13 RX ADMIN — DIPHENHYDRAMINE HYDROCHLORIDE 25 MG: 25 CAPSULE ORAL at 08:32

## 2020-06-13 RX ADMIN — CARVEDILOL 6.25 MG: 6.25 TABLET, FILM COATED ORAL at 16:54

## 2020-06-13 RX ADMIN — FLUDROCORTISONE ACETATE 100 MCG: 0.1 TABLET ORAL at 08:34

## 2020-06-13 RX ADMIN — SODIUM CHLORIDE, PRESERVATIVE FREE 10 ML: 5 INJECTION INTRAVENOUS at 08:35

## 2020-06-13 RX ADMIN — LACTULOSE 30 G: 10 SOLUTION ORAL at 08:35

## 2020-06-13 RX ADMIN — NYSTATIN: 100000 POWDER TOPICAL at 08:31

## 2020-06-13 RX ADMIN — Medication 10 MG: at 20:48

## 2020-06-13 RX ADMIN — LACTULOSE 30 G: 10 SOLUTION ORAL at 22:12

## 2020-06-13 NOTE — PLAN OF CARE
Patient frequently requests phenergan for nausea, but also asks for food at frequent intervals.  Is upset her phenergan has been changed from IV to PO.

## 2020-06-13 NOTE — PROGRESS NOTES
Nephrology Progress Note      Subjective     Patient feels better denies any shortness of breath    Objective       Vital signs :     Temp:  [97.5 °F (36.4 °C)-98.4 °F (36.9 °C)] 98 °F (36.7 °C)  Heart Rate:  [81-86] 86  Resp:  [18] 18  BP: (135-148)/(91-96) 148/93      Intake/Output Summary (Last 24 hours) at 6/13/2020 0932  Last data filed at 6/13/2020 0300  Gross per 24 hour   Intake 960 ml   Output --   Net 960 ml       Physical Exam:    General Appearance : not in acute distress  Lungs : clear to auscultation, respirations regular  Heart :  regular rhythm & normal rate, normal S1, S2 and no murmur, no rub  Abdomen : normal bowel sounds, no masses, no hepatomegaly, no splenomegaly, soft non-tender and no guarding  Extremities : moves extremities well, no edema, no cyanosis and no redness  Skin :  no bleeding, bruising or rash  Neurologic :   orientated to person, place, time and situation, Grossly no focal deficits      Laboratory Data :     Albumin Albumin   Date Value Ref Range Status   06/13/2020 2.45 (L) 3.50 - 5.20 g/dL Final      Magnesium No results found for: MG       PTH               No results found for: PTH    CBC and coagulation:  Results from last 7 days   Lab Units 06/13/20  0356 06/12/20 0447 06/11/20  0105   WBC 10*3/mm3 5.99 6.61 6.54   HEMOGLOBIN g/dL 7.7* 8.0* 7.9*   HEMATOCRIT % 27.0* 28.3* 28.9*   MCV fL 87.7 87.6 90.0   MCHC g/dL 28.5* 28.3* 27.3*   PLATELETS 10*3/mm3 240 275 278     Acid/base balance:      Renal and electrolytes:  Results from last 7 days   Lab Units 06/13/20  0356 06/12/20 0447 06/11/20  0105 06/10/20  0337 06/09/20  0353   SODIUM mmol/L 132* 133* 130* 130* 129*   POTASSIUM mmol/L 5.2 5.7* 5.7* 5.6* 5.1   CHLORIDE mmol/L 99 100 99 98 97*   CO2 mmol/L 25.4 24.5 22.1 25.0 23.7   BUN mg/dL 41* 45* 44* 39* 34*   CREATININE mg/dL 1.21* 1.30* 1.44* 1.33* 1.24*   EGFR IF NONAFRICN AM mL/min/1.73 49* 45* 40* 44* 48*   CALCIUM mg/dL 8.8 9.0 8.8 8.5* 8.5*     Estimated  Creatinine Clearance: 65.5 mL/min (A) (by C-G formula based on SCr of 1.21 mg/dL (H)).    Liver and pancreatic function:  Results from last 7 days   Lab Units 06/13/20  0356 06/09/20  0353 06/07/20 0441   ALBUMIN g/dL 2.45* 2.49* 2.3*   BILIRUBIN mg/dL <0.2* <0.2*  --    ALK PHOS U/L 171* 163*  --    AST (SGOT) U/L 41* 21  --    ALT (SGPT) U/L 29 17  --          Cardiac:  Results from last 7 days   Lab Units 06/10/20  0337   PROBNP pg/mL 1,680.0*     Liver and pancreatic function:  Results from last 7 days   Lab Units 06/13/20  0356 06/09/20  0353 06/07/20  0441   ALBUMIN g/dL 2.45* 2.49* 2.3*   BILIRUBIN mg/dL <0.2* <0.2*  --    ALK PHOS U/L 171* 163*  --    AST (SGOT) U/L 41* 21  --    ALT (SGPT) U/L 29 17  --        Medications :       bacitracin  Topical Q12H   buprenorphine-naloxone 1.25 tablet Sublingual Daily   carvedilol 6.25 mg Oral BID With Meals   cholecalciferol 50,000 Units Oral Q7 Days   cloNIDine 0.2 mg Oral Q12H   docusate sodium 100 mg Oral BID   enoxaparin 40 mg Subcutaneous Q24H   fludrocortisone 100 mcg Oral Daily   folic acid 1 mg Oral Daily   insulin aspart 0-7 Units Subcutaneous TID AC   insulin detemir 20 Units Subcutaneous Nightly   lactobacillus acidophilus 1 capsule Oral Daily   lactulose 30 g Oral TID   lactulose 40 g Oral Once   melatonin 10 mg Oral Nightly   metOLazone 5 mg Oral Once   nystatin  Topical Q12H   polyethylene glycol 17 g Oral Daily   rOPINIRole 0.25 mg Oral Nightly   senna-docusate sodium 1 tablet Oral BID   sodium chloride 10 mL Intravenous Q12H   sodium chloride 10 mL Intravenous Q12H   sodium chloride 10 mL Intravenous Q12H            Assessment/Plan     1. Persistent Hyperkalemia  2. Sever constipation  3. SARINA on CKD  4. DM-II  5. Essential hypertension     Patient potassium is better now , with Florinef 100 mcg daily , Cr is slightly better today. Serology positive for Hep C and evidence of past infection with Hep B.  We have sent cyroglobulins. Insurance has refused  PatIromir pre auth, will contact again as we have already tried Kayexylate and beyond this she will have significant higher risk of gut necrosis.     We will start p.o. Bumex today     Hyperkalemia likely 2/2 sever constipation, less likely from RTA. 4  UAG 33, suggestive of RTA, likely type IV with hyporeninemic hypoaldosteronism  Baseline Cr <1, admitted with 1.3, 2.9 G proteinuria, likely diabetic nephrosclerosis  Serology is negative  -cotinue on docusate, senna  - fludrocortisone 100mcg daily    Prognosis is guarded    Plan of care were discussed with the patient she understood verbalized agrees    S Olu Dubois MD  06/13/20  09:32

## 2020-06-14 LAB
ALBUMIN SERPL-MCNC: 2.67 G/DL (ref 3.5–5.2)
ALBUMIN/GLOB SERPL: 0.5 G/DL
ALP SERPL-CCNC: 184 U/L (ref 39–117)
ALT SERPL W P-5'-P-CCNC: 37 U/L (ref 1–33)
ANION GAP SERPL CALCULATED.3IONS-SCNC: 7.1 MMOL/L (ref 5–15)
ANISOCYTOSIS BLD QL: NORMAL
AST SERPL-CCNC: 50 U/L (ref 1–32)
BASOPHILS # BLD AUTO: 0.09 10*3/MM3 (ref 0–0.2)
BASOPHILS NFR BLD AUTO: 1.6 % (ref 0–1.5)
BILIRUB SERPL-MCNC: 0.2 MG/DL (ref 0.2–1.2)
BUN BLD-MCNC: 47 MG/DL (ref 6–20)
BUN/CREAT SERPL: 36.7 (ref 7–25)
CALCIUM SPEC-SCNC: 9.2 MG/DL (ref 8.6–10.5)
CHLORIDE SERPL-SCNC: 98 MMOL/L (ref 98–107)
CO2 SERPL-SCNC: 24.9 MMOL/L (ref 22–29)
CREAT BLD-MCNC: 1.28 MG/DL (ref 0.57–1)
DEPRECATED RDW RBC AUTO: 61.1 FL (ref 37–54)
EOSINOPHIL # BLD AUTO: 0.45 10*3/MM3 (ref 0–0.4)
EOSINOPHIL NFR BLD AUTO: 7.8 % (ref 0.3–6.2)
ERYTHROCYTE [DISTWIDTH] IN BLOOD BY AUTOMATED COUNT: 19 % (ref 12.3–15.4)
GFR SERPL CREATININE-BSD FRML MDRD: 46 ML/MIN/1.73
GLOBULIN UR ELPH-MCNC: 5.8 GM/DL
GLUCOSE BLD-MCNC: 181 MG/DL (ref 65–99)
GLUCOSE BLDC GLUCOMTR-MCNC: 175 MG/DL (ref 70–130)
GLUCOSE BLDC GLUCOMTR-MCNC: 200 MG/DL (ref 70–130)
GLUCOSE BLDC GLUCOMTR-MCNC: 251 MG/DL (ref 70–130)
GLUCOSE BLDC GLUCOMTR-MCNC: 269 MG/DL (ref 70–130)
HCT VFR BLD AUTO: 31 % (ref 34–46.6)
HGB BLD-MCNC: 8.8 G/DL (ref 12–15.9)
HYPOCHROMIA BLD QL: NORMAL
IMM GRANULOCYTES # BLD AUTO: 0.01 10*3/MM3 (ref 0–0.05)
IMM GRANULOCYTES NFR BLD AUTO: 0.2 % (ref 0–0.5)
LYMPHOCYTES # BLD AUTO: 2.17 10*3/MM3 (ref 0.7–3.1)
LYMPHOCYTES NFR BLD AUTO: 37.7 % (ref 19.6–45.3)
MAGNESIUM SERPL-MCNC: 2 MG/DL (ref 1.6–2.6)
MCH RBC QN AUTO: 24.9 PG (ref 26.6–33)
MCHC RBC AUTO-ENTMCNC: 28.4 G/DL (ref 31.5–35.7)
MCV RBC AUTO: 87.8 FL (ref 79–97)
MONOCYTES # BLD AUTO: 0.46 10*3/MM3 (ref 0.1–0.9)
MONOCYTES NFR BLD AUTO: 8 % (ref 5–12)
NEUTROPHILS # BLD AUTO: 2.57 10*3/MM3 (ref 1.7–7)
NEUTROPHILS NFR BLD AUTO: 44.7 % (ref 42.7–76)
NRBC BLD AUTO-RTO: 0 /100 WBC (ref 0–0.2)
PLAT MORPH BLD: NORMAL
PLATELET # BLD AUTO: 274 10*3/MM3 (ref 140–450)
PMV BLD AUTO: 10.2 FL (ref 6–12)
POTASSIUM BLD-SCNC: 5.2 MMOL/L (ref 3.5–5.2)
PROT SERPL-MCNC: 8.5 G/DL (ref 6–8.5)
RBC # BLD AUTO: 3.53 10*6/MM3 (ref 3.77–5.28)
SODIUM BLD-SCNC: 130 MMOL/L (ref 136–145)
WBC NRBC COR # BLD: 5.75 10*3/MM3 (ref 3.4–10.8)

## 2020-06-14 PROCEDURE — 85025 COMPLETE CBC W/AUTO DIFF WBC: CPT | Performed by: INTERNAL MEDICINE

## 2020-06-14 PROCEDURE — 63710000001 INSULIN ASPART PER 5 UNITS: Performed by: INTERNAL MEDICINE

## 2020-06-14 PROCEDURE — 99232 SBSQ HOSP IP/OBS MODERATE 35: CPT | Performed by: INTERNAL MEDICINE

## 2020-06-14 PROCEDURE — 82962 GLUCOSE BLOOD TEST: CPT

## 2020-06-14 PROCEDURE — 63710000001 INSULIN DETEMIR PER 5 UNITS: Performed by: INTERNAL MEDICINE

## 2020-06-14 PROCEDURE — 63710000001 PROMETHAZINE PER 12.5 MG: Performed by: INTERNAL MEDICINE

## 2020-06-14 PROCEDURE — 63710000001 DIPHENHYDRAMINE PER 50 MG: Performed by: INTERNAL MEDICINE

## 2020-06-14 PROCEDURE — 85007 BL SMEAR W/DIFF WBC COUNT: CPT | Performed by: INTERNAL MEDICINE

## 2020-06-14 PROCEDURE — 80053 COMPREHEN METABOLIC PANEL: CPT | Performed by: INTERNAL MEDICINE

## 2020-06-14 PROCEDURE — 83735 ASSAY OF MAGNESIUM: CPT | Performed by: INTERNAL MEDICINE

## 2020-06-14 PROCEDURE — 94799 UNLISTED PULMONARY SVC/PX: CPT

## 2020-06-14 PROCEDURE — 25010000002 ENOXAPARIN PER 10 MG: Performed by: INTERNAL MEDICINE

## 2020-06-14 RX ADMIN — CLONIDINE HYDROCHLORIDE 0.2 MG: 0.2 TABLET ORAL at 07:14

## 2020-06-14 RX ADMIN — INSULIN DETEMIR 25 UNITS: 100 INJECTION, SOLUTION SUBCUTANEOUS at 20:41

## 2020-06-14 RX ADMIN — SODIUM CHLORIDE, PRESERVATIVE FREE 10 ML: 5 INJECTION INTRAVENOUS at 20:40

## 2020-06-14 RX ADMIN — BUMETANIDE 1 MG: 1 TABLET ORAL at 07:13

## 2020-06-14 RX ADMIN — BACITRACIN: 500 OINTMENT TOPICAL at 07:16

## 2020-06-14 RX ADMIN — INSULIN ASPART 4 UNITS: 100 INJECTION, SOLUTION INTRAVENOUS; SUBCUTANEOUS at 16:41

## 2020-06-14 RX ADMIN — PROMETHAZINE HYDROCHLORIDE 12.5 MG: 12.5 TABLET ORAL at 07:13

## 2020-06-14 RX ADMIN — FLUDROCORTISONE ACETATE 100 MCG: 0.1 TABLET ORAL at 07:14

## 2020-06-14 RX ADMIN — CLONIDINE HYDROCHLORIDE 0.2 MG: 0.2 TABLET ORAL at 20:38

## 2020-06-14 RX ADMIN — NYSTATIN: 100000 POWDER TOPICAL at 20:39

## 2020-06-14 RX ADMIN — LACTULOSE 30 G: 10 SOLUTION ORAL at 16:41

## 2020-06-14 RX ADMIN — BACITRACIN: 500 OINTMENT TOPICAL at 20:39

## 2020-06-14 RX ADMIN — TIZANIDINE 4 MG: 4 TABLET ORAL at 11:33

## 2020-06-14 RX ADMIN — DIPHENHYDRAMINE HYDROCHLORIDE 25 MG: 25 CAPSULE ORAL at 16:41

## 2020-06-14 RX ADMIN — SODIUM CHLORIDE, PRESERVATIVE FREE 10 ML: 5 INJECTION INTRAVENOUS at 07:16

## 2020-06-14 RX ADMIN — PROMETHAZINE HYDROCHLORIDE 12.5 MG: 12.5 TABLET ORAL at 16:41

## 2020-06-14 RX ADMIN — ENOXAPARIN SODIUM 40 MG: 40 INJECTION SUBCUTANEOUS at 07:15

## 2020-06-14 RX ADMIN — LACTULOSE 30 G: 10 SOLUTION ORAL at 20:37

## 2020-06-14 RX ADMIN — DOCUSATE SODIUM 100 MG: 100 CAPSULE, LIQUID FILLED ORAL at 07:14

## 2020-06-14 RX ADMIN — CARVEDILOL 6.25 MG: 6.25 TABLET, FILM COATED ORAL at 16:41

## 2020-06-14 RX ADMIN — CARVEDILOL 6.25 MG: 6.25 TABLET, FILM COATED ORAL at 07:14

## 2020-06-14 RX ADMIN — Medication 10 MG: at 20:38

## 2020-06-14 RX ADMIN — DIPHENHYDRAMINE HYDROCHLORIDE 25 MG: 25 CAPSULE ORAL at 07:15

## 2020-06-14 RX ADMIN — BUPRENORPHINE HYDROCHLORIDE AND NALOXONE HYDROCHLORIDE 1.25 TABLET: 8; 2 TABLET SUBLINGUAL at 07:15

## 2020-06-14 RX ADMIN — NYSTATIN: 100000 POWDER TOPICAL at 07:16

## 2020-06-14 RX ADMIN — INSULIN ASPART 2 UNITS: 100 INJECTION, SOLUTION INTRAVENOUS; SUBCUTANEOUS at 07:13

## 2020-06-14 RX ADMIN — FOLIC ACID 1 MG: 1 TABLET ORAL at 07:14

## 2020-06-14 RX ADMIN — LACTULOSE 30 G: 10 SOLUTION ORAL at 07:15

## 2020-06-14 RX ADMIN — POLYETHYLENE GLYCOL (3350) 17 G: 17 POWDER, FOR SOLUTION ORAL at 07:15

## 2020-06-14 RX ADMIN — DOCUSATE SODIUM 50 MG AND SENNOSIDES 8.6 MG 1 TABLET: 8.6; 5 TABLET, FILM COATED ORAL at 07:14

## 2020-06-14 RX ADMIN — INSULIN ASPART 3 UNITS: 100 INJECTION, SOLUTION INTRAVENOUS; SUBCUTANEOUS at 11:25

## 2020-06-14 RX ADMIN — Medication 1 CAPSULE: at 07:14

## 2020-06-14 RX ADMIN — ROPINIROLE HYDROCHLORIDE 0.25 MG: 0.25 TABLET, FILM COATED ORAL at 20:38

## 2020-06-14 RX ADMIN — TIZANIDINE 4 MG: 4 TABLET ORAL at 03:16

## 2020-06-14 RX ADMIN — DOCUSATE SODIUM 50 MG AND SENNOSIDES 8.6 MG 1 TABLET: 8.6; 5 TABLET, FILM COATED ORAL at 20:38

## 2020-06-14 NOTE — PLAN OF CARE
Patient c/o head ache, nausea, and muscle spasms this shift, see MAR for med administration.  Tolerated shift well.  Per nephrology, placed patient on  1L fluid restriction, sign placed above bed, patient verbalized understanding.

## 2020-06-14 NOTE — PROGRESS NOTES
Subjective     History:   Angelique Mcdermott is a 39 y.o. female admitted on 5/25/2020 secondary to Olecranon bursa abscess, left     Procedures: None    CC: Follow up hyperkalemia     Patient seen and examined with MCKENNA Cervantes. Awake and alert. Reports nausea and HA but stable. Reports mild dyspnea. No reported vomiting. BM yesterday. No acute events overnight per RN.     History taken from: patient, chart, and RN.      Objective     Vital Signs  Temp:  [97.8 °F (36.6 °C)-98.3 °F (36.8 °C)] 97.8 °F (36.6 °C)  Heart Rate:  [76-82] 80  Resp:  [18] 18  BP: (125-160)/() 125/80    Intake/Output Summary (Last 24 hours) at 6/14/2020 1459  Last data filed at 6/14/2020 1300  Gross per 24 hour   Intake 1560 ml   Output --   Net 1560 ml         Physical Exam:    General:    Awake, alert, in no acute distress, chronically ill appearing   Heart:      Normal S1 and S2. Regular rate and rhythm. No significant murmur, rubs or gallops appreciated.   Lungs:     Respirations regular, even and unlabored. Lungs clear to auscultation B/L. No wheezes, rales or rhonchi.   Abdomen:   Soft. Nontender. No guarding, rebound tenderness or organomegaly noted. Bowel sounds present x 4.   Extremities:  No clubbing, cyanosis or edema noted in RLE. RLE wounds bandaged. (+) LLE AKA. Minimal swelling noted around left elbow.        Results Review:    Results from last 7 days   Lab Units 06/14/20  0558 06/13/20  0356 06/12/20  0447 06/11/20  0105 06/10/20  0337 06/09/20  0353 06/08/20  0519   WBC 10*3/mm3 5.75 5.99 6.61 6.54 7.70 6.59 7.17   HEMOGLOBIN g/dL 8.8* 7.7* 8.0* 7.9* 7.3* 7.3* 7.1*   PLATELETS 10*3/mm3 274 240 275 278 297 304 301     Results from last 7 days   Lab Units 06/14/20  0558 06/13/20  0356 06/12/20  0447 06/11/20  0105 06/10/20  0337 06/09/20  0353 06/08/20  0519   SODIUM mmol/L 130* 132* 133* 130* 130* 129* 129*   POTASSIUM mmol/L 5.2 5.2 5.7* 5.7* 5.6* 5.1 5.0   CHLORIDE mmol/L 98 99 100 99 98 97* 96*   CO2 mmol/L 24.9 25.4  24.5 22.1 25.0 23.7 27.1   BUN mg/dL 47* 41* 45* 44* 39* 34* 34*   CREATININE mg/dL 1.28* 1.21* 1.30* 1.44* 1.33* 1.24* 1.22*   CALCIUM mg/dL 9.2 8.8 9.0 8.8 8.5* 8.5* 8.4*   GLUCOSE mg/dL 181* 232* 153* 244* 264* 238* 326*     Results from last 7 days   Lab Units 06/14/20  0558 06/13/20  0356 06/09/20  0353   BILIRUBIN mg/dL 0.2 <0.2* <0.2*   ALK PHOS U/L 184* 171* 163*   AST (SGOT) U/L 50* 41* 21   ALT (SGPT) U/L 37* 29 17     Results from last 7 days   Lab Units 06/14/20  0558   MAGNESIUM mg/dL 2.0               Imaging Results (Last 24 Hours)     ** No results found for the last 24 hours. **            Medications:    bacitracin  Topical Q12H   bumetanide 1 mg Oral Daily   buprenorphine-naloxone 1.25 tablet Sublingual Daily   carvedilol 6.25 mg Oral BID With Meals   cholecalciferol 50,000 Units Oral Q7 Days   cloNIDine 0.2 mg Oral Q12H   docusate sodium 100 mg Oral BID   enoxaparin 40 mg Subcutaneous Q24H   fludrocortisone 100 mcg Oral Daily   folic acid 1 mg Oral Daily   insulin aspart 0-7 Units Subcutaneous TID AC   insulin detemir 25 Units Subcutaneous Nightly   lactobacillus acidophilus 1 capsule Oral Daily   lactulose 30 g Oral TID   lactulose 40 g Oral Once   melatonin 10 mg Oral Nightly   metOLazone 5 mg Oral Once   nystatin  Topical Q12H   polyethylene glycol 17 g Oral Daily   rOPINIRole 0.25 mg Oral Nightly   senna-docusate sodium 1 tablet Oral BID   sodium chloride 10 mL Intravenous Q12H   sodium chloride 10 mL Intravenous Q12H   sodium chloride 10 mL Intravenous Q12H              Assessment/Plan   UTI: Urine culture finalized with mixed jefferson. Completed course of Rocephin.         Nausea, vomiting and abdominal pain: CT abd/pelvis revealed no acute abnormalities with extensive soft tissue edema throughout the body wall and mesentary with no ascites, hepatosplenomegaly, cholecystectomy, hysterectomy, likely prior appendectomy, RLQ hernia repair and nonobstructing right renal calculi. Gastric emptying  study is normal. Symptoms overall improved. Cont treatment of constipation.     DM II, insulin dependent with hyperglycemia: HgbA1c is 11.1. Pt admits to noncompliance with diabetic diet at home. Diabetes educator consulted. Basal insulin stopped as her BG decreased but later trended back upward. Improved over the last few days with recent insulin adjustments. Cont SSI with Accuchecks.      Essential HTN: BP elevated upon admission but much improved and now stable and controlled. Cont home Coreg and clonidine. Holding home lisinopril in the setting of hyperkalemia. Cont PRN IV hydralazine.      SARINA vs CKD III with proteinuria: Possibly 2/2 above. No hydronephrosis on CT. Holding home lisinopril. Creatine stable today. Repeat labs in the AM.     Hyperkalemia: Initially suspected to be 2/2 severe constipation. 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 Improved with fludrocortisone. K+ stable today. Pt has been intermittently noncompliant with bowel regimen.  Nephrology has increased fludrocortisone. Cont Bumex. Cont to encourage compliance with bowel regimen. Nephrology has requested that preauth for Patromir be attempted again. Nephrology input appreciated.       Anion gap metabolic acidosis: Possibly 2/2 above. Now resolved.       Mild troponin elevation with hx of CAD: No acute ischemic changes on EKG. Normalized on repeat. Echo reveals an EF of 56-60%, grade I diastolic dysfunction and mild TR. Pt denies CP. Monitor on telemetry.       Hyponatremia: Slightly decreased today. Cont Bumex. Nephrology has ordered fluid restriction. Monitor for fluid retention in the setting of treatment with fludrocortisone. Cont to monitor.      Normocytic anemia: Iron deficient and nephrology ordered a dose of Venofer. Folate and Vit D are low and supplementation has been started. Stable today. Repeat CBC in the AM.      Substance abuse: UDS positive for amphetamines and buprenorphine. Pt is prescribed  Suboxone and admits to recent methamphetamine abuse. Denies recent IVDA. Declines any information regarding substance abuse programs.      Cirrhosis with hx of Hep B and C: Viral hepatitis panel reveals Hep C Ab reactivity. Will need outpatient follow up with GI.     Left elbow effusion with concern for bursitis: No acute findings on xray. [facility] from [facility] reveal MRSA on cultures. Surgical intervention planned but pt refused, stating she did not want to be NPO for an extended period of time. She later became agreeable to surgery but she clinically improved with IV antibiotics and ortho did not think she needed surgery. Course of Vanc and Rocephin now completed. Ortho has signed off with input appreciated.     Severe constipation: Cont aggressive bowel regimen.     Hypomagnesemia: Improved with supplementation.      DVT PPX: SQ Lovenox     Discussed with Dr. Dubois.         Jeremiah Medina DO  06/14/20  14:59

## 2020-06-14 NOTE — PROGRESS NOTES
Nephrology Progress Note      Subjective     Patient is eating a lot of ice has some shortness of breath also    Objective       Vital signs :     Temp:  [97.2 °F (36.2 °C)-98.3 °F (36.8 °C)] 97.8 °F (36.6 °C)  Heart Rate:  [76-82] 81  Resp:  [18] 18  BP: (130-160)/() 160/100      Intake/Output Summary (Last 24 hours) at 6/14/2020 1001  Last data filed at 6/14/2020 0800  Gross per 24 hour   Intake 1200 ml   Output --   Net 1200 ml       Physical Exam:    General Appearance : not in acute distress  Lungs : clear to auscultation, respirations regular  Heart :  regular rhythm & normal rate, normal S1, S2 and no murmur, no rub  Abdomen : normal bowel sounds, no masses, no hepatomegaly, no splenomegaly, soft non-tender and no guarding  Extremities : moves extremities well, no edema, no cyanosis and no redness  Skin :  no bleeding, bruising or rash  Neurologic :   orientated to person, place, time and situation, Grossly no focal deficits      Laboratory Data :     Albumin Albumin   Date Value Ref Range Status   06/14/2020 2.67 (L) 3.50 - 5.20 g/dL Final   06/13/2020 2.45 (L) 3.50 - 5.20 g/dL Final      Magnesium Magnesium   Date Value Ref Range Status   06/14/2020 2.0 1.6 - 2.6 mg/dL Final          PTH               No results found for: PTH    CBC and coagulation:  Results from last 7 days   Lab Units 06/14/20  0558 06/13/20  0356 06/12/20  0447   WBC 10*3/mm3 5.75 5.99 6.61   HEMOGLOBIN g/dL 8.8* 7.7* 8.0*   HEMATOCRIT % 31.0* 27.0* 28.3*   MCV fL 87.8 87.7 87.6   MCHC g/dL 28.4* 28.5* 28.3*   PLATELETS 10*3/mm3 274 240 275     Acid/base balance:      Renal and electrolytes:  Results from last 7 days   Lab Units 06/14/20  0558 06/13/20  0356 06/12/20  0447 06/11/20  0105 06/10/20  0337   SODIUM mmol/L 130* 132* 133* 130* 130*   POTASSIUM mmol/L 5.2 5.2 5.7* 5.7* 5.6*   MAGNESIUM mg/dL 2.0  --   --   --   --    CHLORIDE mmol/L 98 99 100 99 98   CO2 mmol/L 24.9 25.4 24.5 22.1 25.0   BUN mg/dL 47* 41* 45* 44* 39*    CREATININE mg/dL 1.28* 1.21* 1.30* 1.44* 1.33*   EGFR IF NONAFRICN AM mL/min/1.73 46* 49* 45* 40* 44*   CALCIUM mg/dL 9.2 8.8 9.0 8.8 8.5*     Estimated Creatinine Clearance: 62.4 mL/min (A) (by C-G formula based on SCr of 1.28 mg/dL (H)).    Liver and pancreatic function:  Results from last 7 days   Lab Units 06/14/20  0558 06/13/20  0356 06/09/20  0353   ALBUMIN g/dL 2.67* 2.45* 2.49*   BILIRUBIN mg/dL 0.2 <0.2* <0.2*   ALK PHOS U/L 184* 171* 163*   AST (SGOT) U/L 50* 41* 21   ALT (SGPT) U/L 37* 29 17         Cardiac:  Results from last 7 days   Lab Units 06/10/20  0337   PROBNP pg/mL 1,680.0*     Liver and pancreatic function:  Results from last 7 days   Lab Units 06/14/20  0558 06/13/20  0356 06/09/20  0353   ALBUMIN g/dL 2.67* 2.45* 2.49*   BILIRUBIN mg/dL 0.2 <0.2* <0.2*   ALK PHOS U/L 184* 171* 163*   AST (SGOT) U/L 50* 41* 21   ALT (SGPT) U/L 37* 29 17       Medications :       bacitracin  Topical Q12H   bumetanide 1 mg Oral Daily   buprenorphine-naloxone 1.25 tablet Sublingual Daily   carvedilol 6.25 mg Oral BID With Meals   cholecalciferol 50,000 Units Oral Q7 Days   cloNIDine 0.2 mg Oral Q12H   docusate sodium 100 mg Oral BID   enoxaparin 40 mg Subcutaneous Q24H   fludrocortisone 100 mcg Oral Daily   folic acid 1 mg Oral Daily   insulin aspart 0-7 Units Subcutaneous TID AC   insulin detemir 25 Units Subcutaneous Nightly   lactobacillus acidophilus 1 capsule Oral Daily   lactulose 30 g Oral TID   lactulose 40 g Oral Once   melatonin 10 mg Oral Nightly   metOLazone 5 mg Oral Once   nystatin  Topical Q12H   polyethylene glycol 17 g Oral Daily   rOPINIRole 0.25 mg Oral Nightly   senna-docusate sodium 1 tablet Oral BID   sodium chloride 10 mL Intravenous Q12H   sodium chloride 10 mL Intravenous Q12H   sodium chloride 10 mL Intravenous Q12H            Assessment/Plan     1. Persistent Hyperkalemia  2. Sever constipation  3. SARINA on CKD  4. DM-II  5. Essential hypertension  6.  Hyponatremia     She has been  eating a lot of ice so I counseled her on decreasing ice patient is on Florinef also which can cause fluid retention so I will start the patient on Bumex and fluid restriction patient potassium is better now , with Florinef 100 mcg daily , Cr is stable. Serology positive for Hep C and evidence of past infection with Hep B.  We have sent cyroglobulins.  Patient has not required Kayexalate for the last 48-hour but insurance has refused PatIromir pre auth, will contact again as we have already tried Kayexylate and beyond this she will have significant higher risk of gut necrosis.      We will give Bumex 1 mg daily     Hyperkalemia likely 2/2 sever constipation, less likely from RTA. 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

## 2020-06-14 NOTE — PROGRESS NOTES
Subjective     History:   Angelique Mcdermott is a 39 y.o. female admitted on 5/25/2020 secondary to Olecranon bursa abscess, left     Procedures: None    CC: Follow up hyperkalemia     Patient seen and examined with MCKENNA Cervantes. Sleeping upon my arrival but easily awakens. Continues to report nausea but HA is improved. Dyspnea appears improved. Reports BM this AM. No acute events overnight per RN.     History taken from: patient, chart, and RN.      Objective     Vital Signs  Temp:  [97.2 °F (36.2 °C)-98.2 °F (36.8 °C)] 98 °F (36.7 °C)  Heart Rate:  [76-86] 82  Resp:  [18] 18  BP: (130-148)/(85-94) 130/88    Intake/Output Summary (Last 24 hours) at 6/13/2020 2114  Last data filed at 6/13/2020 1800  Gross per 24 hour   Intake 1320 ml   Output --   Net 1320 ml         Physical Exam: Unchanged from previous   General:    Awake, alert, in no acute distress, chronically ill appearing   Heart:      Normal S1 and S2. Regular rate and rhythm. No significant murmur, rubs or gallops appreciated.   Lungs:     Respirations regular, even and unlabored. Lungs clear to auscultation B/L. No wheezes, rales or rhonchi.   Abdomen:   Soft. Nontender. No guarding, rebound tenderness or organomegaly noted. Bowel sounds present x 4.   Extremities:  No clubbing, cyanosis or edema noted in RLE. RLE wounds bandaged. (+) LLE AKA. Left elbow swelling much improved with no surrounding erythema.       Results Review:    Results from last 7 days   Lab Units 06/13/20  0356 06/12/20  0447 06/11/20  0105 06/10/20  0337 06/09/20  0353 06/08/20  0519 06/07/20  0750   WBC 10*3/mm3 5.99 6.61 6.54 7.70 6.59 7.17 7.65   HEMOGLOBIN g/dL 7.7* 8.0* 7.9* 7.3* 7.3* 7.1* 7.1*   PLATELETS 10*3/mm3 240 275 278 297 304 301 293     Results from last 7 days   Lab Units 06/13/20  0356 06/12/20  0447 06/11/20  0105 06/10/20  0337 06/09/20  0353 06/08/20  0519 06/07/20  0750   SODIUM mmol/L 132* 133* 130* 130* 129* 129* 130*   POTASSIUM mmol/L 5.2 5.7* 5.7* 5.6* 5.1 5.0  5.3*   CHLORIDE mmol/L 99 100 99 98 97* 96* 100   CO2 mmol/L 25.4 24.5 22.1 25.0 23.7 27.1 23.7   BUN mg/dL 41* 45* 44* 39* 34* 34* 34*   CREATININE mg/dL 1.21* 1.30* 1.44* 1.33* 1.24* 1.22* 1.27*   CALCIUM mg/dL 8.8 9.0 8.8 8.5* 8.5* 8.4* 8.5*   GLUCOSE mg/dL 232* 153* 244* 264* 238* 326* 256*     Results from last 7 days   Lab Units 06/13/20  0356 06/09/20  0353   BILIRUBIN mg/dL <0.2* <0.2*   ALK PHOS U/L 171* 163*   AST (SGOT) U/L 41* 21   ALT (SGPT) U/L 29 17                   Imaging Results (Last 24 Hours)     ** No results found for the last 24 hours. **            Medications:    bacitracin  Topical Q12H   bumetanide 1 mg Oral Daily   buprenorphine-naloxone 1.25 tablet Sublingual Daily   carvedilol 6.25 mg Oral BID With Meals   cholecalciferol 50,000 Units Oral Q7 Days   cloNIDine 0.2 mg Oral Q12H   docusate sodium 100 mg Oral BID   enoxaparin 40 mg Subcutaneous Q24H   fludrocortisone 100 mcg Oral Daily   folic acid 1 mg Oral Daily   insulin aspart 0-7 Units Subcutaneous TID AC   insulin detemir 20 Units Subcutaneous Nightly   lactobacillus acidophilus 1 capsule Oral Daily   lactulose 30 g Oral TID   lactulose 40 g Oral Once   melatonin 10 mg Oral Nightly   metOLazone 5 mg Oral Once   nystatin  Topical Q12H   polyethylene glycol 17 g Oral Daily   rOPINIRole 0.25 mg Oral Nightly   senna-docusate sodium 1 tablet Oral BID   sodium chloride 10 mL Intravenous Q12H   sodium chloride 10 mL Intravenous Q12H   sodium chloride 10 mL Intravenous Q12H              Assessment/Plan   UTI: Urine culture finalized with mixed jefferson. Completed course of Rocephin.         Nausea, vomiting and abdominal pain: CT abd/pelvis revealed no acute abnormalities with extensive soft tissue edema throughout the body wall and mesentary with no ascites, hepatosplenomegaly, cholecystectomy, hysterectomy, likely prior appendectomy, RLQ hernia repair and nonobstructing right renal calculi. Gastric emptying study is normal. Symptoms overall  improved. Cont treatment of constipation.     DM II, insulin dependent with hyperglycemia: HgbA1c is 11.1. Pt admits to noncompliance with diabetic diet at home. Diabetes educator consulted. Basal insulin stopped as her BG decreased but later trended back upward. Improved over the last few days with recent insulin adjustments. Increase basal insulin today as her BG has been in the 200's. Cont SSI with Accuchecks.      Essential HTN: BP elevated upon admission but much improved and now stable and controlled. Cont home Coreg and clonidine. Holding home lisinopril in the setting of hyperkalemia. Cont PRN IV hydralazine.      SARINA vs CKD III with proteinuria: Possibly 2/2 above. No hydronephrosis on CT. Holding home lisinopril. Creatine improved today. Repeat labs in the AM.     Hyperkalemia: Initially suspected to be 2/2 severe constipation. 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 Improved with fludrocortisone. K+ improved today. Pt has been intermittently noncompliant with bowel regimen.  Nephrology has increased fludrocortisone. Cont Bumex. Cont to encourage compliance with bowel regimen. Nephrology has requested that preauth for Patromir be attempted again. Nephrology input appreciated.       Anion gap metabolic acidosis: Possibly 2/2 above. Now resolved.       Mild troponin elevation with hx of CAD: No acute ischemic changes on EKG. Normalized on repeat. Echo reveals an EF of 56-60%, grade I diastolic dysfunction and mild TR. Pt denies CP. Monitor on telemetry.       Hyponatremia: Stable today. Nephrology has transitioned to PO Bumex today. Cont to monitor.      Normocytic anemia: Iron deficient and nephrology ordered a dose of Venofer. Folate and Vit D are low and supplementation has been started. Stable today. Repeat CBC in the AM.      Substance abuse: UDS positive for amphetamines and buprenorphine. Pt is prescribed Suboxone and admits to recent methamphetamine abuse. Denies recent  IVDA. Declines any information regarding substance abuse programs.      Cirrhosis with hx of Hep B and C: Viral hepatitis panel reveals Hep C Ab reactivity. Will need outpatient follow up with GI.     Left elbow effusion with concern for bursitis: No acute findings on xray. Records from [facility] reveal MRSA on cultures. Surgical intervention planned but pt refused, stating she did not want to be NPO for an extended period of time. She later became agreeable to surgery but she clinically improved with IV antibiotics and ortho did not think she needed surgery. Course of Vanc and Rocephin now completed. Ortho has signed off with input appreciated.     Severe constipation: Cont aggressive bowel regimen.     Hypomagnesemia: Improved with supplementation.      DVT PPX: SQ Lovenox         Jeremiah Medina DO  06/13/20  21:14

## 2020-06-15 VITALS
SYSTOLIC BLOOD PRESSURE: 158 MMHG | HEIGHT: 67 IN | WEIGHT: 151.2 LBS | RESPIRATION RATE: 18 BRPM | HEART RATE: 86 BPM | OXYGEN SATURATION: 98 % | DIASTOLIC BLOOD PRESSURE: 99 MMHG | TEMPERATURE: 98 F | BODY MASS INDEX: 23.73 KG/M2

## 2020-06-15 LAB
ALBUMIN SERPL-MCNC: 2.88 G/DL (ref 3.5–5.2)
ALBUMIN/GLOB SERPL: 0.5 G/DL
ALP SERPL-CCNC: 175 U/L (ref 39–117)
ALT SERPL W P-5'-P-CCNC: 41 U/L (ref 1–33)
ANION GAP SERPL CALCULATED.3IONS-SCNC: 7.7 MMOL/L (ref 5–15)
AST SERPL-CCNC: 50 U/L (ref 1–32)
BASOPHILS # BLD AUTO: 0.09 10*3/MM3 (ref 0–0.2)
BASOPHILS NFR BLD AUTO: 1.7 % (ref 0–1.5)
BILIRUB SERPL-MCNC: <0.2 MG/DL (ref 0.2–1.2)
BUN BLD-MCNC: 45 MG/DL (ref 6–20)
BUN/CREAT SERPL: 34.4 (ref 7–25)
CALCIUM SPEC-SCNC: 9 MG/DL (ref 8.6–10.5)
CHLORIDE SERPL-SCNC: 98 MMOL/L (ref 98–107)
CO2 SERPL-SCNC: 28.3 MMOL/L (ref 22–29)
CREAT BLD-MCNC: 1.31 MG/DL (ref 0.57–1)
DEPRECATED RDW RBC AUTO: 58.5 FL (ref 37–54)
EOSINOPHIL # BLD AUTO: 0.36 10*3/MM3 (ref 0–0.4)
EOSINOPHIL NFR BLD AUTO: 6.6 % (ref 0.3–6.2)
ERYTHROCYTE [DISTWIDTH] IN BLOOD BY AUTOMATED COUNT: 18.5 % (ref 12.3–15.4)
GFR SERPL CREATININE-BSD FRML MDRD: 45 ML/MIN/1.73
GLOBULIN UR ELPH-MCNC: 5.7 GM/DL
GLUCOSE BLD-MCNC: 149 MG/DL (ref 65–99)
GLUCOSE BLDC GLUCOMTR-MCNC: 145 MG/DL (ref 70–130)
GLUCOSE BLDC GLUCOMTR-MCNC: 178 MG/DL (ref 70–130)
GLUCOSE BLDC GLUCOMTR-MCNC: 242 MG/DL (ref 70–130)
HCT VFR BLD AUTO: 28.6 % (ref 34–46.6)
HGB BLD-MCNC: 8.3 G/DL (ref 12–15.9)
IMM GRANULOCYTES # BLD AUTO: 0.01 10*3/MM3 (ref 0–0.05)
IMM GRANULOCYTES NFR BLD AUTO: 0.2 % (ref 0–0.5)
LYMPHOCYTES # BLD AUTO: 1.94 10*3/MM3 (ref 0.7–3.1)
LYMPHOCYTES NFR BLD AUTO: 35.8 % (ref 19.6–45.3)
MAGNESIUM SERPL-MCNC: 2.1 MG/DL (ref 1.6–2.6)
MCH RBC QN AUTO: 24.8 PG (ref 26.6–33)
MCHC RBC AUTO-ENTMCNC: 29 G/DL (ref 31.5–35.7)
MCV RBC AUTO: 85.4 FL (ref 79–97)
MONOCYTES # BLD AUTO: 0.45 10*3/MM3 (ref 0.1–0.9)
MONOCYTES NFR BLD AUTO: 8.3 % (ref 5–12)
NEUTROPHILS # BLD AUTO: 2.57 10*3/MM3 (ref 1.7–7)
NEUTROPHILS NFR BLD AUTO: 47.4 % (ref 42.7–76)
NRBC BLD AUTO-RTO: 0 /100 WBC (ref 0–0.2)
PLATELET # BLD AUTO: 260 10*3/MM3 (ref 140–450)
PMV BLD AUTO: 9.9 FL (ref 6–12)
POTASSIUM BLD-SCNC: 5.2 MMOL/L (ref 3.5–5.2)
PROT SERPL-MCNC: 8.6 G/DL (ref 6–8.5)
RBC # BLD AUTO: 3.35 10*6/MM3 (ref 3.77–5.28)
SODIUM BLD-SCNC: 134 MMOL/L (ref 136–145)
WBC NRBC COR # BLD: 5.42 10*3/MM3 (ref 3.4–10.8)

## 2020-06-15 PROCEDURE — 85025 COMPLETE CBC W/AUTO DIFF WBC: CPT | Performed by: INTERNAL MEDICINE

## 2020-06-15 PROCEDURE — 97530 THERAPEUTIC ACTIVITIES: CPT

## 2020-06-15 PROCEDURE — 80053 COMPREHEN METABOLIC PANEL: CPT | Performed by: INTERNAL MEDICINE

## 2020-06-15 PROCEDURE — 82962 GLUCOSE BLOOD TEST: CPT

## 2020-06-15 PROCEDURE — 94799 UNLISTED PULMONARY SVC/PX: CPT

## 2020-06-15 PROCEDURE — 63710000001 PROMETHAZINE PER 12.5 MG: Performed by: INTERNAL MEDICINE

## 2020-06-15 PROCEDURE — 83735 ASSAY OF MAGNESIUM: CPT | Performed by: INTERNAL MEDICINE

## 2020-06-15 PROCEDURE — 63710000001 INSULIN ASPART PER 5 UNITS: Performed by: INTERNAL MEDICINE

## 2020-06-15 PROCEDURE — 97110 THERAPEUTIC EXERCISES: CPT

## 2020-06-15 PROCEDURE — 99239 HOSP IP/OBS DSCHRG MGMT >30: CPT | Performed by: INTERNAL MEDICINE

## 2020-06-15 PROCEDURE — 25010000002 ENOXAPARIN PER 10 MG: Performed by: INTERNAL MEDICINE

## 2020-06-15 RX ORDER — FOLIC ACID 1 MG/1
1 TABLET ORAL DAILY
Qty: 30 TABLET | Refills: 0 | Status: SHIPPED | OUTPATIENT
Start: 2020-06-16

## 2020-06-15 RX ORDER — LACTULOSE 10 G/15ML
30 SOLUTION ORAL 3 TIMES DAILY
Qty: 1892 ML | Refills: 1 | Status: SHIPPED | OUTPATIENT
Start: 2020-06-15

## 2020-06-15 RX ORDER — DIAPER,BRIEF,INFANT-TODD,DISP
EACH MISCELLANEOUS EVERY 12 HOURS SCHEDULED
Qty: 28.4 G | Refills: 0 | Status: SHIPPED | OUTPATIENT
Start: 2020-06-15

## 2020-06-15 RX ORDER — FLUDROCORTISONE ACETATE 0.1 MG/1
0.1 TABLET ORAL DAILY
Qty: 30 TABLET | Refills: 0 | Status: SHIPPED | OUTPATIENT
Start: 2020-06-16

## 2020-06-15 RX ORDER — AMOXICILLIN 250 MG
1 CAPSULE ORAL 2 TIMES DAILY
Qty: 60 TABLET | Refills: 0 | Status: SHIPPED | OUTPATIENT
Start: 2020-06-15

## 2020-06-15 RX ORDER — BUMETANIDE 1 MG/1
1 TABLET ORAL DAILY
Qty: 30 TABLET | Refills: 0 | Status: SHIPPED | OUTPATIENT
Start: 2020-06-16

## 2020-06-15 RX ORDER — POLYETHYLENE GLYCOL 3350 17 G/17G
17 POWDER, FOR SOLUTION ORAL DAILY
Qty: 510 G | Refills: 0 | Status: SHIPPED | OUTPATIENT
Start: 2020-06-16

## 2020-06-15 RX ORDER — PSEUDOEPHEDRINE HCL 30 MG
100 TABLET ORAL 2 TIMES DAILY
Qty: 60 EACH | Refills: 0 | Status: SHIPPED | OUTPATIENT
Start: 2020-06-15

## 2020-06-15 RX ADMIN — INSULIN ASPART 3 UNITS: 100 INJECTION, SOLUTION INTRAVENOUS; SUBCUTANEOUS at 11:48

## 2020-06-15 RX ADMIN — BUMETANIDE 1 MG: 1 TABLET ORAL at 08:17

## 2020-06-15 RX ADMIN — DOCUSATE SODIUM 50 MG AND SENNOSIDES 8.6 MG 1 TABLET: 8.6; 5 TABLET, FILM COATED ORAL at 08:18

## 2020-06-15 RX ADMIN — BUPRENORPHINE HYDROCHLORIDE AND NALOXONE HYDROCHLORIDE 1.25 TABLET: 8; 2 TABLET SUBLINGUAL at 08:18

## 2020-06-15 RX ADMIN — Medication 1 CAPSULE: at 08:17

## 2020-06-15 RX ADMIN — TIZANIDINE 4 MG: 4 TABLET ORAL at 04:52

## 2020-06-15 RX ADMIN — FLUDROCORTISONE ACETATE 100 MCG: 0.1 TABLET ORAL at 08:17

## 2020-06-15 RX ADMIN — PROMETHAZINE HYDROCHLORIDE 12.5 MG: 12.5 TABLET ORAL at 10:23

## 2020-06-15 RX ADMIN — CLONIDINE HYDROCHLORIDE 0.2 MG: 0.2 TABLET ORAL at 08:17

## 2020-06-15 RX ADMIN — BACITRACIN: 500 OINTMENT TOPICAL at 08:16

## 2020-06-15 RX ADMIN — LACTULOSE 30 G: 10 SOLUTION ORAL at 10:23

## 2020-06-15 RX ADMIN — CARVEDILOL 6.25 MG: 6.25 TABLET, FILM COATED ORAL at 08:17

## 2020-06-15 RX ADMIN — ENOXAPARIN SODIUM 40 MG: 40 INJECTION SUBCUTANEOUS at 08:18

## 2020-06-15 RX ADMIN — FOLIC ACID 1 MG: 1 TABLET ORAL at 08:17

## 2020-06-15 RX ADMIN — SODIUM CHLORIDE, PRESERVATIVE FREE 10 ML: 5 INJECTION INTRAVENOUS at 08:22

## 2020-06-15 RX ADMIN — NYSTATIN: 100000 POWDER TOPICAL at 08:17

## 2020-06-15 NOTE — PLAN OF CARE
Problem: Patient Care Overview  Goal: Interprofessional Rounds/Family Conf  Outcome: Ongoing (interventions implemented as appropriate)  Note:   Wounds on legs improving, wound care done. Midline cath dressing change done, draws blood well. Pt has been educated on her fluid restriction of 1000 mls a day. No complaints.

## 2020-06-15 NOTE — PROGRESS NOTES
Nephrology Progress Note      Subjective     Pt feeling fine, no complaints.     Objective       Vital signs :     Temp:  [97.4 °F (36.3 °C)-98.3 °F (36.8 °C)] 97.9 °F (36.6 °C)  Heart Rate:  [73-81] 75  Resp:  [18] 18  BP: (125-145)/(76-94) 139/83      Intake/Output Summary (Last 24 hours) at 6/15/2020 1037  Last data filed at 6/15/2020 0842  Gross per 24 hour   Intake 1220 ml   Output --   Net 1220 ml       Physical Exam:    General Appearance : not in acute distress  Lungs : clear to auscultation, respirations regular  Heart :  regular rhythm & normal rate, normal S1, S2 and no murmur, no rub  Abdomen : normal bowel sounds, no masses, no hepatomegaly, no splenomegaly, soft non-tender and no guarding  Extremities : moves extremities well, no edema, no cyanosis and no redness  Skin :  no bleeding, bruising or rash  Neurologic :   orientated to person, place, time and situation, Grossly no focal deficits      Laboratory Data :     Albumin Albumin   Date Value Ref Range Status   06/15/2020 2.88 (L) 3.50 - 5.20 g/dL Final   06/14/2020 2.67 (L) 3.50 - 5.20 g/dL Final   06/13/2020 2.45 (L) 3.50 - 5.20 g/dL Final      Magnesium Magnesium   Date Value Ref Range Status   06/15/2020 2.1 1.6 - 2.6 mg/dL Final   06/14/2020 2.0 1.6 - 2.6 mg/dL Final          PTH               No results found for: PTH    CBC and coagulation:  Results from last 7 days   Lab Units 06/15/20  0830 06/14/20 0558 06/13/20  0356   WBC 10*3/mm3 5.42 5.75 5.99   HEMOGLOBIN g/dL 8.3* 8.8* 7.7*   HEMATOCRIT % 28.6* 31.0* 27.0*   MCV fL 85.4 87.8 87.7   MCHC g/dL 29.0* 28.4* 28.5*   PLATELETS 10*3/mm3 260 274 240     Acid/base balance:      Renal and electrolytes:  Results from last 7 days   Lab Units 06/15/20  0830 06/14/20  0558 06/13/20  0356 06/12/20  0447 06/11/20  0105   SODIUM mmol/L 134* 130* 132* 133* 130*   POTASSIUM mmol/L 5.2 5.2 5.2 5.7* 5.7*   MAGNESIUM mg/dL 2.1 2.0  --   --   --    CHLORIDE mmol/L 98 98 99 100 99   CO2 mmol/L 28.3 24.9  25.4 24.5 22.1   BUN mg/dL 45* 47* 41* 45* 44*   CREATININE mg/dL 1.31* 1.28* 1.21* 1.30* 1.44*   EGFR IF NONAFRICN AM mL/min/1.73 45* 46* 49* 45* 40*   CALCIUM mg/dL 9.0 9.2 8.8 9.0 8.8     Estimated Creatinine Clearance: 61.2 mL/min (A) (by C-G formula based on SCr of 1.31 mg/dL (H)).    Liver and pancreatic function:  Results from last 7 days   Lab Units 06/15/20  0830 06/14/20  0558 06/13/20  0356   ALBUMIN g/dL 2.88* 2.67* 2.45*   BILIRUBIN mg/dL <0.2* 0.2 <0.2*   ALK PHOS U/L 175* 184* 171*   AST (SGOT) U/L 50* 50* 41*   ALT (SGPT) U/L 41* 37* 29         Cardiac:  Results from last 7 days   Lab Units 06/10/20  0337   PROBNP pg/mL 1,680.0*     Liver and pancreatic function:  Results from last 7 days   Lab Units 06/15/20  0830 06/14/20  0558 06/13/20  0356   ALBUMIN g/dL 2.88* 2.67* 2.45*   BILIRUBIN mg/dL <0.2* 0.2 <0.2*   ALK PHOS U/L 175* 184* 171*   AST (SGOT) U/L 50* 50* 41*   ALT (SGPT) U/L 41* 37* 29       Medications :       bacitracin  Topical Q12H   bumetanide 1 mg Oral Daily   buprenorphine-naloxone 1.25 tablet Sublingual Daily   carvedilol 6.25 mg Oral BID With Meals   cholecalciferol 50,000 Units Oral Q7 Days   cloNIDine 0.2 mg Oral Q12H   docusate sodium 100 mg Oral BID   enoxaparin 40 mg Subcutaneous Q24H   fludrocortisone 100 mcg Oral Daily   folic acid 1 mg Oral Daily   insulin aspart 0-7 Units Subcutaneous TID AC   insulin detemir 25 Units Subcutaneous Nightly   lactobacillus acidophilus 1 capsule Oral Daily   lactulose 30 g Oral TID   lactulose 40 g Oral Once   melatonin 10 mg Oral Nightly   metOLazone 5 mg Oral Once   nystatin  Topical Q12H   polyethylene glycol 17 g Oral Daily   rOPINIRole 0.25 mg Oral Nightly   senna-docusate sodium 1 tablet Oral BID   sodium chloride 10 mL Intravenous Q12H   sodium chloride 10 mL Intravenous Q12H   sodium chloride 10 mL Intravenous Q12H            Assessment/Plan     1. Persistent Hyperkalemia  2. Sever constipation  3. SARINA on CKD  4. DM-II  5. Essential  hypertension  6.  Hyponatremia    Hyponatremia is better, Cr is stable at 1.3. K is also stable for past 3 days without requiring chelating therapy.   Cryoglobulins, hep C RNA pending. Likely SARINA will improve with treatment of hepatitis. At this point, will hold on doing kidney biopsy and will monitor closely in clinic. Pt need to continue on fluid restriction 800ml free water daily. Educated and counseled Pt who agreed with the plan.     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

## 2020-06-15 NOTE — PROGRESS NOTES
Resubmission for prior approval of Veltassa 8.4g once daily has been approved.  According to her insurance, she will have a $0 copay.  I have informed Dr. Medina since Dr. Dubois is no longer on this week for coverage and Adam has completed discharge.    Thank You;  Kellen Landa [facility]  06/15/20  11:43

## 2020-06-15 NOTE — PROGRESS NOTES
Discharge Planning Assessment   Lucien     Patient Name: Angelique Mcdermott  MRN: [id number]  Today's Date: 6/15/2020    Admit Date: 5/25/2020        Discharge Plan     Row Name 06/15/20 1433       Plan    Final Discharge Disposition Code  01 - home or self-care    Final Note  Pt to be discharged home on this date.  SS arranged [facility] at [phone] with pt's insurance covering cost.  Pt aware and agreeable.             Jannette Zapata, GONZALEZW

## 2020-06-15 NOTE — DISCHARGE SUMMARY
Date of Admission: 5/25/2020    Date of Discharge: 6/15/2020     PCP: Provider, No Known    Admission Diagnosis:   Please see admission H&P    Discharge Diagnosis:   Persistent hyperkalemia  Type IV RTA  SARINA vs CKD III with proteinuria  UTI  Left elbow effusion with septic bursitis  Severe constipation   Hyponatremia  Anion gap metabolic acidosis  Normocytic anemia with iron deficiency  Cirrhosis with hx of Hep B and C  Mild troponin elevation  Hypomagnesemia   Substance abuse   Medical noncompliance     Procedures Performed: None     Consults:   Consults     Date and Time Order Name Status Description    5/31/2020 0739 Inpatient Nephrology Consult Completed     5/26/2020 0902 Inpatient Orthopedic Surgery Consult Completed     5/25/2020 0603 Hospitalist (on-call MD unless specified)              History of Present Illness:  Angelique Mcdermott is a 39 y.o. female who presented to [facility] ED with CC of nausea, vomiting and abdominal pain. Please see admission H&P for complete details.     In the ED, she was afebrile, tachycardic and hypertensive. Routine labs revealed normal WBC, microcytic anemia, mild troponin elevation, hyperglycemia, hyponatremia with component with pseudohyponatremia, low CO2, elevated anion gap, elevated creatine, elevated alk phos, low albumin, elevated lipase, and Hep C Ab reactivity on viral hepatitis panel. UA was abnormal revealing glucose, protein, moderate leukocytes, 31-50 RBC's and too numerous to count WBC's with no bacteria seen. CXR revealed no active disease with left basilar scarring and multiple old bilateral rib fracture deformities. UDS was positive for amphetamines and buprenorphine. CT abd/pelvis revealed no acute abnormalities with extensive soft tissue edema throughout the body wall and mesentary with no ascites, hepatosplenomegaly, cholecystectomy, hysterectomy, likely prior appendectomy, RLQ hernia repair and nonobstructing right renal calculi. Cultures were obtained and IV  antibiotics initiated. She was given IV fluids, insulin, labetalol and morphine.       Hospital Course  Angelique Mcdermott was admitted to the PCU for further evaluation and treatment. She was continued on IV Rocephin empirically and maintenance IV fluids. Her home lisinopril was held upon admission. Her home Coreg and clonidine were continued and PRN IV hydralazine was added. Her BP improved and remained stable with restarting her home medications as above.     A gastric emptying study was completed for further evaluation with concern for possible gastroparesis in the setting of her GI symptoms and DM but it was normal. Further evaluation did reveal severe constipation and this was felt to be a large contributing factor to her symptoms. HgbA1c was 11.1 and she admitted to noncompliance with a diabetic diet at home. The hospital's diabetes educator was consulted. Her glucose improved significantly during her hospitalization and her basal insulin was significantly reduced. It was later titrated back upward as her BG joy following the drastic reduction. Her nausea, vomiting and abdominal pain significantly improved with treatment of her constipation.     Soon upon admission her left elbow was noted to be swollen and tender. No acute findings were noted on x-ray. Records from a recent hospitalization at [facility] were obtained revealing MRSA on cultures. Orthopedic surgery was consulted with initial plans for surgical intervention but she refused, stating she did not want to be NPO for an extended period of time. She later became agreeable to surgery but she clinically improved with IV antibiotics and ortho did not think she needed surgery. She would complete a course of Vanc and Rocephin while hospitalized.     Routine labs revealed persistent hyperkalemia and this resulted in her prolonged hospitalization. Despite aggressive bowel stimulation her K+ remained elevated and nephrology was consulted. Initially they felt her  hyperkalemia was related to severe constipation and she was continued on an aggressive bowel regimen. However, despite improvement in her constipation she remained hyperkalemic with workup suggestive of type IV RTA with hyporeninemic hypoaldosteronism. She was placed on fludrocortisone and the dose was later increased to 100mcg daily. With concern for fluid retention she was placed on Bumex and a daily fluid restriction. Over the course of several days her potassium and sodium stabilized with stable renal function. With concern that she may require chelating therapy nephrology asked pharmacy to assist with obtaining approval for Veltassa. This was initially declined by her insurance but later approved by the time of discharge. Nephrology was informed and recommended for this medication to be filled at discharge but to not be started until she was evaluated in their clinic as her potassium was remaining stable on her current regimen.     Ms. Mcdermott was seen and examined with MCKENNA Cuevas on 6/15/20. She remained afebrile and hemodynamically stable with stable AM labs. She remained clinically stable and deemed medically stable for discharge after discussion with nephrology. She was given prescriptions for all newly prescribed medications. The need for compliance with her bowel regimen, fluid restriction and diabetic diet were discussed at length. Repeat labs were ordered with results to be sent to nephrology. Instructions were given for follow up with her PCP as well as nephrology. She will need follow up with GI regarding her Hep C as well.       Condition on Discharge:  Stable    Vital Signs  Vitals:    06/15/20 1039   BP: (!) 148/102   Pulse: 89   Resp: 18   Temp: 98.2 °F (36.8 °C)   SpO2: 96%       Physical Exam:  General:    Awake, alert, in no acute distress, chronically ill appearing    Heart:      Normal S1 and S2. Regular rate and rhythm. No significant murmur, rubs or gallops appreciated.   Lungs:      Respirations regular, even and unlabored. Lungs clear to auscultation B/L. No wheezes, rales or rhonchi.   Abdomen:   Soft and nontender. No guarding, rebound tenderness or  organomegaly noted. Bowel sounds present x 4.   Extremities:  No clubbing, cyanosis or edema noted in RLE. RLE wounds bandaged. (+) LLE AKA. Minimal swelling noted around left elbow but significantly improved.      Discharge Disposition:   home      Discharge Medications:     Discharge Medications      New Medications      Instructions Start Date   bacitracin 500 UNIT/GM ointment   Topical, Every 12 Hours Scheduled      bumetanide 1 MG tablet  Commonly known as:  BUMEX   1 mg, Oral, Daily   Start Date:  June 16, 2020     cholecalciferol 1.25 MG (03891 UT) capsule  Commonly known as:  VITAMIN D3   50,000 Units, Oral, Every 7 Days   Start Date:  June 16, 2020     docusate sodium 100 MG capsule   100 mg, Oral, 2 Times Daily      fludrocortisone 0.1 MG tablet   0.1 mg, Oral, Daily   Start Date:  June 16, 2020     folic acid 1 MG tablet  Commonly known as:  FOLVITE   1 mg, Oral, Daily   Start Date:  June 16, 2020     insulin aspart 100 UNIT/ML injection  Commonly known as:  novoLOG   0-7 Units, Subcutaneous, 3 Times Daily Before Meals      lactulose 10 GM/15ML solution  Commonly known as:  CHRONULAC   30 g, Oral, 3 Times Daily      polyethylene glycol 17 g packet  Commonly known as:  MIRALAX   17 g, Oral, Daily   Start Date:  June 16, 2020     sennosides-docusate 8.6-50 MG per tablet  Commonly known as:  PERICOLACE   1 tablet, Oral, 2 Times Daily         Changes to Medications      Instructions Start Date   insulin detemir 100 UNIT/ML injection  Commonly known as:  LEVEMIR  What changed:    · how much to take  · when to take this   25 Units, Subcutaneous, Nightly         Continue These Medications      Instructions Start Date   buprenorphine-naloxone 8-2 MG per SL tablet  Commonly known as:  SUBOXONE   1.25 tablets, Sublingual, Daily      carvedilol  6.25 MG tablet  Commonly known as:  COREG   6.25 mg, Oral, 2 Times Daily With Meals      cloNIDine 0.2 MG tablet  Commonly known as:  CATAPRES   0.2 mg, Oral, 2 Times Daily      rOPINIRole 0.25 MG tablet  Commonly known as:  REQUIP   0.25 mg, Oral, Nightly, Take 1 hour before bedtime.      tiZANidine 4 MG tablet  Commonly known as:  ZANAFLEX   4 mg, Oral, Every 8 Hours PRN         Stop These Medications    insulin regular 100 UNIT/ML injection  Commonly known as:  humuLIN R,novoLIN R     lisinopril 20 MG tablet  Commonly known as:  PRINIVIL,ZESTRIL        ASK your doctor about these medications      Instructions Start Date   Patiromer Sorbitex Calcium 8.4 g pack  Commonly known as:  VELTASSA   Mix 1 packet in 4 to 6 ounces of liquid and drink daily               Discharge Diet:   Dietary Orders (From admission, onward)     Start     Ordered    06/06/20 1400  Diet Regular; Consistent Carbohydrate, Low Potassium  Diet Effective Now     Question Answer Comment   Diet Texture / Consistency Regular    Common Modifiers Consistent Carbohydrate    Common Modifiers Low Potassium        06/06/20 1400                Activity at Discharge:  activity as tolerated    Follow-up Appointments:  Additional Instructions for the Follow-ups that You Need to Schedule     Discharge Follow-up with PCP   As directed       Currently Documented PCP:    Provider, No Known    PCP Phone Number:    [phone]     Follow Up Details:  Follow up with GENARO Kelly in 1 week.         Discharge Follow-up with Specified Provider: Follow up with Dr. Kinney in 6 weeks.   As directed      To:  Follow up with Dr. Kinney in 6 weeks.         Basic Metabolic Panel    Jun 20, 2020 (Approximate)      Please send results to Dr. Kinney.    Order Comments:  Please send results to Dr. Kinney.            Follow-up Information     Page, Jermaine Mcdonald MD. Schedule an appointment as soon as possible for a visit in 2 week(s).    Specialty:  Orthopedic  Surgery  Contact information:  [address] [place] [address] [address] [place] [place] [address] [phone]             Provider, No Known .    Why:  Follow up with GENARO Kelly in 1 week.  Contact information:  [facility] [address] [phone]                       Test Results Pending at Discharge:  Hep C RNA, cryoglobulins      Jeremiah Medina DO  06/15/20  11:18 AM      Time: Greater than 30 minutes spent on this discharge.

## 2020-06-15 NOTE — THERAPY TREATMENT NOTE
Acute Care - Physical Therapy Treatment Note  [facility]     Patient Name: Angelique Mcdermott  : 1980  MRN: [id number]  Today's Date: 6/15/2020  Onset of Illness/Injury or Date of Surgery: 20  Date of Referral to PT: 20  Referring Physician: decreased functional mobility    Admit Date: 2020    Visit Dx:    ICD-10-CM ICD-9-CM   1. Type 2 diabetes mellitus with hyperglycemia, with long-term current use of insulin (CMS/[facility]) E11.65 250.00    Z79.4 790.29     V58.67   2. Hyponatremia E87.1 276.1   3. Chronic hepatitis C without hepatic coma (CMS/[facility]) B18.2 070.54   4. Methamphetamine abuse (CMS/[facility]) F15.10 305.70   5. Acute renal failure, unspecified acute renal failure type (CMS/[facility]) N17.9 584.9   6. Asymptomatic microscopic hematuria R31.21 599.72   7. Hypertension, unspecified type I10 401.9   8. Olecranon bursa abscess, left M71.022 727.89   9. Hyperkalemia E87.5 276.7     Patient Active Problem List   Diagnosis   • Type 2 diabetes mellitus with hyperglycemia, with long-term current use of insulin (CMS/[facility])   • Olecranon bursa abscess, left       Therapy Treatment    Rehabilitation Treatment Summary     Row Name 06/15/20 1500             Treatment Time/Intention    Discipline  physical therapist  -BC      Document Type  therapy note (daily note)  -BC      Mode of Treatment  physical therapy  -BC      Patient Effort  good  -BC      Recorded by [BC] Rika Gutierrez, PT 06/15/20 1541      Row Name 06/15/20 1500             Cognitive Assessment/Intervention- PT/OT    Orientation Status (Cognition)  oriented x 3  -BC      Follows Commands (Cognition)  WFL  -BC      Recorded by [BC] Rika Gutierrez, PT 06/15/20 1541      Row Name 06/15/20 1500             Mobility Assessment/Intervention    Extremity Weight-bearing Status  --  -BC      Recorded by [BC] Rika Gutierrez, PT 06/15/20 1541      Row Name 06/15/20 1500             Bed Mobility Assessment/Treatment    Bed Mobility Assessment/Treatment   bed mobility (all) activities  -BC      [place] Level (Bed Mobility)  minimum assist (75% patient effort)  -BC      Bed Mobility, Safety Issues  decreased use of arms for pushing/pulling;decreased use of legs for bridging/pushing  -BC      Assistive Device (Bed Mobility)  bed rails  -BC      Recorded by [BC] Rika Gutierrez, PT 06/15/20 1541      Row Name 06/15/20 1500             Transfer Assessment/Treatment    Comment (Transfers)  unable to stand this date  -BC      Recorded by [BC] Rika Gutierrez, PT 06/15/20 1541      Row Name 06/15/20 1500             Gait/Stairs Assessment/Training    Comment (Gait/Stairs)  pt unable to ambulate  -BC      Recorded by [BC] Rika Gutierrez, PT 06/15/20 1541      Row Name                Wound 05/25/20 0845 Right anterior toe Arterial Ulcer    Wound - Properties Group Date first assessed: 05/25/20 [TW] Time first assessed: 0845 [TW] Present on Hospital Admission: Y [TW] Side: Right [TW] Orientation: anterior [TW] Location: toe [TW] Primary Wound Type: Arterial ulc [TW] Recorded by:  [TW] Stacy Cisneros RN 05/25/20 1259    Row Name                Wound 05/25/20 0845 Right anterior;distal;lower ankle Venous Ulcer    Wound - Properties Group Date first assessed: 05/25/20 [TW] Time first assessed: 0845 [TW] Present on Hospital Admission: Y [TW] Side: Right [TW] Orientation: anterior;distal;lower [TW] Location: ankle [TW], multiple areas noted open and scabbed  Primary Wound Type: Venous ulcer [TW] Recorded by:  [TW] Stacy Cisneros RN 05/25/20 1301    Row Name                Wound 05/25/20 0845 Right anterior;midline;lower leg Venous Ulcer    Wound - Properties Group Date first assessed: 05/25/20 [TW] Time first assessed: 0845 [TW] Present on Hospital Admission: Y [TW] Side: Right [TW] Orientation: anterior;midline;lower [TW] Location: leg [TW], multiple areas noted on shin that are open and/or dry  Primary Wound Type: Venous ulcer [TW] Recorded by:  [TW] Blayne  Stacy Conde RN 05/25/20 1303    Row Name                Wound 05/25/20 0845 Right lower abdomen Other (comment)    Wound - Properties Group Date first assessed: 05/25/20 [TW] Time first assessed: 0845 [TW] Present on Hospital Admission: Y [TW] Side: Right [TW] Orientation: lower [TW] Location: abdomen [TW] Primary Wound Type: Other [TW], one open area, one dry area noted  Recorded by:  [TW] Stacy Cisneros RN 05/25/20 1308    Row Name                Wound 05/25/20 0845 Right mid axillary Other (comment)    Wound - Properties Group Date first assessed: 05/25/20 [TW] Time first assessed: 0845 [TW] Present on Hospital Admission: Y [TW] Side: Right [TW] Location: mid axillary [TW] Primary Wound Type: Other [TW], open area noted  Recorded by:  [TW] Stacy Cisneros RN 05/25/20 1310    Row Name                Wound 05/25/20 0845 Right shoulder Other (comment)    Wound - Properties Group Date first assessed: 05/25/20 [TW] Time first assessed: 0845 [TW] Present on Hospital Admission: Y [TW] Side: Right [TW] Location: shoulder [TW], outer shoulder with open area noted  Primary Wound Type: Other [TW], wound, open  Recorded by:  [TW] Stacy Cisneros RN 05/25/20 1312    Row Name                Wound 05/25/20 0845 scalp Other (comment)    Wound - Properties Group Date first assessed: 05/25/20 [TW] Time first assessed: 0845 [TW] Present on Hospital Admission: Y [TW] Location: scalp [TW] Primary Wound Type: Other [TW], wound x 2 top of head; one with partial yellow slough  Recorded by:  [TW] Stacy Cisneros RN 05/25/20 1314      User Key  (r) = Recorded By, (t) = Taken By, (c) = Cosigned By    Initials Name Effective Dates Discipline    TW Stacy Cisneros RN 06/16/16 -  Nurse    Rika Palomares PT 03/14/16 -  PT          Wound 05/25/20 0845 Right anterior toe Arterial Ulcer (Active)   Dressing Appearance open to air 6/15/2020  8:17 AM   Base dry;non-blanchable 6/15/2020  8:17 AM   Periwound dry;warm  6/15/2020  8:17 AM   Periwound Temperature warm 6/15/2020  8:17 AM   Periwound Skin Turgor firm 6/15/2020  8:17 AM   Edges irregular 6/15/2020  8:17 AM   Drainage Amount none 6/14/2020  8:30 PM       Wound 05/25/20 0845 Right anterior;distal;lower ankle Venous Ulcer (Active)   Dressing Appearance dry;intact 6/15/2020 12:00 PM   Base pink 6/15/2020 12:00 PM   Periwound dry 6/15/2020 12:00 PM   Periwound Temperature warm 6/15/2020 12:00 PM   Periwound Skin Turgor firm 6/15/2020 12:00 PM   Edges irregular 6/14/2020  8:30 PM   Drainage Amount none 6/14/2020  8:30 PM   Dressing Care, Wound dressing moistened 6/15/2020 12:00 PM   Periwound Care, Wound topical treatment applied 6/15/2020 12:00 PM       Wound 05/25/20 0845 Right anterior;midline;lower leg Venous Ulcer (Active)   Dressing Appearance dry;intact 6/15/2020 12:00 PM   Closure Adhesive bandage 6/15/2020 12:00 PM   Base non-blanchable;purple 6/15/2020 12:00 PM   Periwound dry 6/14/2020  8:30 PM   Periwound Temperature warm 6/14/2020  8:30 PM   Periwound Skin Turgor firm 6/14/2020  8:30 PM   Edges irregular 6/14/2020  8:30 PM   Drainage Amount scant 6/14/2020  8:30 PM   Dressing Care, Wound dressing changed 6/15/2020 12:00 PM   Periwound Care, Wound topical treatment applied 6/15/2020 12:00 PM       Wound 05/25/20 0845 Right lower abdomen Other (comment) (Active)   Dressing Appearance dry;intact 6/15/2020  8:17 AM   Closure None 6/14/2020  8:30 PM   Base pink 6/14/2020  8:30 PM   Periwound dry 6/14/2020  8:30 PM   Periwound Temperature warm 6/14/2020  8:30 PM   Periwound Skin Turgor firm 6/14/2020  8:30 PM   Edges irregular 6/14/2020  8:30 PM   Drainage Amount none 6/14/2020  8:30 PM   Care, Wound other (see comments) 6/14/2020  8:30 PM   Dressing Care, Wound dressing changed 6/14/2020  8:30 PM       Wound 05/25/20 0845 Right mid axillary Other (comment) (Active)   Dressing Appearance open to air 6/15/2020 12:00 PM   Base pink;dry;clean 6/15/2020 12:00 PM    Periwound pink;intact 6/14/2020  8:30 PM   Periwound Temperature warm 6/14/2020  8:30 PM   Periwound Skin Turgor soft 6/14/2020  8:30 PM   Edges irregular 6/14/2020  8:30 PM   Drainage Amount none 6/14/2020  8:30 PM       Wound 05/25/20 0845 Right shoulder Other (comment) (Active)   Dressing Appearance open to air 6/15/2020 12:00 PM   Base dry;scab 6/15/2020 12:00 PM   Periwound dry 6/14/2020  8:30 PM   Periwound Temperature warm 6/14/2020  8:30 PM   Periwound Skin Turgor firm 6/14/2020  8:30 PM   Edges irregular 6/14/2020  8:30 PM   Drainage Amount none 6/14/2020  8:30 PM       Wound 05/25/20 0845 scalp Other (comment) (Active)   Dressing Appearance open to air 6/15/2020 12:00 PM   Closure Open to air 6/15/2020  8:17 AM   Base dry;scab 6/14/2020  8:30 PM   Periwound dry 6/15/2020 12:00 PM   Periwound Temperature warm 6/15/2020 12:00 PM   Drainage Amount none 6/14/2020  8:30 PM           Physical Therapy Education                 Title: PT OT SLP Therapies (Resolved)     Topic: Physical Therapy (Resolved)     Point: Mobility training (Resolved)     Description:   Instruct learner(s) on safety and technique for assisting patient out of bed, chair or wheelchair.  Instruct in the proper use of assistive devices, such as walker, crutches, cane or brace.              Patient Friendly Description:   It's important to get you on your feet again, but we need to do so in a way that is safe for you. Falling has serious consequences, and your personal safety is the most important thing of all.        When it's time to get out of bed, one of us or a family member will sit next to you on the bed to give you support.     If your doctor or nurse tells you to use a walker, crutches, a cane, or a brace, be sure you use it every time you get out of bed, even if you think you don't need it.    Learning Progress Summary           Patient Acceptance, E,TB, NR by KM at 6/14/2020 0853    Acceptance, E,TB, VU by KM at 6/13/2020 1016     Acceptance, E,TB, VU by BT at 6/12/2020 1735    Acceptance, E,TB, VU by BT at 6/11/2020 0814    Acceptance, E,TB, VU by RF at 6/9/2020 1608    Acceptance, E,TB, VU by RF at 6/8/2020 1557    Acceptance, E,TB, VU by BT at 6/7/2020 0927    Acceptance, E, VU by CC at 6/5/2020 1513    Acceptance, E,TB, VU by CT at 6/4/2020 1346    Acceptance, E, VU by KB at 6/3/2020 1326    Acceptance, E,TB, VU by CT at 6/2/2020 1413                   Point: Home exercise program (Resolved)     Description:   Instruct learner(s) on appropriate technique for monitoring, assisting and/or progressing patient with therapeutic exercises and activities.              Learning Progress Summary           Patient Acceptance, E,TB, NR by KM at 6/14/2020 0853    Acceptance, E,TB, VU by KM at 6/13/2020 1015    Acceptance, E,TB, VU by BT at 6/12/2020 1735    Acceptance, E,TB, VU by BT at 6/11/2020 0814    Acceptance, E,TB, VU by RF at 6/9/2020 1608    Acceptance, E,TB, VU by RF at 6/8/2020 1557    Acceptance, E,TB, VU by BT at 6/7/2020 0927    Acceptance, E, VU by CC at 6/5/2020 1513    Acceptance, E,TB, VU by CT at 6/4/2020 1346    Acceptance, E, VU by KB at 6/3/2020 1326    Acceptance, E,TB, VU by CT at 6/2/2020 1413                   Point: Body mechanics (Resolved)     Description:   Instruct learner(s) on proper positioning and spine alignment for patient and/or caregiver during mobility tasks and/or exercises.              Learning Progress Summary           Patient Acceptance, E,TB, NR by KM at 6/14/2020 0853    Acceptance, E,TB, VU by KM at 6/13/2020 1015    Acceptance, E,TB, VU by BT at 6/12/2020 1735    Acceptance, E,TB, VU by BT at 6/11/2020 0814    Acceptance, E,TB, VU by RF at 6/9/2020 1608    Acceptance, E,TB, VU by RF at 6/8/2020 1557    Acceptance, E,TB, VU by BT at 6/7/2020 0927    Acceptance, E, VU by CC at 6/5/2020 1513    Acceptance, E,TB, VU by CT at 6/4/2020 1346    Acceptance, E, VU by KB at 6/3/2020 1326    Acceptance, E,TB, VU  by CT at 6/2/2020 1413                   Point: Precautions (Resolved)     Description:   Instruct learner(s) on prescribed precautions during mobility and gait tasks              Learning Progress Summary           Patient Acceptance, E,TB, NR by KM at 6/14/2020 0853    Acceptance, E,TB, VU by KM at 6/13/2020 1015    Acceptance, E,TB, VU by BT at 6/12/2020 1735    Acceptance, E,TB, VU by BT at 6/11/2020 0814    Acceptance, E,TB, VU by RF at 6/9/2020 1608    Acceptance, E,TB, VU by RF at 6/8/2020 1557    Acceptance, E,TB, VU by BT at 6/7/2020 0927    Acceptance, E, VU by CC at 6/5/2020 1513    Acceptance, E,TB, VU by CT at 6/4/2020 1346    Acceptance, E, VU by KB at 6/3/2020 1326    Acceptance, E,TB, VU by CT at 6/2/2020 1413                               User Key     Initials Effective Dates Name Provider Type Discipline     06/16/16 -  Billy Maier, RN Registered Nurse Nurse    CT 04/03/18 -  Nury Ochoa, PT Physical Therapist PT    BT 05/22/17 -  Ronan Baldwin, RN Registered Nurse Nurse    RF 03/07/18 -  Jossy Soriano PTA Physical Therapy Assistant PT    KB 07/25/18 -  Ml Hung, MCKENNA Registered Nurse Nurse    CC 01/15/20 -  Cheyenne Sanchez, RN Registered Nurse Nurse                PT Recommendation and Plan           Time Calculation:   PT Charges     Row Name 06/15/20 1541             Time Calculation    PT Received On  06/15/20  -BC         Time Calculation- PT    Total Timed Code Minutes- PT  30 minute(s)  -BC         Timed Charges    28800 - PT Therapeutic Exercise Minutes  15  -BC      64954 - PT Therapeutic Activity Minutes  15  -BC        User Key  (r) = Recorded By, (t) = Taken By, (c) = Cosigned By    Initials Name Provider Type    BC Rika Gutierrez PT Physical Therapist        Therapy Charges for Today     Code Description Service Date Service Provider Modifiers Qty    14472415083 HC PT THER PROC EA 15 MIN 6/15/2020 Rika Gutierrez PT GP 1    51275356332 HC PT THERAPEUTIC  ACT EA 15 MIN 6/15/2020 Rika Gutierrez, PT GP 1               Rika Gutierrez, PT  6/15/2020

## 2020-06-16 ENCOUNTER — READMISSION MANAGEMENT (OUTPATIENT)
Dept: CALL CENTER | Facility: HOSPITAL | Age: 40
End: 2020-06-16

## 2020-06-16 NOTE — OUTREACH NOTE
Prep Survey      Responses   [facility] facility patient discharged from?  [facility]   Is LACE score < 7 ?  No   Eligibility  Not Eligible   What are the reasons patient is not eligible?  Other [Substance abuse, IVDA methamphetamine and Buprenorphine, ]   COVID-19 Test Status  Not tested   Does the patient have one of the following disease processes/diagnoses(primary or secondary)?  Other   Prep survey completed?  Yes          Chrystal Hendrickson RN

## 2020-06-16 NOTE — PROGRESS NOTES
I was notified this morning by Dr. Medina that patient needed an appointment in the Hep C clinic. At discharge, the patients viral load was pending. I was able to speak with Vania the Hep C clinic here within the hospital and she will set the patient up an appointment and will notify the patient as well. No further needs identified.

## 2020-06-17 LAB
HCV RNA SERPL NAA+PROBE-ACNC: 1720 IU/ML
HCV RNA SERPL NAA+PROBE-LOG IU: 3.24 LOG10 IU/ML
TEST INFORMATION: NORMAL

## 2020-06-18 LAB — CRYOGLOB SER QL 1D COLD INC: NORMAL

## 2021-03-16 LAB
ALDOST SERPL-MCNC: 1.6 NG/DL (ref 0–30)
ALDOST/RENIN PLAS-RTO: >9.6 {RATIO} (ref 0–30)
RENIN PLAS-CCNC: <0.167 NG/ML/HR (ref 0.17–5.38)